# Patient Record
Sex: FEMALE | Race: WHITE | Employment: PART TIME | URBAN - METROPOLITAN AREA
[De-identification: names, ages, dates, MRNs, and addresses within clinical notes are randomized per-mention and may not be internally consistent; named-entity substitution may affect disease eponyms.]

---

## 2017-01-20 ENCOUNTER — HOSPITAL ENCOUNTER (OUTPATIENT)
Dept: WOMENS IMAGING | Age: 57
Discharge: HOME | End: 2017-01-20
Payer: COMMERCIAL

## 2017-01-20 DIAGNOSIS — Z12.31 ENCOUNTER FOR SCREENING MAMMOGRAM FOR BREAST CANCER: ICD-10-CM

## 2017-01-20 PROCEDURE — G0202 SCR MAMMO BI INCL CAD: HCPCS

## 2017-03-27 ENCOUNTER — APPOINTMENT (OUTPATIENT)
Dept: GENERAL RADIOLOGY | Age: 57
End: 2017-03-27
Payer: COMMERCIAL

## 2017-03-27 ENCOUNTER — HOSPITAL ENCOUNTER (EMERGENCY)
Age: 57
Discharge: HOME | End: 2017-03-27
Attending: EMERGENCY MEDICINE
Payer: COMMERCIAL

## 2017-03-27 VITALS
HEART RATE: 99 BPM | SYSTOLIC BLOOD PRESSURE: 127 MMHG | RESPIRATION RATE: 20 BRPM | TEMPERATURE: 98.9 F | OXYGEN SATURATION: 96 % | BODY MASS INDEX: 26.68 KG/M2 | DIASTOLIC BLOOD PRESSURE: 75 MMHG | WEIGHT: 170 LBS | HEIGHT: 67 IN

## 2017-03-27 DIAGNOSIS — J11.1 INFLUENZA WITH RESPIRATORY MANIFESTATION OTHER THAN PNEUMONIA: Primary | ICD-10-CM

## 2017-03-27 LAB
ALBUMIN SERPL-MCNC: 4.1 G/DL (ref 3.9–4.9)
ALP BLD-CCNC: 56 U/L (ref 40–130)
ALT SERPL-CCNC: 17 U/L (ref 0–33)
ANION GAP SERPL CALCULATED.3IONS-SCNC: 16 MEQ/L (ref 7–13)
AST SERPL-CCNC: 18 U/L (ref 0–35)
BASOPHILS ABSOLUTE: 0 K/UL (ref 0–0.2)
BASOPHILS RELATIVE PERCENT: 0.7 %
BILIRUB SERPL-MCNC: 0.2 MG/DL (ref 0–1.2)
BUN BLDV-MCNC: 8 MG/DL (ref 6–20)
CALCIUM SERPL-MCNC: 8.7 MG/DL (ref 8.6–10.2)
CHLORIDE BLD-SCNC: 100 MEQ/L (ref 98–107)
CO2: 21 MEQ/L (ref 22–29)
CREAT SERPL-MCNC: 0.88 MG/DL (ref 0.5–0.9)
EOSINOPHILS ABSOLUTE: 0.1 K/UL (ref 0–0.7)
EOSINOPHILS RELATIVE PERCENT: 2.9 %
GFR AFRICAN AMERICAN: >60
GFR NON-AFRICAN AMERICAN: >60
GLOBULIN: 2.3 G/DL (ref 2.3–3.5)
GLUCOSE BLD-MCNC: 103 MG/DL (ref 74–109)
HCT VFR BLD CALC: 38.5 % (ref 37–47)
HEMOGLOBIN: 13.2 G/DL (ref 12–16)
LYMPHOCYTES ABSOLUTE: 1.7 K/UL (ref 1–4.8)
LYMPHOCYTES RELATIVE PERCENT: 42.2 %
MCH RBC QN AUTO: 30.9 PG (ref 27–31.3)
MCHC RBC AUTO-ENTMCNC: 34.3 % (ref 33–37)
MCV RBC AUTO: 90 FL (ref 82–100)
MONOCYTES ABSOLUTE: 0.3 K/UL (ref 0.2–0.8)
MONOCYTES RELATIVE PERCENT: 6.9 %
NEUTROPHILS ABSOLUTE: 1.9 K/UL (ref 1.4–6.5)
NEUTROPHILS RELATIVE PERCENT: 47.3 %
PDW BLD-RTO: 12.9 % (ref 11.5–14.5)
PLATELET # BLD: 172 K/UL (ref 130–400)
POTASSIUM SERPL-SCNC: 4.1 MEQ/L (ref 3.5–5.1)
RAPID INFLUENZA  B AGN: POSITIVE
RAPID INFLUENZA A AGN: NEGATIVE
RBC # BLD: 4.28 M/UL (ref 4.2–5.4)
SODIUM BLD-SCNC: 137 MEQ/L (ref 132–144)
TOTAL PROTEIN: 6.4 G/DL (ref 6.4–8.1)
WBC # BLD: 4 K/UL (ref 4.8–10.8)

## 2017-03-27 PROCEDURE — 6370000000 HC RX 637 (ALT 250 FOR IP): Performed by: EMERGENCY MEDICINE

## 2017-03-27 PROCEDURE — 96374 THER/PROPH/DIAG INJ IV PUSH: CPT

## 2017-03-27 PROCEDURE — 6360000002 HC RX W HCPCS: Performed by: EMERGENCY MEDICINE

## 2017-03-27 PROCEDURE — 2580000003 HC RX 258: Performed by: EMERGENCY MEDICINE

## 2017-03-27 PROCEDURE — 94640 AIRWAY INHALATION TREATMENT: CPT

## 2017-03-27 PROCEDURE — 85025 COMPLETE CBC W/AUTO DIFF WBC: CPT

## 2017-03-27 PROCEDURE — 99284 EMERGENCY DEPT VISIT MOD MDM: CPT

## 2017-03-27 PROCEDURE — 80053 COMPREHEN METABOLIC PANEL: CPT

## 2017-03-27 PROCEDURE — 71020 XR CHEST STANDARD TWO VW: CPT

## 2017-03-27 PROCEDURE — 86403 PARTICLE AGGLUT ANTBDY SCRN: CPT

## 2017-03-27 PROCEDURE — 96375 TX/PRO/DX INJ NEW DRUG ADDON: CPT

## 2017-03-27 RX ORDER — ALBUTEROL SULFATE 90 UG/1
2 AEROSOL, METERED RESPIRATORY (INHALATION) EVERY 4 HOURS PRN
Qty: 1 INHALER | Refills: 0 | Status: SHIPPED | OUTPATIENT
Start: 2017-03-27 | End: 2018-04-05 | Stop reason: ALTCHOICE

## 2017-03-27 RX ORDER — DULOXETIN HYDROCHLORIDE 30 MG/1
30 CAPSULE, DELAYED RELEASE ORAL
Status: ON HOLD | COMMUNITY
Start: 2017-01-23 | End: 2021-07-29 | Stop reason: HOSPADM

## 2017-03-27 RX ORDER — GABAPENTIN 300 MG/1
300 CAPSULE ORAL 3 TIMES DAILY
Status: ON HOLD | COMMUNITY
Start: 2017-02-22 | End: 2021-09-28 | Stop reason: HOSPADM

## 2017-03-27 RX ORDER — LORAZEPAM 2 MG/1
2 TABLET ORAL
COMMUNITY
Start: 2017-03-23 | End: 2018-04-05 | Stop reason: SDUPTHER

## 2017-03-27 RX ORDER — KETOROLAC TROMETHAMINE 30 MG/ML
30 INJECTION, SOLUTION INTRAMUSCULAR; INTRAVENOUS ONCE
Status: COMPLETED | OUTPATIENT
Start: 2017-03-27 | End: 2017-03-27

## 2017-03-27 RX ORDER — SUMATRIPTAN 100 MG/1
100 TABLET, FILM COATED ORAL
COMMUNITY
Start: 2016-06-06 | End: 2018-09-19 | Stop reason: SDUPTHER

## 2017-03-27 RX ORDER — IPRATROPIUM BROMIDE AND ALBUTEROL SULFATE 2.5; .5 MG/3ML; MG/3ML
1 SOLUTION RESPIRATORY (INHALATION) ONCE
Status: COMPLETED | OUTPATIENT
Start: 2017-03-27 | End: 2017-03-27

## 2017-03-27 RX ORDER — 0.9 % SODIUM CHLORIDE 0.9 %
1000 INTRAVENOUS SOLUTION INTRAVENOUS ONCE
Status: COMPLETED | OUTPATIENT
Start: 2017-03-27 | End: 2017-03-27

## 2017-03-27 RX ORDER — ONDANSETRON 2 MG/ML
4 INJECTION INTRAMUSCULAR; INTRAVENOUS ONCE
Status: COMPLETED | OUTPATIENT
Start: 2017-03-27 | End: 2017-03-27

## 2017-03-27 RX ADMIN — SODIUM CHLORIDE 1000 ML: 9 INJECTION, SOLUTION INTRAVENOUS at 19:35

## 2017-03-27 RX ADMIN — ONDANSETRON HYDROCHLORIDE 4 MG: 2 SOLUTION INTRAMUSCULAR; INTRAVENOUS at 19:35

## 2017-03-27 RX ADMIN — KETOROLAC TROMETHAMINE 30 MG: 30 INJECTION, SOLUTION INTRAMUSCULAR at 19:36

## 2017-03-27 RX ADMIN — IPRATROPIUM BROMIDE AND ALBUTEROL SULFATE 1 AMPULE: .5; 3 SOLUTION RESPIRATORY (INHALATION) at 18:53

## 2017-03-27 ASSESSMENT — PAIN SCALES - GENERAL: PAINLEVEL_OUTOF10: 4

## 2017-03-27 ASSESSMENT — PAIN DESCRIPTION - DESCRIPTORS: DESCRIPTORS: ACHING

## 2017-03-27 ASSESSMENT — ENCOUNTER SYMPTOMS
FACIAL SWELLING: 0
SINUS PRESSURE: 1
EYE PAIN: 0
BACK PAIN: 0
BLOOD IN STOOL: 0
TROUBLE SWALLOWING: 0
VOICE CHANGE: 0
DIARRHEA: 0
CONSTIPATION: 0
ABDOMINAL PAIN: 0
SHORTNESS OF BREATH: 0
COUGH: 1
EYE DISCHARGE: 0
STRIDOR: 0
WHEEZING: 1
CHEST TIGHTNESS: 0
VOMITING: 0
RHINORRHEA: 1
SORE THROAT: 0
CHOKING: 0
EYE REDNESS: 0

## 2017-03-27 ASSESSMENT — PAIN DESCRIPTION - LOCATION: LOCATION: BACK;GENERALIZED

## 2017-03-27 ASSESSMENT — PAIN DESCRIPTION - ONSET: ONSET: PROGRESSIVE

## 2017-03-27 ASSESSMENT — PAIN DESCRIPTION - ORIENTATION: ORIENTATION: RIGHT;LEFT;LOWER;UPPER

## 2017-03-27 ASSESSMENT — PAIN DESCRIPTION - PROGRESSION: CLINICAL_PROGRESSION: NOT CHANGED

## 2017-04-03 ENCOUNTER — OFFICE VISIT (OUTPATIENT)
Dept: FAMILY MEDICINE CLINIC | Age: 57
End: 2017-04-03

## 2017-04-03 VITALS
BODY MASS INDEX: 26.53 KG/M2 | SYSTOLIC BLOOD PRESSURE: 122 MMHG | RESPIRATION RATE: 20 BRPM | HEIGHT: 67 IN | TEMPERATURE: 97.6 F | WEIGHT: 169 LBS | OXYGEN SATURATION: 98 % | DIASTOLIC BLOOD PRESSURE: 82 MMHG | HEART RATE: 114 BPM

## 2017-04-03 DIAGNOSIS — J06.9 UPPER RESPIRATORY TRACT INFECTION, UNSPECIFIED TYPE: Primary | ICD-10-CM

## 2017-04-03 DIAGNOSIS — M79.10 MYALGIA: ICD-10-CM

## 2017-04-03 PROCEDURE — 99213 OFFICE O/P EST LOW 20 MIN: CPT | Performed by: NURSE PRACTITIONER

## 2017-04-03 PROCEDURE — 87804 INFLUENZA ASSAY W/OPTIC: CPT | Performed by: NURSE PRACTITIONER

## 2017-04-03 RX ORDER — AZITHROMYCIN 250 MG/1
TABLET, FILM COATED ORAL
Qty: 1 PACKET | Refills: 0 | Status: SHIPPED | OUTPATIENT
Start: 2017-04-03 | End: 2018-04-05 | Stop reason: ALTCHOICE

## 2017-04-07 ENCOUNTER — TELEPHONE (OUTPATIENT)
Dept: SURGERY | Age: 57
End: 2017-04-07

## 2017-04-17 DIAGNOSIS — R93.89 ABNORMAL CT OF THE CHEST: Primary | ICD-10-CM

## 2017-04-20 ASSESSMENT — ENCOUNTER SYMPTOMS
VOMITING: 0
COUGH: 1
DIARRHEA: 0
RHINORRHEA: 1
ANAL BLEEDING: 0
SHORTNESS OF BREATH: 0
SINUS PAIN: 1
CONSTIPATION: 0
BLOOD IN STOOL: 0
NAUSEA: 0
CHEST TIGHTNESS: 0
ABDOMINAL PAIN: 0
SORE THROAT: 1
WHEEZING: 0
ABDOMINAL DISTENTION: 0
SWOLLEN GLANDS: 1

## 2017-04-28 ENCOUNTER — HOSPITAL ENCOUNTER (OUTPATIENT)
Dept: ULTRASOUND IMAGING | Age: 57
Discharge: HOME | End: 2017-04-28
Payer: COMMERCIAL

## 2017-04-28 ENCOUNTER — HOSPITAL ENCOUNTER (OUTPATIENT)
Dept: WOMENS IMAGING | Age: 57
Discharge: HOME | End: 2017-04-28
Payer: COMMERCIAL

## 2017-04-28 DIAGNOSIS — R93.89 ABNORMAL CT OF THE CHEST: ICD-10-CM

## 2017-04-28 PROCEDURE — 76642 ULTRASOUND BREAST LIMITED: CPT

## 2017-04-28 PROCEDURE — G0206 DX MAMMO INCL CAD UNI: HCPCS

## 2017-05-02 ENCOUNTER — OFFICE VISIT (OUTPATIENT)
Dept: SURGERY | Age: 57
End: 2017-05-02

## 2017-05-02 VITALS
SYSTOLIC BLOOD PRESSURE: 122 MMHG | HEIGHT: 67 IN | DIASTOLIC BLOOD PRESSURE: 84 MMHG | BODY MASS INDEX: 26.15 KG/M2 | WEIGHT: 166.6 LBS | TEMPERATURE: 97.6 F

## 2017-05-02 DIAGNOSIS — R92.8 ABNORMAL ULTRASOUND OF BREAST: ICD-10-CM

## 2017-05-02 DIAGNOSIS — R92.8 ABNORMAL MAMMOGRAM: Primary | ICD-10-CM

## 2017-05-02 PROCEDURE — 99213 OFFICE O/P EST LOW 20 MIN: CPT | Performed by: SURGERY

## 2017-05-02 PROCEDURE — G8419 CALC BMI OUT NRM PARAM NOF/U: HCPCS | Performed by: SURGERY

## 2017-05-02 PROCEDURE — G8427 DOCREV CUR MEDS BY ELIG CLIN: HCPCS | Performed by: SURGERY

## 2017-05-02 PROCEDURE — 3017F COLORECTAL CA SCREEN DOC REV: CPT | Performed by: SURGERY

## 2017-05-02 PROCEDURE — 4004F PT TOBACCO SCREEN RCVD TLK: CPT | Performed by: SURGERY

## 2017-05-02 PROCEDURE — 3014F SCREEN MAMMO DOC REV: CPT | Performed by: SURGERY

## 2017-05-03 PROBLEM — R92.8 ABNORMAL ULTRASOUND OF BREAST: Status: ACTIVE | Noted: 2017-05-03

## 2017-05-10 ENCOUNTER — HOSPITAL ENCOUNTER (OUTPATIENT)
Dept: WOMENS IMAGING | Age: 57
Discharge: HOME | End: 2017-05-10
Payer: COMMERCIAL

## 2017-05-10 ENCOUNTER — HOSPITAL ENCOUNTER (OUTPATIENT)
Dept: ULTRASOUND IMAGING | Age: 57
Discharge: HOME | End: 2017-05-10
Payer: COMMERCIAL

## 2017-05-10 ENCOUNTER — PREP FOR PROCEDURE (OUTPATIENT)
Dept: WOMENS IMAGING | Age: 57
End: 2017-05-10

## 2017-05-10 VITALS
OXYGEN SATURATION: 97 % | DIASTOLIC BLOOD PRESSURE: 81 MMHG | HEART RATE: 81 BPM | WEIGHT: 165 LBS | HEIGHT: 67 IN | RESPIRATION RATE: 18 BRPM | SYSTOLIC BLOOD PRESSURE: 115 MMHG | BODY MASS INDEX: 25.9 KG/M2 | TEMPERATURE: 96.9 F

## 2017-05-10 DIAGNOSIS — R92.8 ABNORMAL ULTRASOUND OF BREAST: ICD-10-CM

## 2017-05-10 DIAGNOSIS — R92.8 ABNORMAL MAMMOGRAM: ICD-10-CM

## 2017-05-10 PROCEDURE — 19083 BX BREAST 1ST LESION US IMAG: CPT | Performed by: SURGERY

## 2017-05-10 PROCEDURE — 2580000003 HC RX 258: Performed by: SURGERY

## 2017-05-10 PROCEDURE — 19083 BX BREAST 1ST LESION US IMAG: CPT

## 2017-05-10 PROCEDURE — 88305 TISSUE EXAM BY PATHOLOGIST: CPT

## 2017-05-10 PROCEDURE — 2500000003 HC RX 250 WO HCPCS: Performed by: SURGERY

## 2017-05-10 PROCEDURE — G0206 DX MAMMO INCL CAD UNI: HCPCS

## 2017-05-10 RX ORDER — LIDOCAINE HYDROCHLORIDE 20 MG/ML
20 INJECTION, SOLUTION INFILTRATION; PERINEURAL ONCE
Status: CANCELLED | OUTPATIENT
Start: 2017-05-10 | End: 2017-05-10

## 2017-05-10 RX ORDER — SODIUM CHLORIDE 9 MG/ML
INJECTION, SOLUTION INTRAVENOUS CONTINUOUS
Status: CANCELLED | OUTPATIENT
Start: 2017-05-10

## 2017-05-10 RX ORDER — SODIUM CHLORIDE 9 MG/ML
INJECTION, SOLUTION INTRAVENOUS CONTINUOUS
Status: DISCONTINUED | OUTPATIENT
Start: 2017-05-10 | End: 2017-05-13 | Stop reason: HOSPADM

## 2017-05-10 RX ORDER — LIDOCAINE HYDROCHLORIDE 20 MG/ML
20 INJECTION, SOLUTION INFILTRATION; PERINEURAL ONCE
Status: COMPLETED | OUTPATIENT
Start: 2017-05-10 | End: 2017-05-10

## 2017-05-10 RX ADMIN — SODIUM CHLORIDE: 900 INJECTION, SOLUTION INTRAVENOUS at 12:00

## 2017-05-10 RX ADMIN — LIDOCAINE HYDROCHLORIDE 8 ML: 20 INJECTION, SOLUTION INFILTRATION; PERINEURAL at 12:18

## 2017-05-17 ENCOUNTER — OFFICE VISIT (OUTPATIENT)
Dept: SURGERY | Age: 57
End: 2017-05-17

## 2017-05-17 VITALS
DIASTOLIC BLOOD PRESSURE: 72 MMHG | BODY MASS INDEX: 26.21 KG/M2 | TEMPERATURE: 97.5 F | WEIGHT: 167 LBS | SYSTOLIC BLOOD PRESSURE: 114 MMHG | HEIGHT: 67 IN

## 2017-05-17 DIAGNOSIS — D05.12 DCIS (DUCTAL CARCINOMA IN SITU), LEFT: Primary | ICD-10-CM

## 2017-05-17 PROCEDURE — 3017F COLORECTAL CA SCREEN DOC REV: CPT | Performed by: SURGERY

## 2017-05-17 PROCEDURE — 4004F PT TOBACCO SCREEN RCVD TLK: CPT | Performed by: SURGERY

## 2017-05-17 PROCEDURE — 3014F SCREEN MAMMO DOC REV: CPT | Performed by: SURGERY

## 2017-05-17 PROCEDURE — G8427 DOCREV CUR MEDS BY ELIG CLIN: HCPCS | Performed by: SURGERY

## 2017-05-17 PROCEDURE — G8419 CALC BMI OUT NRM PARAM NOF/U: HCPCS | Performed by: SURGERY

## 2017-05-17 PROCEDURE — 99213 OFFICE O/P EST LOW 20 MIN: CPT | Performed by: SURGERY

## 2017-05-22 RX ORDER — SODIUM CHLORIDE, SODIUM LACTATE, POTASSIUM CHLORIDE, CALCIUM CHLORIDE 600; 310; 30; 20 MG/100ML; MG/100ML; MG/100ML; MG/100ML
INJECTION, SOLUTION INTRAVENOUS CONTINUOUS
Status: CANCELLED | OUTPATIENT
Start: 2017-05-22

## 2017-05-23 ENCOUNTER — ANESTHESIA (OUTPATIENT)
Dept: OPERATING ROOM | Age: 57
End: 2017-05-23
Payer: COMMERCIAL

## 2017-05-23 ENCOUNTER — APPOINTMENT (OUTPATIENT)
Dept: WOMENS IMAGING | Age: 57
End: 2017-05-23
Attending: SURGERY
Payer: COMMERCIAL

## 2017-05-23 ENCOUNTER — ANESTHESIA EVENT (OUTPATIENT)
Dept: OPERATING ROOM | Age: 57
End: 2017-05-23
Payer: COMMERCIAL

## 2017-05-23 ENCOUNTER — HOSPITAL ENCOUNTER (OUTPATIENT)
Age: 57
Setting detail: OUTPATIENT SURGERY
Discharge: HOME | End: 2017-05-23
Attending: SURGERY | Admitting: SURGERY
Payer: COMMERCIAL

## 2017-05-23 ENCOUNTER — APPOINTMENT (OUTPATIENT)
Dept: ULTRASOUND IMAGING | Age: 57
End: 2017-05-23
Attending: SURGERY
Payer: COMMERCIAL

## 2017-05-23 VITALS
HEART RATE: 80 BPM | TEMPERATURE: 97.3 F | HEIGHT: 67 IN | WEIGHT: 165 LBS | RESPIRATION RATE: 16 BRPM | SYSTOLIC BLOOD PRESSURE: 119 MMHG | OXYGEN SATURATION: 97 % | DIASTOLIC BLOOD PRESSURE: 72 MMHG | BODY MASS INDEX: 25.9 KG/M2

## 2017-05-23 VITALS
TEMPERATURE: 95.4 F | SYSTOLIC BLOOD PRESSURE: 127 MMHG | RESPIRATION RATE: 15 BRPM | DIASTOLIC BLOOD PRESSURE: 78 MMHG | OXYGEN SATURATION: 98 %

## 2017-05-23 DIAGNOSIS — R92.8 ABNORMAL MAMMOGRAM: ICD-10-CM

## 2017-05-23 LAB
ANION GAP SERPL CALCULATED.3IONS-SCNC: 12 MEQ/L (ref 7–13)
BUN BLDV-MCNC: 13 MG/DL (ref 6–20)
CALCIUM SERPL-MCNC: 8.9 MG/DL (ref 8.6–10.2)
CHLORIDE BLD-SCNC: 106 MEQ/L (ref 98–107)
CO2: 22 MEQ/L (ref 22–29)
CREAT SERPL-MCNC: 0.73 MG/DL (ref 0.5–0.9)
GFR AFRICAN AMERICAN: >60
GFR NON-AFRICAN AMERICAN: >60
GLUCOSE BLD-MCNC: 102 MG/DL (ref 74–109)
HCT VFR BLD CALC: 41.4 % (ref 37–47)
HEMOGLOBIN: 13.6 G/DL (ref 12–16)
MCH RBC QN AUTO: 29.6 PG (ref 27–31.3)
MCHC RBC AUTO-ENTMCNC: 32.9 % (ref 33–37)
MCV RBC AUTO: 89.8 FL (ref 82–100)
PDW BLD-RTO: 13.3 % (ref 11.5–14.5)
PLATELET # BLD: 200 K/UL (ref 130–400)
POTASSIUM SERPL-SCNC: 4.4 MEQ/L (ref 3.5–5.1)
RBC # BLD: 4.61 M/UL (ref 4.2–5.4)
SODIUM BLD-SCNC: 140 MEQ/L (ref 132–144)
WBC # BLD: 6.8 K/UL (ref 4.8–10.8)

## 2017-05-23 PROCEDURE — 76942 ECHO GUIDE FOR BIOPSY: CPT

## 2017-05-23 PROCEDURE — 2580000003 HC RX 258: Performed by: SURGERY

## 2017-05-23 PROCEDURE — 38900 IO MAP OF SENT LYMPH NODE: CPT | Performed by: SURGERY

## 2017-05-23 PROCEDURE — 3700000000 HC ANESTHESIA ATTENDED CARE: Performed by: SURGERY

## 2017-05-23 PROCEDURE — 88331 PATH CONSLTJ SURG 1 BLK 1SPC: CPT

## 2017-05-23 PROCEDURE — C1713 ANCHOR/SCREW BN/BN,TIS/BN: HCPCS | Performed by: SURGERY

## 2017-05-23 PROCEDURE — 19301 PARTIAL MASTECTOMY: CPT | Performed by: SURGERY

## 2017-05-23 PROCEDURE — 88307 TISSUE EXAM BY PATHOLOGIST: CPT

## 2017-05-23 PROCEDURE — 80048 BASIC METABOLIC PNL TOTAL CA: CPT

## 2017-05-23 PROCEDURE — 88305 TISSUE EXAM BY PATHOLOGIST: CPT

## 2017-05-23 PROCEDURE — 6370000000 HC RX 637 (ALT 250 FOR IP): Performed by: STUDENT IN AN ORGANIZED HEALTH CARE EDUCATION/TRAINING PROGRAM

## 2017-05-23 PROCEDURE — 85027 COMPLETE CBC AUTOMATED: CPT

## 2017-05-23 PROCEDURE — 3600000013 HC SURGERY LEVEL 3 ADDTL 15MIN: Performed by: SURGERY

## 2017-05-23 PROCEDURE — 7100000000 HC PACU RECOVERY - FIRST 15 MIN: Performed by: SURGERY

## 2017-05-23 PROCEDURE — 6360000002 HC RX W HCPCS: Performed by: NURSE ANESTHETIST, CERTIFIED REGISTERED

## 2017-05-23 PROCEDURE — 88341 IMHCHEM/IMCYTCHM EA ADD ANTB: CPT

## 2017-05-23 PROCEDURE — 76098 X-RAY EXAM SURGICAL SPECIMEN: CPT

## 2017-05-23 PROCEDURE — 3700000001 HC ADD 15 MINUTES (ANESTHESIA): Performed by: SURGERY

## 2017-05-23 PROCEDURE — 7100000010 HC PHASE II RECOVERY - FIRST 15 MIN: Performed by: SURGERY

## 2017-05-23 PROCEDURE — 6360000002 HC RX W HCPCS: Performed by: SURGERY

## 2017-05-23 PROCEDURE — 7100000001 HC PACU RECOVERY - ADDTL 15 MIN: Performed by: SURGERY

## 2017-05-23 PROCEDURE — 3600000003 HC SURGERY LEVEL 3 BASE: Performed by: SURGERY

## 2017-05-23 PROCEDURE — 38500 BIOPSY/REMOVAL LYMPH NODES: CPT | Performed by: SURGERY

## 2017-05-23 PROCEDURE — 88313 SPECIAL STAINS GROUP 2: CPT

## 2017-05-23 PROCEDURE — 7100000011 HC PHASE II RECOVERY - ADDTL 15 MIN: Performed by: SURGERY

## 2017-05-23 PROCEDURE — 6360000002 HC RX W HCPCS: Performed by: STUDENT IN AN ORGANIZED HEALTH CARE EDUCATION/TRAINING PROGRAM

## 2017-05-23 PROCEDURE — 2500000003 HC RX 250 WO HCPCS: Performed by: STUDENT IN AN ORGANIZED HEALTH CARE EDUCATION/TRAINING PROGRAM

## 2017-05-23 PROCEDURE — 88342 IMHCHEM/IMCYTCHM 1ST ANTB: CPT

## 2017-05-23 RX ORDER — SODIUM CHLORIDE 9 MG/ML
INJECTION, SOLUTION INTRAVENOUS CONTINUOUS
Status: DISCONTINUED | OUTPATIENT
Start: 2017-05-23 | End: 2017-05-23 | Stop reason: HOSPADM

## 2017-05-23 RX ORDER — MEPERIDINE HYDROCHLORIDE 25 MG/ML
12.5 INJECTION INTRAMUSCULAR; INTRAVENOUS; SUBCUTANEOUS EVERY 5 MIN PRN
Status: DISCONTINUED | OUTPATIENT
Start: 2017-05-23 | End: 2017-05-23 | Stop reason: HOSPADM

## 2017-05-23 RX ORDER — MAGNESIUM HYDROXIDE 1200 MG/15ML
LIQUID ORAL CONTINUOUS PRN
Status: DISCONTINUED | OUTPATIENT
Start: 2017-05-23 | End: 2017-05-23 | Stop reason: HOSPADM

## 2017-05-23 RX ORDER — HYDROCODONE BITARTRATE AND ACETAMINOPHEN 5; 325 MG/1; MG/1
2 TABLET ORAL PRN
Status: COMPLETED | OUTPATIENT
Start: 2017-05-23 | End: 2017-05-23

## 2017-05-23 RX ORDER — HYDROCODONE BITARTRATE AND ACETAMINOPHEN 5; 325 MG/1; MG/1
1 TABLET ORAL PRN
Status: COMPLETED | OUTPATIENT
Start: 2017-05-23 | End: 2017-05-23

## 2017-05-23 RX ORDER — METOCLOPRAMIDE HYDROCHLORIDE 5 MG/ML
10 INJECTION INTRAMUSCULAR; INTRAVENOUS
Status: DISCONTINUED | OUTPATIENT
Start: 2017-05-23 | End: 2017-05-23 | Stop reason: HOSPADM

## 2017-05-23 RX ORDER — SODIUM CHLORIDE 0.9 % (FLUSH) 0.9 %
10 SYRINGE (ML) INJECTION EVERY 12 HOURS SCHEDULED
Status: DISCONTINUED | OUTPATIENT
Start: 2017-05-23 | End: 2017-05-23 | Stop reason: HOSPADM

## 2017-05-23 RX ORDER — ACETAMINOPHEN 325 MG/1
650 TABLET ORAL EVERY 4 HOURS PRN
Status: DISCONTINUED | OUTPATIENT
Start: 2017-05-23 | End: 2017-05-23 | Stop reason: HOSPADM

## 2017-05-23 RX ORDER — OXYCODONE HYDROCHLORIDE AND ACETAMINOPHEN 5; 325 MG/1; MG/1
1 TABLET ORAL EVERY 6 HOURS PRN
Qty: 30 TABLET | Refills: 0 | Status: SHIPPED | OUTPATIENT
Start: 2017-05-23 | End: 2018-04-05 | Stop reason: ALTCHOICE

## 2017-05-23 RX ORDER — ACETAMINOPHEN 650 MG/1
650 SUPPOSITORY RECTAL EVERY 4 HOURS PRN
Status: DISCONTINUED | OUTPATIENT
Start: 2017-05-23 | End: 2017-05-23 | Stop reason: HOSPADM

## 2017-05-23 RX ORDER — PROPOFOL 10 MG/ML
INJECTION, EMULSION INTRAVENOUS PRN
Status: DISCONTINUED | OUTPATIENT
Start: 2017-05-23 | End: 2017-05-23 | Stop reason: SDUPTHER

## 2017-05-23 RX ORDER — FENTANYL CITRATE 50 UG/ML
50 INJECTION, SOLUTION INTRAMUSCULAR; INTRAVENOUS EVERY 10 MIN PRN
Status: DISCONTINUED | OUTPATIENT
Start: 2017-05-23 | End: 2017-05-23 | Stop reason: HOSPADM

## 2017-05-23 RX ORDER — DIPHENHYDRAMINE HYDROCHLORIDE 50 MG/ML
12.5 INJECTION INTRAMUSCULAR; INTRAVENOUS
Status: DISCONTINUED | OUTPATIENT
Start: 2017-05-23 | End: 2017-05-23 | Stop reason: HOSPADM

## 2017-05-23 RX ORDER — ONDANSETRON 2 MG/ML
4 INJECTION INTRAMUSCULAR; INTRAVENOUS
Status: DISCONTINUED | OUTPATIENT
Start: 2017-05-23 | End: 2017-05-23 | Stop reason: HOSPADM

## 2017-05-23 RX ORDER — OXYCODONE HYDROCHLORIDE AND ACETAMINOPHEN 5; 325 MG/1; MG/1
2 TABLET ORAL EVERY 4 HOURS PRN
Status: DISCONTINUED | OUTPATIENT
Start: 2017-05-23 | End: 2017-05-23 | Stop reason: HOSPADM

## 2017-05-23 RX ORDER — ONDANSETRON 2 MG/ML
INJECTION INTRAMUSCULAR; INTRAVENOUS PRN
Status: DISCONTINUED | OUTPATIENT
Start: 2017-05-23 | End: 2017-05-23 | Stop reason: SDUPTHER

## 2017-05-23 RX ORDER — ONDANSETRON 2 MG/ML
4 INJECTION INTRAMUSCULAR; INTRAVENOUS EVERY 6 HOURS PRN
Status: DISCONTINUED | OUTPATIENT
Start: 2017-05-23 | End: 2017-05-23 | Stop reason: HOSPADM

## 2017-05-23 RX ORDER — MORPHINE SULFATE 4 MG/ML
4 INJECTION, SOLUTION INTRAMUSCULAR; INTRAVENOUS
Status: DISCONTINUED | OUTPATIENT
Start: 2017-05-23 | End: 2017-05-23 | Stop reason: HOSPADM

## 2017-05-23 RX ORDER — DEXAMETHASONE SODIUM PHOSPHATE 4 MG/ML
INJECTION, SOLUTION INTRA-ARTICULAR; INTRALESIONAL; INTRAMUSCULAR; INTRAVENOUS; SOFT TISSUE PRN
Status: DISCONTINUED | OUTPATIENT
Start: 2017-05-23 | End: 2017-05-23 | Stop reason: SDUPTHER

## 2017-05-23 RX ORDER — MORPHINE SULFATE 2 MG/ML
2 INJECTION, SOLUTION INTRAMUSCULAR; INTRAVENOUS
Status: DISCONTINUED | OUTPATIENT
Start: 2017-05-23 | End: 2017-05-23 | Stop reason: HOSPADM

## 2017-05-23 RX ORDER — FENTANYL CITRATE 50 UG/ML
INJECTION, SOLUTION INTRAMUSCULAR; INTRAVENOUS PRN
Status: DISCONTINUED | OUTPATIENT
Start: 2017-05-23 | End: 2017-05-23 | Stop reason: SDUPTHER

## 2017-05-23 RX ORDER — SODIUM CHLORIDE 9 MG/ML
INJECTION, SOLUTION INTRAVENOUS
Status: DISCONTINUED
Start: 2017-05-23 | End: 2017-05-23 | Stop reason: HOSPADM

## 2017-05-23 RX ORDER — OXYCODONE HYDROCHLORIDE AND ACETAMINOPHEN 5; 325 MG/1; MG/1
1 TABLET ORAL EVERY 4 HOURS PRN
Status: DISCONTINUED | OUTPATIENT
Start: 2017-05-23 | End: 2017-05-23 | Stop reason: HOSPADM

## 2017-05-23 RX ORDER — MIDAZOLAM HYDROCHLORIDE 1 MG/ML
INJECTION INTRAMUSCULAR; INTRAVENOUS PRN
Status: DISCONTINUED | OUTPATIENT
Start: 2017-05-23 | End: 2017-05-23 | Stop reason: SDUPTHER

## 2017-05-23 RX ORDER — LIDOCAINE HYDROCHLORIDE 10 MG/ML
1 INJECTION, SOLUTION EPIDURAL; INFILTRATION; INTRACAUDAL; PERINEURAL
Status: COMPLETED | OUTPATIENT
Start: 2017-05-23 | End: 2017-05-23

## 2017-05-23 RX ORDER — SODIUM CHLORIDE 0.9 % (FLUSH) 0.9 %
10 SYRINGE (ML) INJECTION PRN
Status: DISCONTINUED | OUTPATIENT
Start: 2017-05-23 | End: 2017-05-23 | Stop reason: HOSPADM

## 2017-05-23 RX ADMIN — LIDOCAINE HYDROCHLORIDE 100 MG: 20 INJECTION, SOLUTION INTRAVENOUS at 08:43

## 2017-05-23 RX ADMIN — PROPOFOL 200 MG: 10 INJECTION, EMULSION INTRAVENOUS at 08:43

## 2017-05-23 RX ADMIN — FENTANYL CITRATE 75 MCG: 50 INJECTION, SOLUTION INTRAMUSCULAR; INTRAVENOUS at 10:16

## 2017-05-23 RX ADMIN — CEFTRIAXONE SODIUM 1 G: 1 INJECTION, POWDER, FOR SOLUTION INTRAMUSCULAR; INTRAVENOUS at 08:38

## 2017-05-23 RX ADMIN — DEXAMETHASONE SODIUM PHOSPHATE 8 MG: 4 INJECTION, SOLUTION INTRAMUSCULAR; INTRAVENOUS at 08:58

## 2017-05-23 RX ADMIN — LIDOCAINE HYDROCHLORIDE 0.1 ML: 10 INJECTION, SOLUTION EPIDURAL; INFILTRATION; INTRACAUDAL; PERINEURAL at 07:38

## 2017-05-23 RX ADMIN — SODIUM CHLORIDE: 9 INJECTION, SOLUTION INTRAVENOUS at 07:40

## 2017-05-23 RX ADMIN — FENTANYL CITRATE 50 MCG: 50 INJECTION, SOLUTION INTRAMUSCULAR; INTRAVENOUS at 10:29

## 2017-05-23 RX ADMIN — FENTANYL CITRATE 100 MCG: 50 INJECTION, SOLUTION INTRAMUSCULAR; INTRAVENOUS at 08:43

## 2017-05-23 RX ADMIN — HYDROCODONE BITARTRATE AND ACETAMINOPHEN 2 TABLET: 5; 325 TABLET ORAL at 10:51

## 2017-05-23 RX ADMIN — ONDANSETRON HYDROCHLORIDE 4 MG: 2 INJECTION, SOLUTION INTRAMUSCULAR; INTRAVENOUS at 09:54

## 2017-05-23 RX ADMIN — MIDAZOLAM HYDROCHLORIDE 2 MG: 1 INJECTION, SOLUTION INTRAMUSCULAR; INTRAVENOUS at 08:38

## 2017-05-23 RX ADMIN — FENTANYL CITRATE 25 MCG: 50 INJECTION, SOLUTION INTRAMUSCULAR; INTRAVENOUS at 09:58

## 2017-05-23 ASSESSMENT — PAIN DESCRIPTION - LOCATION: LOCATION: BREAST

## 2017-05-23 ASSESSMENT — PAIN SCALES - GENERAL
PAINLEVEL_OUTOF10: 5
PAINLEVEL_OUTOF10: 2
PAINLEVEL_OUTOF10: 4

## 2017-05-23 ASSESSMENT — PAIN - FUNCTIONAL ASSESSMENT: PAIN_FUNCTIONAL_ASSESSMENT: 0-10

## 2017-05-23 ASSESSMENT — PAIN DESCRIPTION - PAIN TYPE: TYPE: SURGICAL PAIN

## 2017-05-24 ENCOUNTER — TELEPHONE (OUTPATIENT)
Dept: SURGERY | Age: 57
End: 2017-05-24

## 2017-05-26 LAB
EKG ATRIAL RATE: 60 BPM
EKG P AXIS: 72 DEGREES
EKG P-R INTERVAL: 154 MS
EKG Q-T INTERVAL: 416 MS
EKG QRS DURATION: 80 MS
EKG QTC CALCULATION (BAZETT): 416 MS
EKG R AXIS: 16 DEGREES
EKG T AXIS: 35 DEGREES
EKG VENTRICULAR RATE: 60 BPM

## 2017-06-02 ENCOUNTER — OFFICE VISIT (OUTPATIENT)
Dept: SURGERY | Age: 57
End: 2017-06-02

## 2017-06-02 VITALS
BODY MASS INDEX: 26.53 KG/M2 | SYSTOLIC BLOOD PRESSURE: 124 MMHG | DIASTOLIC BLOOD PRESSURE: 72 MMHG | HEIGHT: 67 IN | TEMPERATURE: 97.6 F | WEIGHT: 169 LBS

## 2017-06-02 DIAGNOSIS — D05.12 DCIS (DUCTAL CARCINOMA IN SITU), LEFT: Primary | ICD-10-CM

## 2017-06-02 DIAGNOSIS — Z09 SURGERY FOLLOW-UP: ICD-10-CM

## 2017-06-02 DIAGNOSIS — C50.512 MALIGNANT NEOPLASM OF LOWER-OUTER QUADRANT OF LEFT FEMALE BREAST (HCC): ICD-10-CM

## 2017-06-02 PROCEDURE — 99024 POSTOP FOLLOW-UP VISIT: CPT | Performed by: SURGERY

## 2017-06-06 ENCOUNTER — TELEPHONE (OUTPATIENT)
Dept: SURGERY | Age: 57
End: 2017-06-06

## 2017-07-30 ENCOUNTER — HOSPITAL ENCOUNTER (EMERGENCY)
Age: 57
Discharge: HOME | End: 2017-07-30
Attending: EMERGENCY MEDICINE
Payer: COMMERCIAL

## 2017-07-30 VITALS
HEIGHT: 67 IN | SYSTOLIC BLOOD PRESSURE: 158 MMHG | WEIGHT: 165 LBS | DIASTOLIC BLOOD PRESSURE: 85 MMHG | RESPIRATION RATE: 14 BRPM | OXYGEN SATURATION: 97 % | HEART RATE: 74 BPM | TEMPERATURE: 97.7 F | BODY MASS INDEX: 25.9 KG/M2

## 2017-07-30 DIAGNOSIS — K04.7 DENTAL ABSCESS: Primary | ICD-10-CM

## 2017-07-30 PROCEDURE — 99282 EMERGENCY DEPT VISIT SF MDM: CPT

## 2017-07-30 RX ORDER — PENICILLIN V POTASSIUM 500 MG/1
500 TABLET ORAL 4 TIMES DAILY
Qty: 40 TABLET | Refills: 0 | Status: SHIPPED | OUTPATIENT
Start: 2017-07-30 | End: 2017-08-09

## 2017-07-30 RX ORDER — HYDROCODONE BITARTRATE AND ACETAMINOPHEN 5; 325 MG/1; MG/1
1 TABLET ORAL EVERY 6 HOURS PRN
Qty: 8 TABLET | Refills: 0 | Status: SHIPPED | OUTPATIENT
Start: 2017-07-30 | End: 2017-08-06

## 2017-07-30 RX ORDER — NAPROXEN 500 MG/1
500 TABLET ORAL 2 TIMES DAILY WITH MEALS
Qty: 10 TABLET | Refills: 0 | Status: SHIPPED | OUTPATIENT
Start: 2017-07-30 | End: 2018-04-05 | Stop reason: ALTCHOICE

## 2017-07-30 ASSESSMENT — ENCOUNTER SYMPTOMS
ABDOMINAL PAIN: 0
TROUBLE SWALLOWING: 0
SHORTNESS OF BREATH: 0
BACK PAIN: 0
VOMITING: 0
BLOOD IN STOOL: 0
WHEEZING: 0
EYE PAIN: 0
COUGH: 0
NAUSEA: 0
RHINORRHEA: 0
DIARRHEA: 0
CHEST TIGHTNESS: 0

## 2017-07-30 ASSESSMENT — PAIN DESCRIPTION - ONSET: ONSET: GRADUAL

## 2017-07-30 ASSESSMENT — PAIN SCALES - GENERAL: PAINLEVEL_OUTOF10: 10

## 2017-07-30 ASSESSMENT — PAIN DESCRIPTION - ORIENTATION: ORIENTATION: LEFT

## 2017-07-30 ASSESSMENT — PAIN DESCRIPTION - LOCATION: LOCATION: MOUTH;TEETH;JAW

## 2017-07-30 ASSESSMENT — PAIN DESCRIPTION - FREQUENCY: FREQUENCY: CONTINUOUS

## 2018-04-05 ENCOUNTER — OFFICE VISIT (OUTPATIENT)
Dept: FAMILY MEDICINE CLINIC | Age: 58
End: 2018-04-05
Payer: COMMERCIAL

## 2018-04-05 VITALS
RESPIRATION RATE: 12 BRPM | TEMPERATURE: 98.3 F | WEIGHT: 190 LBS | DIASTOLIC BLOOD PRESSURE: 82 MMHG | HEIGHT: 67 IN | OXYGEN SATURATION: 98 % | HEART RATE: 85 BPM | SYSTOLIC BLOOD PRESSURE: 130 MMHG | BODY MASS INDEX: 29.82 KG/M2

## 2018-04-05 DIAGNOSIS — G89.29 CHRONIC BILATERAL LOW BACK PAIN WITHOUT SCIATICA: ICD-10-CM

## 2018-04-05 DIAGNOSIS — R53.82 CHRONIC FATIGUE: Primary | ICD-10-CM

## 2018-04-05 DIAGNOSIS — Z23 NEED FOR PROPHYLACTIC VACCINATION AGAINST STREPTOCOCCUS PNEUMONIAE (PNEUMOCOCCUS): ICD-10-CM

## 2018-04-05 DIAGNOSIS — F17.200 TOBACCO USE DISORDER: ICD-10-CM

## 2018-04-05 DIAGNOSIS — Z79.899 HIGH RISK MEDICATION USE: ICD-10-CM

## 2018-04-05 DIAGNOSIS — M54.50 CHRONIC BILATERAL LOW BACK PAIN WITHOUT SCIATICA: ICD-10-CM

## 2018-04-05 PROBLEM — R92.8 ABNORMAL ULTRASOUND OF BREAST: Status: RESOLVED | Noted: 2017-05-03 | Resolved: 2018-04-05

## 2018-04-05 PROCEDURE — 4004F PT TOBACCO SCREEN RCVD TLK: CPT | Performed by: FAMILY MEDICINE

## 2018-04-05 PROCEDURE — 3014F SCREEN MAMMO DOC REV: CPT | Performed by: FAMILY MEDICINE

## 2018-04-05 PROCEDURE — G8427 DOCREV CUR MEDS BY ELIG CLIN: HCPCS | Performed by: FAMILY MEDICINE

## 2018-04-05 PROCEDURE — G8419 CALC BMI OUT NRM PARAM NOF/U: HCPCS | Performed by: FAMILY MEDICINE

## 2018-04-05 PROCEDURE — 90732 PPSV23 VACC 2 YRS+ SUBQ/IM: CPT | Performed by: FAMILY MEDICINE

## 2018-04-05 PROCEDURE — 90471 IMMUNIZATION ADMIN: CPT | Performed by: FAMILY MEDICINE

## 2018-04-05 PROCEDURE — 99203 OFFICE O/P NEW LOW 30 MIN: CPT | Performed by: FAMILY MEDICINE

## 2018-04-05 PROCEDURE — 3017F COLORECTAL CA SCREEN DOC REV: CPT | Performed by: FAMILY MEDICINE

## 2018-04-05 RX ORDER — HYDROCODONE BITARTRATE AND ACETAMINOPHEN 5; 325 MG/1; MG/1
TABLET ORAL
Refills: 0 | COMMUNITY
Start: 2018-02-10 | End: 2018-04-05 | Stop reason: ALTCHOICE

## 2018-04-05 RX ORDER — DULOXETIN HYDROCHLORIDE 30 MG/1
30 CAPSULE, DELAYED RELEASE ORAL
COMMUNITY
Start: 2017-10-23 | End: 2018-04-05 | Stop reason: SDUPTHER

## 2018-04-05 RX ORDER — LORAZEPAM 2 MG/1
TABLET ORAL
Status: ON HOLD | COMMUNITY
Start: 2017-07-03 | End: 2021-07-29 | Stop reason: HOSPADM

## 2018-04-05 RX ORDER — MAGNESIUM HYDROXIDE 1200 MG/15ML
LIQUID ORAL
COMMUNITY
Start: 2018-03-22 | End: 2018-04-05 | Stop reason: ALTCHOICE

## 2018-04-05 RX ORDER — CHOLECALCIFEROL (VITAMIN D3) 50 MCG
2000 TABLET ORAL DAILY
Qty: 30 TABLET | COMMUNITY
Start: 2018-04-05

## 2018-04-05 RX ORDER — DOXYCYCLINE HYCLATE 50 MG/1
CAPSULE, GELATIN COATED ORAL
Refills: 0 | COMMUNITY
Start: 2018-01-01 | End: 2018-04-05 | Stop reason: ALTCHOICE

## 2018-04-05 RX ORDER — ANASTROZOLE 1 MG/1
TABLET ORAL
Refills: 6 | COMMUNITY
Start: 2018-03-27 | End: 2019-07-06 | Stop reason: SDUPTHER

## 2018-04-05 RX ORDER — QUETIAPINE FUMARATE 25 MG/1
25-50 TABLET, FILM COATED ORAL
COMMUNITY
Start: 2018-02-28 | End: 2018-04-05 | Stop reason: ALTCHOICE

## 2018-04-05 RX ORDER — QUETIAPINE FUMARATE 25 MG/1
TABLET, FILM COATED ORAL
Refills: 2 | COMMUNITY
Start: 2018-02-28 | End: 2018-04-05 | Stop reason: ALTCHOICE

## 2018-04-05 RX ORDER — CALCIUM CARBONATE 500(1250)
500 TABLET ORAL DAILY
Refills: 0 | COMMUNITY
Start: 2018-04-05 | End: 2018-08-21 | Stop reason: ALTCHOICE

## 2018-04-05 ASSESSMENT — ENCOUNTER SYMPTOMS
ALLERGIC/IMMUNOLOGIC NEGATIVE: 1
GASTROINTESTINAL NEGATIVE: 1
EYES NEGATIVE: 1
RESPIRATORY NEGATIVE: 1

## 2018-04-05 ASSESSMENT — PATIENT HEALTH QUESTIONNAIRE - PHQ9
1. LITTLE INTEREST OR PLEASURE IN DOING THINGS: 0
SUM OF ALL RESPONSES TO PHQ9 QUESTIONS 1 & 2: 0
SUM OF ALL RESPONSES TO PHQ QUESTIONS 1-9: 0
2. FEELING DOWN, DEPRESSED OR HOPELESS: 0

## 2018-04-19 DIAGNOSIS — Z79.899 HIGH RISK MEDICATION USE: ICD-10-CM

## 2018-04-19 DIAGNOSIS — R53.82 CHRONIC FATIGUE: ICD-10-CM

## 2018-04-19 DIAGNOSIS — F17.200 TOBACCO USE DISORDER: ICD-10-CM

## 2018-04-19 LAB
ALBUMIN SERPL-MCNC: 4.3 G/DL (ref 3.9–4.9)
ALP BLD-CCNC: 61 U/L (ref 40–130)
ALT SERPL-CCNC: 17 U/L (ref 0–33)
ANION GAP SERPL CALCULATED.3IONS-SCNC: 14 MEQ/L (ref 7–13)
AST SERPL-CCNC: 18 U/L (ref 0–35)
BASOPHILS ABSOLUTE: 0 K/UL (ref 0–0.2)
BASOPHILS RELATIVE PERCENT: 1.1 %
BILIRUB SERPL-MCNC: 0.1 MG/DL (ref 0–1.2)
BUN BLDV-MCNC: 19 MG/DL (ref 8–23)
CALCIUM SERPL-MCNC: 8.9 MG/DL (ref 8.6–10.2)
CHLORIDE BLD-SCNC: 106 MEQ/L (ref 98–107)
CHOLESTEROL, TOTAL: 249 MG/DL (ref 0–199)
CO2: 24 MEQ/L (ref 22–29)
CREAT SERPL-MCNC: 0.76 MG/DL (ref 0.5–0.9)
EOSINOPHILS ABSOLUTE: 0.1 K/UL (ref 0–0.7)
EOSINOPHILS RELATIVE PERCENT: 2.3 %
FOLATE: >20 NG/ML (ref 7.3–26.1)
GFR AFRICAN AMERICAN: >60
GFR NON-AFRICAN AMERICAN: >60
GLOBULIN: 2.2 G/DL (ref 2.3–3.5)
GLUCOSE BLD-MCNC: 102 MG/DL (ref 74–109)
HCT VFR BLD CALC: 38.5 % (ref 37–47)
HDLC SERPL-MCNC: 63 MG/DL (ref 40–59)
HEMOGLOBIN: 12.2 G/DL (ref 12–16)
LDL CHOLESTEROL CALCULATED: 133 MG/DL (ref 0–129)
LYMPHOCYTES ABSOLUTE: 1.3 K/UL (ref 1–4.8)
LYMPHOCYTES RELATIVE PERCENT: 29.7 %
MCH RBC QN AUTO: 25.4 PG (ref 27–31.3)
MCHC RBC AUTO-ENTMCNC: 31.8 % (ref 33–37)
MCV RBC AUTO: 79.9 FL (ref 82–100)
MONOCYTES ABSOLUTE: 0.4 K/UL (ref 0.2–0.8)
MONOCYTES RELATIVE PERCENT: 8.6 %
NEUTROPHILS ABSOLUTE: 2.5 K/UL (ref 1.4–6.5)
NEUTROPHILS RELATIVE PERCENT: 58.3 %
PDW BLD-RTO: 17.7 % (ref 11.5–14.5)
PLATELET # BLD: 210 K/UL (ref 130–400)
POTASSIUM SERPL-SCNC: 4.5 MEQ/L (ref 3.5–5.1)
RBC # BLD: 4.82 M/UL (ref 4.2–5.4)
SODIUM BLD-SCNC: 144 MEQ/L (ref 132–144)
T4 FREE: 0.72 NG/DL (ref 0.93–1.7)
TOTAL PROTEIN: 6.5 G/DL (ref 6.4–8.1)
TRIGL SERPL-MCNC: 264 MG/DL (ref 0–200)
TSH REFLEX: 4.46 UIU/ML (ref 0.27–4.2)
VITAMIN B-12: 689 PG/ML (ref 232–1245)
WBC # BLD: 4.3 K/UL (ref 4.8–10.8)

## 2018-04-23 LAB
VITAMIN D2 AND D3, TOTAL: 32.9 NG/ML (ref 30–80)
VITAMIN D2, 25 HYDROXY: <1 NG/ML
VITAMIN D3,25 HYDROXY: 32.9 NG/ML

## 2018-05-10 ENCOUNTER — OFFICE VISIT (OUTPATIENT)
Dept: FAMILY MEDICINE CLINIC | Age: 58
End: 2018-05-10
Payer: COMMERCIAL

## 2018-05-10 VITALS
SYSTOLIC BLOOD PRESSURE: 122 MMHG | HEART RATE: 100 BPM | HEIGHT: 67 IN | RESPIRATION RATE: 12 BRPM | TEMPERATURE: 98.6 F | DIASTOLIC BLOOD PRESSURE: 80 MMHG | OXYGEN SATURATION: 98 % | WEIGHT: 189 LBS | BODY MASS INDEX: 29.66 KG/M2

## 2018-05-10 DIAGNOSIS — E03.4 HYPOTHYROIDISM DUE TO ACQUIRED ATROPHY OF THYROID: Primary | ICD-10-CM

## 2018-05-10 DIAGNOSIS — Z85.3 HISTORY OF BREAST CANCER: ICD-10-CM

## 2018-05-10 DIAGNOSIS — E78.2 MIXED HYPERLIPIDEMIA: ICD-10-CM

## 2018-05-10 DIAGNOSIS — Z12.31 SCREENING MAMMOGRAM, ENCOUNTER FOR: ICD-10-CM

## 2018-05-10 PROCEDURE — 99214 OFFICE O/P EST MOD 30 MIN: CPT | Performed by: FAMILY MEDICINE

## 2018-05-10 PROCEDURE — G8427 DOCREV CUR MEDS BY ELIG CLIN: HCPCS | Performed by: FAMILY MEDICINE

## 2018-05-10 PROCEDURE — 4004F PT TOBACCO SCREEN RCVD TLK: CPT | Performed by: FAMILY MEDICINE

## 2018-05-10 PROCEDURE — 3017F COLORECTAL CA SCREEN DOC REV: CPT | Performed by: FAMILY MEDICINE

## 2018-05-10 PROCEDURE — G8417 CALC BMI ABV UP PARAM F/U: HCPCS | Performed by: FAMILY MEDICINE

## 2018-05-10 RX ORDER — LEVOTHYROXINE SODIUM 0.03 MG/1
25 TABLET ORAL DAILY
Qty: 30 TABLET | Refills: 5 | Status: SHIPPED | OUTPATIENT
Start: 2018-05-10 | End: 2018-11-05 | Stop reason: SDUPTHER

## 2018-05-10 ASSESSMENT — ENCOUNTER SYMPTOMS
EYES NEGATIVE: 1
RESPIRATORY NEGATIVE: 1
ALLERGIC/IMMUNOLOGIC NEGATIVE: 1
GASTROINTESTINAL NEGATIVE: 1

## 2018-05-22 ENCOUNTER — HOSPITAL ENCOUNTER (OUTPATIENT)
Dept: WOMENS IMAGING | Age: 58
Discharge: HOME | End: 2018-05-24
Payer: COMMERCIAL

## 2018-05-22 DIAGNOSIS — Z85.3 HISTORY OF BREAST CANCER: ICD-10-CM

## 2018-05-22 DIAGNOSIS — Z12.31 SCREENING MAMMOGRAM, ENCOUNTER FOR: ICD-10-CM

## 2018-05-22 PROCEDURE — 77063 BREAST TOMOSYNTHESIS BI: CPT

## 2018-08-07 DIAGNOSIS — E03.4 HYPOTHYROIDISM DUE TO ACQUIRED ATROPHY OF THYROID: ICD-10-CM

## 2018-08-07 DIAGNOSIS — Z85.3 HISTORY OF BREAST CANCER: ICD-10-CM

## 2018-08-07 DIAGNOSIS — E78.2 MIXED HYPERLIPIDEMIA: ICD-10-CM

## 2018-08-07 LAB
ALBUMIN SERPL-MCNC: 4 G/DL (ref 3.9–4.9)
ALP BLD-CCNC: 55 U/L (ref 40–130)
ALT SERPL-CCNC: 17 U/L (ref 0–33)
ANION GAP SERPL CALCULATED.3IONS-SCNC: 17 MEQ/L (ref 7–13)
ANISOCYTOSIS: ABNORMAL
AST SERPL-CCNC: 20 U/L (ref 0–35)
BASOPHILS ABSOLUTE: 0.2 K/UL (ref 0–0.2)
BASOPHILS RELATIVE PERCENT: 5 %
BILIRUB SERPL-MCNC: <0.2 MG/DL (ref 0–1.2)
BUN BLDV-MCNC: 9 MG/DL (ref 8–23)
CALCIUM SERPL-MCNC: 8.9 MG/DL (ref 8.6–10.2)
CHLORIDE BLD-SCNC: 102 MEQ/L (ref 98–107)
CHOLESTEROL, TOTAL: 202 MG/DL (ref 0–199)
CO2: 21 MEQ/L (ref 22–29)
CREAT SERPL-MCNC: 0.63 MG/DL (ref 0.5–0.9)
EOSINOPHILS ABSOLUTE: 0.2 K/UL (ref 0–0.7)
EOSINOPHILS RELATIVE PERCENT: 5 %
GFR AFRICAN AMERICAN: >60
GFR NON-AFRICAN AMERICAN: >60
GLOBULIN: 2.7 G/DL (ref 2.3–3.5)
GLUCOSE BLD-MCNC: 101 MG/DL (ref 74–109)
HCT VFR BLD CALC: 42.1 % (ref 37–47)
HDLC SERPL-MCNC: 56 MG/DL (ref 40–59)
HEMOGLOBIN: 14.4 G/DL (ref 12–16)
LDL CHOLESTEROL CALCULATED: 121 MG/DL (ref 0–129)
LYMPHOCYTES ABSOLUTE: 1 K/UL (ref 1–4.8)
LYMPHOCYTES RELATIVE PERCENT: 30 %
MCH RBC QN AUTO: 29 PG (ref 27–31.3)
MCHC RBC AUTO-ENTMCNC: 34.2 % (ref 33–37)
MCV RBC AUTO: 84.6 FL (ref 82–100)
MICROCYTES: ABNORMAL
MONOCYTES ABSOLUTE: 0.3 K/UL (ref 0.2–0.8)
MONOCYTES RELATIVE PERCENT: 9.3 %
NEUTROPHILS ABSOLUTE: 1.7 K/UL (ref 1.4–6.5)
NEUTROPHILS RELATIVE PERCENT: 51 %
PDW BLD-RTO: 17 % (ref 11.5–14.5)
PLATELET # BLD: 219 K/UL (ref 130–400)
POIKILOCYTES: ABNORMAL
POTASSIUM SERPL-SCNC: 5.1 MEQ/L (ref 3.5–5.1)
RBC # BLD: 4.98 M/UL (ref 4.2–5.4)
SMUDGE CELLS: 0.9
SODIUM BLD-SCNC: 140 MEQ/L (ref 132–144)
T4 FREE: 0.9 NG/DL (ref 0.93–1.7)
TOTAL PROTEIN: 6.7 G/DL (ref 6.4–8.1)
TRIGL SERPL-MCNC: 124 MG/DL (ref 0–200)
TSH SERPL DL<=0.05 MIU/L-ACNC: 2.18 UIU/ML (ref 0.27–4.2)
WBC # BLD: 3.3 K/UL (ref 4.8–10.8)

## 2018-08-21 ENCOUNTER — OFFICE VISIT (OUTPATIENT)
Dept: FAMILY MEDICINE CLINIC | Age: 58
End: 2018-08-21
Payer: COMMERCIAL

## 2018-08-21 VITALS
HEART RATE: 79 BPM | HEIGHT: 67 IN | WEIGHT: 172 LBS | RESPIRATION RATE: 12 BRPM | BODY MASS INDEX: 27 KG/M2 | OXYGEN SATURATION: 98 % | DIASTOLIC BLOOD PRESSURE: 80 MMHG | SYSTOLIC BLOOD PRESSURE: 122 MMHG | TEMPERATURE: 96.8 F

## 2018-08-21 DIAGNOSIS — Z12.11 COLON CANCER SCREENING: ICD-10-CM

## 2018-08-21 DIAGNOSIS — F17.200 TOBACCO USE DISORDER: ICD-10-CM

## 2018-08-21 DIAGNOSIS — E03.4 HYPOTHYROIDISM DUE TO ACQUIRED ATROPHY OF THYROID: Primary | ICD-10-CM

## 2018-08-21 PROCEDURE — 3017F COLORECTAL CA SCREEN DOC REV: CPT | Performed by: FAMILY MEDICINE

## 2018-08-21 PROCEDURE — G8417 CALC BMI ABV UP PARAM F/U: HCPCS | Performed by: FAMILY MEDICINE

## 2018-08-21 PROCEDURE — 99214 OFFICE O/P EST MOD 30 MIN: CPT | Performed by: FAMILY MEDICINE

## 2018-08-21 PROCEDURE — 4004F PT TOBACCO SCREEN RCVD TLK: CPT | Performed by: FAMILY MEDICINE

## 2018-08-21 PROCEDURE — G8427 DOCREV CUR MEDS BY ELIG CLIN: HCPCS | Performed by: FAMILY MEDICINE

## 2018-08-21 RX ORDER — BUPROPION HYDROCHLORIDE 150 MG/1
150 TABLET, EXTENDED RELEASE ORAL 2 TIMES DAILY
Qty: 60 TABLET | Refills: 3 | Status: SHIPPED | OUTPATIENT
Start: 2018-08-21 | End: 2018-09-19

## 2018-08-21 ASSESSMENT — ENCOUNTER SYMPTOMS
GASTROINTESTINAL NEGATIVE: 1
ALLERGIC/IMMUNOLOGIC NEGATIVE: 1
RESPIRATORY NEGATIVE: 1
EYES NEGATIVE: 1

## 2018-08-21 NOTE — PROGRESS NOTES
 Not on file     Social History Narrative    No narrative on file     Family History   Problem Relation Age of Onset    High Blood Pressure Mother     High Cholesterol Mother     Cancer Father         brain cancer    Stroke Father     Other Father         ulcerative colitis    High Blood Pressure Father     Cancer Sister         uterine cancer    High Blood Pressure Sister     High Blood Pressure Brother      Allergies   Allergen Reactions    Sulfa Antibiotics Hives     Current Outpatient Prescriptions on File Prior to Visit   Medication Sig Dispense Refill    levothyroxine (SYNTHROID) 25 MCG tablet Take 1 tablet by mouth Daily 30 tablet 5    LORazepam (ATIVAN) 2 MG tablet Take one tablet 3-4 times daily as needed not to exceed 105 per month.  Cholecalciferol (VITAMIN D) 2000 units TABS tablet Take 1 tablet by mouth daily 30 tablet     SUMAtriptan (IMITREX) 100 MG tablet Take 100 mg by mouth      DULoxetine (CYMBALTA) 30 MG extended release capsule Take 30 mg by mouth      gabapentin (NEURONTIN) 300 MG capsule Take 300 mg by mouth 3 times daily. Lorren Lesches anastrozole (ARIMIDEX) 1 MG tablet TAKE 1 TABLET BY MOUTH EVERY DAY  6     No current facility-administered medications on file prior to visit. Objective    Vitals:    08/21/18 0837   BP: 122/80   Pulse: 79   Resp: 12   Temp: 96.8 °F (36 °C)   TempSrc: Tympanic   SpO2: 98%   Weight: 172 lb (78 kg)   Height: 5' 7\" (1.702 m)     Physical Exam   Constitutional: Vital signs are normal. She appears well-developed and well-nourished. No distress. HENT:   Head: Normocephalic and atraumatic. Right Ear: Tympanic membrane, external ear and ear canal normal. Tympanic membrane is not erythematous and not retracted. No middle ear effusion. Left Ear: Tympanic membrane, external ear and ear canal normal. Tympanic membrane is not erythematous and not retracted. No middle ear effusion.    Nose: Nose normal. No mucosal edema, rhinorrhea or septal deviation. Right sinus exhibits no maxillary sinus tenderness and no frontal sinus tenderness. Left sinus exhibits no maxillary sinus tenderness and no frontal sinus tenderness. Mouth/Throat: Uvula is midline, oropharynx is clear and moist and mucous membranes are normal.   Eyes: Pupils are equal, round, and reactive to light. Conjunctivae, EOM and lids are normal.   Neck: Trachea normal and normal range of motion. Neck supple. No JVD present. Carotid bruit is not present. No tracheal deviation present. No thyroid mass and no thyromegaly present. Cardiovascular: Normal rate, regular rhythm, S1 normal, S2 normal, normal heart sounds, intact distal pulses and normal pulses. Pulmonary/Chest: Effort normal and breath sounds normal. No respiratory distress. She has no decreased breath sounds. She has no wheezes. She has no rhonchi. She has no rales. She exhibits no tenderness and no deformity. Abdominal: Soft. Normal appearance and bowel sounds are normal. She exhibits no distension. There is no splenomegaly or hepatomegaly. There is no tenderness. There is no rigidity, no rebound, no guarding and no CVA tenderness. No hernia. Musculoskeletal:        Right knee: Normal.        Left knee: Normal.        Cervical back: Normal.        Thoracic back: Normal.        Lumbar back: Normal.   Lymphadenopathy:     She has no cervical adenopathy. Neurological: She is alert. She has normal strength. No cranial nerve deficit or sensory deficit. Coordination and gait normal.   Skin: Skin is warm, dry and intact. No rash noted. No erythema. Psychiatric: She has a normal mood and affect. Her speech is normal. Judgment and thought content normal. Cognition and memory are normal.            Assessment & Plan    Diagnosis Orders   1. Hypothyroidism due to acquired atrophy of thyroid  CBC Auto Differential    Comprehensive Metabolic Panel    Lipid Panel    TSH without Reflex    T4, Free   2.  Colon cancer screening  Ambulatory

## 2018-08-27 ENCOUNTER — OFFICE VISIT (OUTPATIENT)
Dept: OBGYN CLINIC | Age: 58
End: 2018-08-27
Payer: COMMERCIAL

## 2018-08-27 VITALS
WEIGHT: 171.8 LBS | DIASTOLIC BLOOD PRESSURE: 80 MMHG | SYSTOLIC BLOOD PRESSURE: 118 MMHG | BODY MASS INDEX: 26.97 KG/M2 | HEIGHT: 67 IN

## 2018-08-27 DIAGNOSIS — N95.2 ATROPHIC VAGINITIS: ICD-10-CM

## 2018-08-27 DIAGNOSIS — N94.10 DYSPAREUNIA IN FEMALE: ICD-10-CM

## 2018-08-27 DIAGNOSIS — Z85.3 HX OF BREAST CANCER: ICD-10-CM

## 2018-08-27 DIAGNOSIS — Z01.419 ENCOUNTER FOR WELL WOMAN EXAM WITH ROUTINE GYNECOLOGICAL EXAM: Primary | ICD-10-CM

## 2018-08-27 DIAGNOSIS — Z11.51 ENCOUNTER FOR SCREENING FOR HUMAN PAPILLOMAVIRUS (HPV): ICD-10-CM

## 2018-08-27 PROCEDURE — 99386 PREV VISIT NEW AGE 40-64: CPT | Performed by: OBSTETRICS & GYNECOLOGY

## 2018-08-27 PROCEDURE — 4004F PT TOBACCO SCREEN RCVD TLK: CPT | Performed by: OBSTETRICS & GYNECOLOGY

## 2018-08-27 PROCEDURE — 99203 OFFICE O/P NEW LOW 30 MIN: CPT | Performed by: OBSTETRICS & GYNECOLOGY

## 2018-08-27 PROCEDURE — G8417 CALC BMI ABV UP PARAM F/U: HCPCS | Performed by: OBSTETRICS & GYNECOLOGY

## 2018-08-27 PROCEDURE — 3017F COLORECTAL CA SCREEN DOC REV: CPT | Performed by: OBSTETRICS & GYNECOLOGY

## 2018-08-27 PROCEDURE — G8427 DOCREV CUR MEDS BY ELIG CLIN: HCPCS | Performed by: OBSTETRICS & GYNECOLOGY

## 2018-08-27 ASSESSMENT — ENCOUNTER SYMPTOMS
COLOR CHANGE: 0
CONSTIPATION: 0
SINUS PRESSURE: 0
VOMITING: 0
NAUSEA: 0
SHORTNESS OF BREATH: 0
BLOOD IN STOOL: 0
ABDOMINAL PAIN: 0
WHEEZING: 0
COUGH: 0

## 2018-08-27 NOTE — PROGRESS NOTES
History and Physical  [facility]   [address]  P: [phone] / F: [phone]  Keith Purvis  2018              59 y.o. Chief Complaint   Patient presents with    Dyspareunia     pt c/o excuriating pain with intercourse x1 year.  New Patient    Student     ok        /80   Ht 5' 7\" (1.702 m)   Wt 171 lb 12.8 oz (77.9 kg)   BMI 26.91 kg/m²   Alllergies:  Sulfa antibiotics               Primary Care Physician: Tonja Pugh DO    HPI : Keith Purvis is a 59 y.o. female  The patient was seen and examined. She has no chief complaint today and is here for her annual exam.  Her bowels are regular. There are no voiding complaints. She denies any bloating. She denies vaginal discharge and was counseled on STD's and the need for barrier contraception. All ?s answered. Pt c/o severe dyspareunia, and vaginal dryness. Pt reports sexual intercourse is not only painful but difficult due to increased vaginal dryness. On exam pt with atrophic changes and loss of vaginal rugae. Pt denies any vaginal bleeding. Risks, benefits and alternative therapies for treatment discussed. Pt does report she has a hx of breast cancer and is unsure about HRT. Pt does not currently have an gyn/onc that is following her post care. Referral placed for consult, pt to find out what kind of treatment options gyn/onc recommends for patients vaginal atrophy. Pt advised to use OTC lubricants such as astorglide and the assistance of vaginal dilators prior to intercourse.  Pt to RTO after gyn/onc consult ALL?s answered       ________________________________________________________________________  Obstetric History       T0      L2     SAB0   TAB0   Ectopic0   Molar0   Multiple0   Live Births0       # Outcome Date GA Lbr Marciano/2nd Weight Sex Delivery Anes PTL Lv   2             1 [place]                 Past Medical History: Diagnosis Date    Anxiety     Cancer [facility] 05/10/2017    DCIS left breast    Depression     Migraines                                                                    Past Surgical History:   Procedure Laterality Date    BREAST BIOPSY Left 5/23/2017    INJECTION METHYLENE BLUE LEFT BREAST ULTRASOUND GUIDED LEFT BREAST LUMPECTOMY LEFT SENTINEL NODE BIOPSY, 90 MINS, EVICEL 2ML, PAT ON ADMIT  performed by Juan Carlos Armstrong MD at [address]      t flap     BREAST SURGERY Left 6/27/13    exploration of left nipple with excisonal bx of ducts    BUNIONECTOMY      2006   [address], [address]    HYSTERECTOMY      HYSTERECTOMY, TOTAL ABDOMINAL      with BSO    MASTECTOMY Left     TONSILLECTOMY  1974     Family History   Problem Relation Age of Onset    High Blood Pressure Mother     High Cholesterol Mother     Cancer Father         brain cancer    Stroke Father     Other Father         ulcerative colitis    High Blood Pressure Father     Cancer Sister         uterine cancer    High Blood Pressure Sister     High Blood Pressure Brother      Social History     Social History    Marital status:      Spouse name: N/A    Number of children: N/A    Years of education: N/A     Occupational History    Not on file.      Social History Main Topics    Smoking status: Current Every Day Smoker     Packs/day: 1.00     Years: 13.00     Types: Cigarettes    Smokeless tobacco: Never Used      Comment: start age 10    Alcohol use Yes      Comment: occasional     Drug use: No    Sexual activity: Yes     Other Topics Concern    Not on file     Social History Narrative    No narrative on file         MEDICATIONS:  Current Outpatient Prescriptions on File Prior to Visit   Medication Sig Dispense Refill    Calcium Carbonate Antacid (TUMS PO) Take by mouth      buPROPion (WELLBUTRIN SR) 150 MG extended release tablet Take 1 tablet by mouth 2 times daily 60 tablet 3   

## 2018-09-05 ENCOUNTER — TELEPHONE (OUTPATIENT)
Dept: OBGYN CLINIC | Age: 58
End: 2018-09-05

## 2018-09-05 DIAGNOSIS — Z01.419 ENCOUNTER FOR WELL WOMAN EXAM WITH ROUTINE GYNECOLOGICAL EXAM: ICD-10-CM

## 2018-09-05 DIAGNOSIS — Z11.51 ENCOUNTER FOR SCREENING FOR HUMAN PAPILLOMAVIRUS (HPV): ICD-10-CM

## 2018-09-05 NOTE — TELEPHONE ENCOUNTER
Eleonora Rivero is calling for results of her pap. Performed on: 08/31/2018 by Dr. Ben Wolf: Eleonora Rivero can be reached at [phone]. OK to leave detailed message? Yes      Patient was made aware that her pap smear was WNL, please print and mail patient normal pap letter.

## 2018-09-19 ENCOUNTER — OFFICE VISIT (OUTPATIENT)
Dept: FAMILY MEDICINE CLINIC | Age: 58
End: 2018-09-19
Payer: COMMERCIAL

## 2018-09-19 VITALS
DIASTOLIC BLOOD PRESSURE: 72 MMHG | SYSTOLIC BLOOD PRESSURE: 128 MMHG | TEMPERATURE: 98 F | HEIGHT: 67 IN | HEART RATE: 83 BPM | BODY MASS INDEX: 26.37 KG/M2 | WEIGHT: 168 LBS | OXYGEN SATURATION: 98 % | RESPIRATION RATE: 16 BRPM

## 2018-09-19 DIAGNOSIS — Z72.0 TOBACCO USE: ICD-10-CM

## 2018-09-19 DIAGNOSIS — E03.9 ACQUIRED HYPOTHYROIDISM: ICD-10-CM

## 2018-09-19 DIAGNOSIS — R22.2 ABDOMINAL WALL LUMP: Primary | ICD-10-CM

## 2018-09-19 PROCEDURE — 3017F COLORECTAL CA SCREEN DOC REV: CPT | Performed by: INTERNAL MEDICINE

## 2018-09-19 PROCEDURE — 99214 OFFICE O/P EST MOD 30 MIN: CPT | Performed by: INTERNAL MEDICINE

## 2018-09-19 PROCEDURE — G8427 DOCREV CUR MEDS BY ELIG CLIN: HCPCS | Performed by: INTERNAL MEDICINE

## 2018-09-19 PROCEDURE — G8417 CALC BMI ABV UP PARAM F/U: HCPCS | Performed by: INTERNAL MEDICINE

## 2018-09-19 PROCEDURE — 4004F PT TOBACCO SCREEN RCVD TLK: CPT | Performed by: INTERNAL MEDICINE

## 2018-09-19 RX ORDER — SUMATRIPTAN 100 MG/1
100 TABLET, FILM COATED ORAL
Qty: 9 TABLET | Refills: 11 | Status: SHIPPED | OUTPATIENT
Start: 2018-09-19 | End: 2018-10-09 | Stop reason: SDUPTHER

## 2018-09-19 ASSESSMENT — ENCOUNTER SYMPTOMS
BACK PAIN: 0
ABDOMINAL PAIN: 0
EYE PAIN: 0
SHORTNESS OF BREATH: 0

## 2018-09-19 NOTE — PROGRESS NOTES
 No narrative on file     Allergies   Allergen Reactions    Sulfa Antibiotics Hives     Current Outpatient Prescriptions on File Prior to Visit   Medication Sig Dispense Refill    Calcium Carbonate Antacid (TUMS PO) Take by mouth      levothyroxine (SYNTHROID) 25 MCG tablet Take 1 tablet by mouth Daily 30 tablet 5    anastrozole (ARIMIDEX) 1 MG tablet TAKE 1 TABLET BY MOUTH EVERY DAY  6    LORazepam (ATIVAN) 2 MG tablet Take one tablet 3-4 times daily as needed not to exceed 105 per month.  Cholecalciferol (VITAMIN D) 2000 units TABS tablet Take 1 tablet by mouth daily 30 tablet     SUMAtriptan (IMITREX) 100 MG tablet Take 100 mg by mouth      DULoxetine (CYMBALTA) 30 MG extended release capsule Take 30 mg by mouth      gabapentin (NEURONTIN) 300 MG capsule Take 300 mg by mouth 3 times daily. .       No current facility-administered medications on file prior to visit. I have personally reviewed the ROS, PMH, PFH, and social history     Review of Systems   Constitutional: Negative for chills and fever. HENT: Negative for congestion. Eyes: Negative for pain. Respiratory: Negative for shortness of breath. Cardiovascular: Negative for chest pain. Gastrointestinal: Negative for abdominal pain. +Abdominal lump   Genitourinary: Negative for hematuria. Musculoskeletal: Negative for back pain. Allergic/Immunologic: Negative for immunocompromised state. Neurological: Negative for headaches. Psychiatric/Behavioral: Negative for hallucinations. Objective:   /72 (Site: Left Upper Arm, Position: Sitting, Cuff Size: Large Adult)   Pulse 83   Temp 98 °F (36.7 °C) (Tympanic)   Resp 16   Ht 5' 7\" (1.702 m)   Wt 168 lb (76.2 kg)   SpO2 98%   BMI 26.31 kg/m²     Physical Exam   Constitutional: She is oriented to person, place, and time. She appears well-developed and well-nourished. HENT:   Head: Normocephalic. Eyes: Pupils are equal, round, and reactive to light. Neck: No tracheal deviation present. Cardiovascular: Normal rate, regular rhythm and normal heart sounds. Exam reveals no gallop and no friction rub. No murmur heard. Pulmonary/Chest: No respiratory distress. Abdominal: Soft. Bowel sounds are normal. She exhibits no distension. There is no tenderness. There is no rebound and no guarding. Palpated mass in right middle abdomen    Musculoskeletal: She exhibits no edema. Neurological: She is oriented to person, place, and time. Skin: Skin is warm and dry. Assessment:       Diagnosis Orders   1. Abdominal wall lump  CT ABDOMEN PELVIS W WO CONTRAST Additional Contrast? Radiologist Recommendation   2. Tobacco use     3. Acquired hypothyroidism           Plan:   CT abdomen pelvis to further investigate mass  Unsure if lipoma vs hernia vs other. Given history of breast cancer as well, investigate with imaging. Counseled to quit tobacco.   Continue synthroid. I discussed the risk of incarceration, bowel strangulation, sepsis, and even death. Patient will call 911 immediately if anything changes before then. Follow up after CT abdomen completed. She appears non toxic and in no pain or distress. Orders Placed This Encounter   Procedures    CT ABDOMEN PELVIS W WO CONTRAST Additional Contrast? Radiologist Recommendation     Standing Status:   Future     Standing Expiration Date:   9/19/2019     Order Specific Question:   Additional Contrast?     Answer:   Radiologist Recommendation     Order Specific Question:   Reason for exam:     Answer:   right mid abdomen sq nodule, history of breast cancer     No orders of the defined types were placed in this encounter. No Follow-up on file.       Rogelio Spencer MD

## 2018-09-25 ENCOUNTER — HOSPITAL ENCOUNTER (OUTPATIENT)
Age: 58
Setting detail: OUTPATIENT SURGERY
Discharge: HOME | End: 2018-09-25
Attending: INTERNAL MEDICINE | Admitting: INTERNAL MEDICINE
Payer: COMMERCIAL

## 2018-09-25 ENCOUNTER — ANESTHESIA EVENT (OUTPATIENT)
Dept: ENDOSCOPY | Age: 58
End: 2018-09-25
Payer: COMMERCIAL

## 2018-09-25 ENCOUNTER — ANESTHESIA (OUTPATIENT)
Dept: ENDOSCOPY | Age: 58
End: 2018-09-25
Payer: COMMERCIAL

## 2018-09-25 VITALS
DIASTOLIC BLOOD PRESSURE: 79 MMHG | BODY MASS INDEX: 26.37 KG/M2 | HEIGHT: 67 IN | HEART RATE: 70 BPM | RESPIRATION RATE: 18 BRPM | TEMPERATURE: 98.8 F | WEIGHT: 168 LBS | SYSTOLIC BLOOD PRESSURE: 132 MMHG | OXYGEN SATURATION: 100 %

## 2018-09-25 VITALS
OXYGEN SATURATION: 98 % | DIASTOLIC BLOOD PRESSURE: 55 MMHG | SYSTOLIC BLOOD PRESSURE: 109 MMHG | RESPIRATION RATE: 22 BRPM

## 2018-09-25 PROCEDURE — 2580000003 HC RX 258: Performed by: INTERNAL MEDICINE

## 2018-09-25 PROCEDURE — 6360000002 HC RX W HCPCS: Performed by: NURSE ANESTHETIST, CERTIFIED REGISTERED

## 2018-09-25 PROCEDURE — 3700000000 HC ANESTHESIA ATTENDED CARE: Performed by: INTERNAL MEDICINE

## 2018-09-25 PROCEDURE — 45378 DIAGNOSTIC COLONOSCOPY: CPT | Performed by: INTERNAL MEDICINE

## 2018-09-25 PROCEDURE — 7100000010 HC PHASE II RECOVERY - FIRST 15 MIN: Performed by: INTERNAL MEDICINE

## 2018-09-25 PROCEDURE — 3700000001 HC ADD 15 MINUTES (ANESTHESIA): Performed by: INTERNAL MEDICINE

## 2018-09-25 PROCEDURE — 3609027000 HC COLONOSCOPY: Performed by: INTERNAL MEDICINE

## 2018-09-25 PROCEDURE — 2500000003 HC RX 250 WO HCPCS: Performed by: NURSE ANESTHETIST, CERTIFIED REGISTERED

## 2018-09-25 PROCEDURE — 7100000011 HC PHASE II RECOVERY - ADDTL 15 MIN: Performed by: INTERNAL MEDICINE

## 2018-09-25 RX ORDER — ONDANSETRON 2 MG/ML
4 INJECTION INTRAMUSCULAR; INTRAVENOUS
Status: DISCONTINUED | OUTPATIENT
Start: 2018-09-25 | End: 2018-09-25 | Stop reason: HOSPADM

## 2018-09-25 RX ORDER — LIDOCAINE HYDROCHLORIDE 10 MG/ML
1 INJECTION, SOLUTION EPIDURAL; INFILTRATION; INTRACAUDAL; PERINEURAL
Status: DISCONTINUED | OUTPATIENT
Start: 2018-09-25 | End: 2018-09-25 | Stop reason: HOSPADM

## 2018-09-25 RX ORDER — LIDOCAINE HYDROCHLORIDE 20 MG/ML
INJECTION, SOLUTION INFILTRATION; PERINEURAL PRN
Status: DISCONTINUED | OUTPATIENT
Start: 2018-09-25 | End: 2018-09-25 | Stop reason: SDUPTHER

## 2018-09-25 RX ORDER — SODIUM CHLORIDE 0.9 % (FLUSH) 0.9 %
10 SYRINGE (ML) INJECTION PRN
Status: DISCONTINUED | OUTPATIENT
Start: 2018-09-25 | End: 2018-09-25 | Stop reason: HOSPADM

## 2018-09-25 RX ORDER — PROPOFOL 10 MG/ML
INJECTION, EMULSION INTRAVENOUS PRN
Status: DISCONTINUED | OUTPATIENT
Start: 2018-09-25 | End: 2018-09-25 | Stop reason: SDUPTHER

## 2018-09-25 RX ORDER — SODIUM CHLORIDE 0.9 % (FLUSH) 0.9 %
10 SYRINGE (ML) INJECTION EVERY 12 HOURS SCHEDULED
Status: DISCONTINUED | OUTPATIENT
Start: 2018-09-25 | End: 2018-09-25 | Stop reason: HOSPADM

## 2018-09-25 RX ORDER — SODIUM CHLORIDE 9 MG/ML
INJECTION, SOLUTION INTRAVENOUS CONTINUOUS
Status: DISCONTINUED | OUTPATIENT
Start: 2018-09-25 | End: 2018-09-25 | Stop reason: HOSPADM

## 2018-09-25 RX ADMIN — PROPOFOL 20 MG: 10 INJECTION, EMULSION INTRAVENOUS at 10:02

## 2018-09-25 RX ADMIN — PROPOFOL 20 MG: 10 INJECTION, EMULSION INTRAVENOUS at 09:52

## 2018-09-25 RX ADMIN — PROPOFOL 50 MG: 10 INJECTION, EMULSION INTRAVENOUS at 09:49

## 2018-09-25 RX ADMIN — LIDOCAINE HYDROCHLORIDE 40 MG: 20 INJECTION, SOLUTION INFILTRATION; PERINEURAL at 09:49

## 2018-09-25 RX ADMIN — PROPOFOL 20 MG: 10 INJECTION, EMULSION INTRAVENOUS at 09:51

## 2018-09-25 RX ADMIN — PROPOFOL 20 MG: 10 INJECTION, EMULSION INTRAVENOUS at 09:57

## 2018-09-25 RX ADMIN — PROPOFOL 20 MG: 10 INJECTION, EMULSION INTRAVENOUS at 09:55

## 2018-09-25 RX ADMIN — SODIUM CHLORIDE: 9 INJECTION, SOLUTION INTRAVENOUS at 09:38

## 2018-09-25 RX ADMIN — PROPOFOL 20 MG: 10 INJECTION, EMULSION INTRAVENOUS at 09:58

## 2018-09-25 RX ADMIN — PROPOFOL 20 MG: 10 INJECTION, EMULSION INTRAVENOUS at 09:54

## 2018-09-25 RX ADMIN — PROPOFOL 20 MG: 10 INJECTION, EMULSION INTRAVENOUS at 09:53

## 2018-09-25 RX ADMIN — PROPOFOL 20 MG: 10 INJECTION, EMULSION INTRAVENOUS at 09:56

## 2018-09-25 RX ADMIN — PROPOFOL 20 MG: 10 INJECTION, EMULSION INTRAVENOUS at 10:00

## 2018-09-25 ASSESSMENT — LIFESTYLE VARIABLES: SMOKING_STATUS: 1

## 2018-09-25 ASSESSMENT — PAIN - FUNCTIONAL ASSESSMENT: PAIN_FUNCTIONAL_ASSESSMENT: 0-10

## 2018-09-25 NOTE — ANESTHESIA PRE PROCEDURE
08/07/2018       CMP:   Lab Results   Component Value Date     08/07/2018    K 5.1 08/07/2018     08/07/2018    CO2 21 08/07/2018    BUN 9 08/07/2018    CREATININE 0.63 08/07/2018    GFRAA >60.0 08/07/2018    LABGLOM >60.0 08/07/2018    GLUCOSE 101 08/07/2018    PROT 6.7 08/07/2018    CALCIUM 8.9 08/07/2018    BILITOT <0.2 08/07/2018    ALKPHOS 55 08/07/2018    AST 20 08/07/2018    ALT 17 08/07/2018       POC Tests: No results for input(s): POCGLU, POCNA, POCK, POCCL, POCBUN, POCHEMO, POCHCT in the last 72 hours. Coags:   Lab Results   Component Value Date    PROTIME 9.3 01/28/2016    INR 1.0 01/28/2016    APTT 25.0 01/28/2016       HCG (If Applicable):   Lab Results   Component Value Date    PREGTESTUR Negative 01/28/2016        ABGs: No results found for: PHART, PO2ART, XSL2VSR, KKB5RKG, BEART, P7DAXPDA     Type & Screen (If Applicable):  No results found for: LABABO, [address]    Anesthesia Evaluation  Patient summary reviewed and Nursing notes reviewed  Airway: Mallampati: II  TM distance: >3 FB   Neck ROM: full  Mouth opening: > = 3 FB Dental: normal exam         Pulmonary:normal exam    (+) current smoker          Patient smoked on day of surgery. Cardiovascular:Negative CV ROS             Beta Blocker:  Not on Beta Blocker         Neuro/Psych:   (+) headaches: migraine headaches, psychiatric history:depression/anxiety             GI/Hepatic/Renal:   (+) bowel prep,          ROS comment: Fatty liver. Endo/Other:    (+) hypothyroidism::., .          Pt had no PAT visit        ROS comment: Breast cancer Abdominal:           Vascular: negative vascular ROS. Anesthesia Plan      MAC     ASA 2       Induction: intravenous. Anesthetic plan and risks discussed with patient. Plan discussed with CRNA.                   Harvey Davis, APRN - CRNA   9/25/2018

## 2018-09-26 ENCOUNTER — HOSPITAL ENCOUNTER (OUTPATIENT)
Dept: CT IMAGING | Age: 58
Discharge: HOME | End: 2018-09-28
Payer: COMMERCIAL

## 2018-09-26 DIAGNOSIS — R22.2 ABDOMINAL WALL LUMP: ICD-10-CM

## 2018-09-26 PROCEDURE — 74177 CT ABD & PELVIS W/CONTRAST: CPT

## 2018-09-26 PROCEDURE — 2500000003 HC RX 250 WO HCPCS: Performed by: INTERNAL MEDICINE

## 2018-09-26 PROCEDURE — 6360000004 HC RX CONTRAST MEDICATION: Performed by: INTERNAL MEDICINE

## 2018-09-26 RX ADMIN — BARIUM SULFATE 450 ML: 20 SUSPENSION ORAL at 12:38

## 2018-09-28 ENCOUNTER — OFFICE VISIT (OUTPATIENT)
Dept: FAMILY MEDICINE CLINIC | Age: 58
End: 2018-09-28
Payer: COMMERCIAL

## 2018-09-28 VITALS
OXYGEN SATURATION: 98 % | WEIGHT: 166 LBS | DIASTOLIC BLOOD PRESSURE: 80 MMHG | HEIGHT: 67 IN | BODY MASS INDEX: 26.06 KG/M2 | SYSTOLIC BLOOD PRESSURE: 138 MMHG | TEMPERATURE: 97.4 F | RESPIRATION RATE: 16 BRPM | HEART RATE: 80 BPM

## 2018-09-28 DIAGNOSIS — K76.9 HEPATIC LESION: Primary | ICD-10-CM

## 2018-09-28 DIAGNOSIS — Z85.3 HISTORY OF BREAST CANCER: ICD-10-CM

## 2018-09-28 PROCEDURE — G8427 DOCREV CUR MEDS BY ELIG CLIN: HCPCS | Performed by: FAMILY MEDICINE

## 2018-09-28 PROCEDURE — 99213 OFFICE O/P EST LOW 20 MIN: CPT | Performed by: FAMILY MEDICINE

## 2018-09-28 PROCEDURE — G8417 CALC BMI ABV UP PARAM F/U: HCPCS | Performed by: FAMILY MEDICINE

## 2018-09-28 PROCEDURE — 3017F COLORECTAL CA SCREEN DOC REV: CPT | Performed by: FAMILY MEDICINE

## 2018-09-28 PROCEDURE — 4004F PT TOBACCO SCREEN RCVD TLK: CPT | Performed by: FAMILY MEDICINE

## 2018-09-28 ASSESSMENT — ENCOUNTER SYMPTOMS
ABDOMINAL PAIN: 1
RESPIRATORY NEGATIVE: 1

## 2018-09-28 NOTE — PROGRESS NOTES
Subjective  Rashard Varghese, 59 y.o. female presents today with:  Chief Complaint   Patient presents with    Follow-up    Results     CT       Follow up CT abdomen. Done for eval of abdominal lump in setting of prior breast ca-  Concerned re hypodensity in liver found        Review of Systems   Constitutional: Negative. HENT: Negative. Respiratory: Negative. Cardiovascular: Negative. Gastrointestinal: Positive for abdominal pain (tebnder lump). Genitourinary: Negative. Musculoskeletal: Negative. Skin: Negative. Neurological: Negative. Hematological: Negative. Psychiatric/Behavioral: Negative. Past Medical History:   Diagnosis Date    Anxiety     Cancer ([address] 05/10/2017    DCIS left breast    Depression     Migraines     Neuropathy     Thyroid disease      Past Surgical History:   Procedure Laterality Date    BREAST BIOPSY Left 5/23/2017    INJECTION METHYLENE BLUE LEFT BREAST ULTRASOUND GUIDED LEFT BREAST LUMPECTOMY LEFT SENTINEL NODE BIOPSY, 90 MINS, EVICEL 2ML, PAT ON ADMIT  performed by Saint Spires, MD at [address]      t flap     BREAST SURGERY Left 6/27/13    exploration of left nipple with excisonal bx of ducts    BUNIONECTOMY      2006   [address], [address]    COLONOSCOPY      HYSTERECTOMY      HYSTERECTOMY, TOTAL ABDOMINAL      with BSO    MASTECTOMY Left     [place] COLON [place] SCRN NOT  [address] IND N/A 9/25/2018    COLONOSCOPY performed by Oma Alston MD at [address] [place] [address],[facility] History     Social History    Marital status:      Spouse name: N/A    Number of children: N/A    Years of education: N/A     Occupational History    Not on file.      Social History Main Topics    Smoking status: Current Every Day Smoker     Packs/day: 1.00     Years: 13.00     Types: Cigarettes    Smokeless tobacco: Never Used      Comment: start age 10    Alcohol use Yes      Comment: occasional     Drug use: No    Sexual activity: Yes     Other Topics Concern    Not on file     Social History Narrative    No narrative on file     Family History   Problem Relation Age of Onset    High Blood Pressure Mother     High Cholesterol Mother     Cancer Father         brain cancer    Stroke Father     Other Father         ulcerative colitis    High Blood Pressure Father     Cancer Sister         uterine cancer    High Blood Pressure Sister     High Blood Pressure Brother      Allergies   Allergen Reactions    Sulfa Antibiotics Hives     Current Outpatient Prescriptions on File Prior to Visit   Medication Sig Dispense Refill    SUMAtriptan (IMITREX) 100 MG tablet Take 1 tablet by mouth once as needed for Migraine 9 tablet 11    Calcium Carbonate Antacid (TUMS PO) Take by mouth      levothyroxine (SYNTHROID) 25 MCG tablet Take 1 tablet by mouth Daily 30 tablet 5    anastrozole (ARIMIDEX) 1 MG tablet TAKE 1 TABLET BY MOUTH EVERY DAY  6    LORazepam (ATIVAN) 2 MG tablet Take one tablet 3-4 times daily as needed not to exceed 105 per month.  Cholecalciferol (VITAMIN D) 2000 units TABS tablet Take 1 tablet by mouth daily 30 tablet     DULoxetine (CYMBALTA) 30 MG extended release capsule Take 30 mg by mouth      gabapentin (NEURONTIN) 300 MG capsule Take 300 mg by mouth 3 times daily. .       Current Facility-Administered Medications on File Prior to Visit   Medication Dose Route Frequency Provider Last Rate Last Dose    iopamidol (ISOVUE-370) 76 % injection 100 mL  100 mL Intravenous ONCE PRN Narendra Drew MD           Objective    Vitals:    09/28/18 1029   BP: 138/80   Pulse: 80   Resp: 16   Temp: 97.4 °F (36.3 °C)   TempSrc: Tympanic   SpO2: 98%   Weight: 166 lb (75.3 kg)   Height: 5' 7\" (1.702 m)     Physical Exam   Constitutional: She is oriented to person, place, and time. She appears well-developed and well-nourished. No distress. HENT:   Head: Normocephalic and atraumatic.    Cardiovascular:

## 2018-10-03 RX ADMIN — IOPAMIDOL 100 ML: 755 INJECTION, SOLUTION INTRAVENOUS at 10:42

## 2018-10-09 ENCOUNTER — HOSPITAL ENCOUNTER (EMERGENCY)
Age: 58
Discharge: HOME | End: 2018-10-09
Attending: EMERGENCY MEDICINE
Payer: COMMERCIAL

## 2018-10-09 ENCOUNTER — OFFICE VISIT (OUTPATIENT)
Dept: FAMILY MEDICINE CLINIC | Age: 58
End: 2018-10-09
Payer: COMMERCIAL

## 2018-10-09 VITALS
TEMPERATURE: 97.8 F | HEIGHT: 67 IN | HEART RATE: 85 BPM | DIASTOLIC BLOOD PRESSURE: 81 MMHG | RESPIRATION RATE: 18 BRPM | WEIGHT: 163 LBS | OXYGEN SATURATION: 100 % | BODY MASS INDEX: 25.58 KG/M2 | SYSTOLIC BLOOD PRESSURE: 135 MMHG

## 2018-10-09 VITALS
DIASTOLIC BLOOD PRESSURE: 80 MMHG | TEMPERATURE: 98.5 F | SYSTOLIC BLOOD PRESSURE: 132 MMHG | WEIGHT: 163.3 LBS | BODY MASS INDEX: 25.63 KG/M2 | HEART RATE: 83 BPM | OXYGEN SATURATION: 98 % | RESPIRATION RATE: 15 BRPM | HEIGHT: 67 IN

## 2018-10-09 DIAGNOSIS — R33.8 ACUTE URINARY RETENTION: Primary | ICD-10-CM

## 2018-10-09 DIAGNOSIS — Z98.890 HISTORY OF BREAST SURGERY: ICD-10-CM

## 2018-10-09 DIAGNOSIS — R33.9 URINARY RETENTION: Primary | ICD-10-CM

## 2018-10-09 DIAGNOSIS — Z98.890 S/P TRAM (TRANSVERSE RECTUS ABDOMINIS MUSCLE) FLAP BREAST RECONSTRUCTION: ICD-10-CM

## 2018-10-09 DIAGNOSIS — J01.10 ACUTE FRONTAL SINUSITIS, RECURRENCE NOT SPECIFIED: ICD-10-CM

## 2018-10-09 DIAGNOSIS — G43.809 OTHER MIGRAINE WITHOUT STATUS MIGRAINOSUS, NOT INTRACTABLE: ICD-10-CM

## 2018-10-09 LAB
ANION GAP SERPL CALCULATED.3IONS-SCNC: 12 MEQ/L (ref 7–13)
BILIRUBIN URINE: NEGATIVE
BLOOD, URINE: NEGATIVE
BUN BLDV-MCNC: 6 MG/DL (ref 8–23)
CALCIUM SERPL-MCNC: 9.4 MG/DL (ref 8.6–10.2)
CHLORIDE BLD-SCNC: 103 MEQ/L (ref 98–107)
CLARITY: CLEAR
CO2: 25 MEQ/L (ref 22–29)
COLOR: YELLOW
CREAT SERPL-MCNC: 0.58 MG/DL (ref 0.5–0.9)
EPITHELIAL CELLS, UA: NORMAL /HPF
GFR AFRICAN AMERICAN: >60
GFR NON-AFRICAN AMERICAN: >60
GLUCOSE BLD-MCNC: 93 MG/DL (ref 74–109)
GLUCOSE URINE: NEGATIVE MG/DL
HCT VFR BLD CALC: 41 % (ref 37–47)
HEMOGLOBIN: 13.9 G/DL (ref 12–16)
KETONES, URINE: NEGATIVE MG/DL
LEUKOCYTE ESTERASE, URINE: ABNORMAL
MCH RBC QN AUTO: 30.2 PG (ref 27–31.3)
MCHC RBC AUTO-ENTMCNC: 33.9 % (ref 33–37)
MCV RBC AUTO: 89.1 FL (ref 82–100)
NITRITE, URINE: NEGATIVE
PDW BLD-RTO: 15.3 % (ref 11.5–14.5)
PH UA: 6.5 (ref 5–9)
PLATELET # BLD: 243 K/UL (ref 130–400)
POTASSIUM SERPL-SCNC: 4.9 MEQ/L (ref 3.5–5.1)
PROTEIN UA: NEGATIVE MG/DL
RBC # BLD: 4.6 M/UL (ref 4.2–5.4)
RBC UA: NORMAL /HPF (ref 0–2)
RENAL EPITHELIAL, UA: NORMAL /HPF
SODIUM BLD-SCNC: 140 MEQ/L (ref 132–144)
SPECIFIC GRAVITY UA: 1 (ref 1–1.03)
URINE REFLEX TO CULTURE: YES
UROBILINOGEN, URINE: 0.2 E.U./DL
WBC # BLD: 5.2 K/UL (ref 4.8–10.8)
WBC UA: NORMAL /HPF (ref 0–5)

## 2018-10-09 PROCEDURE — 99283 EMERGENCY DEPT VISIT LOW MDM: CPT

## 2018-10-09 PROCEDURE — 36415 COLL VENOUS BLD VENIPUNCTURE: CPT

## 2018-10-09 PROCEDURE — G8417 CALC BMI ABV UP PARAM F/U: HCPCS | Performed by: INTERNAL MEDICINE

## 2018-10-09 PROCEDURE — 85027 COMPLETE CBC AUTOMATED: CPT

## 2018-10-09 PROCEDURE — 2580000003 HC RX 258: Performed by: EMERGENCY MEDICINE

## 2018-10-09 PROCEDURE — 51702 INSERT TEMP BLADDER CATH: CPT

## 2018-10-09 PROCEDURE — 81001 URINALYSIS AUTO W/SCOPE: CPT

## 2018-10-09 PROCEDURE — 80048 BASIC METABOLIC PNL TOTAL CA: CPT

## 2018-10-09 PROCEDURE — G8484 FLU IMMUNIZE NO ADMIN: HCPCS | Performed by: INTERNAL MEDICINE

## 2018-10-09 PROCEDURE — 81002 URINALYSIS NONAUTO W/O SCOPE: CPT | Performed by: INTERNAL MEDICINE

## 2018-10-09 PROCEDURE — G8427 DOCREV CUR MEDS BY ELIG CLIN: HCPCS | Performed by: INTERNAL MEDICINE

## 2018-10-09 PROCEDURE — 99215 OFFICE O/P EST HI 40 MIN: CPT | Performed by: INTERNAL MEDICINE

## 2018-10-09 PROCEDURE — 3017F COLORECTAL CA SCREEN DOC REV: CPT | Performed by: INTERNAL MEDICINE

## 2018-10-09 PROCEDURE — 51798 US URINE CAPACITY MEASURE: CPT | Performed by: INTERNAL MEDICINE

## 2018-10-09 PROCEDURE — 87086 URINE CULTURE/COLONY COUNT: CPT

## 2018-10-09 PROCEDURE — 4004F PT TOBACCO SCREEN RCVD TLK: CPT | Performed by: INTERNAL MEDICINE

## 2018-10-09 RX ORDER — SUMATRIPTAN 100 MG/1
100 TABLET, FILM COATED ORAL
Qty: 9 TABLET | Refills: 0 | Status: SHIPPED | OUTPATIENT
Start: 2018-10-09 | End: 2018-11-02 | Stop reason: SDUPTHER

## 2018-10-09 RX ORDER — AMOXICILLIN AND CLAVULANATE POTASSIUM 875; 125 MG/1; MG/1
1 TABLET, FILM COATED ORAL 2 TIMES DAILY
Qty: 14 TABLET | Refills: 0 | Status: SHIPPED | OUTPATIENT
Start: 2018-10-09 | End: 2018-10-16

## 2018-10-09 RX ORDER — 0.9 % SODIUM CHLORIDE 0.9 %
500 INTRAVENOUS SOLUTION INTRAVENOUS ONCE
Status: COMPLETED | OUTPATIENT
Start: 2018-10-09 | End: 2018-10-09

## 2018-10-09 RX ADMIN — SODIUM CHLORIDE 500 ML: 9 INJECTION, SOLUTION INTRAVENOUS at 13:11

## 2018-10-09 ASSESSMENT — PAIN DESCRIPTION - PAIN TYPE: TYPE: ACUTE PAIN

## 2018-10-09 ASSESSMENT — PAIN DESCRIPTION - DESCRIPTORS: DESCRIPTORS: PRESSURE

## 2018-10-09 ASSESSMENT — ENCOUNTER SYMPTOMS
BACK PAIN: 0
COUGH: 0
VOMITING: 0
ABDOMINAL PAIN: 0
SHORTNESS OF BREATH: 0
EYE PAIN: 0
SHORTNESS OF BREATH: 0

## 2018-10-09 ASSESSMENT — PAIN SCALES - GENERAL: PAINLEVEL_OUTOF10: 3

## 2018-10-09 NOTE — ED PROVIDER NOTES
[facility] ED  eMERGENCY dEPARTMENT eNCOUnter      Pt Name: Jaime Santamaria  MRN: [id number]  [place] 1960  Date of evaluation: 10/9/2018  Provider: Isis Song DO    CHIEF COMPLAINT       Chief Complaint   Patient presents with    Urinary Retention     pt is not emptying bladder, was at  and they told her to come to ER         HISTORY OF PRESENT ILLNESS   (Location/Symptom, Timing/Onset, Context/Setting, Quality, Duration, Modifying Factors, Severity)  Note limiting factors. Jaime Santamaria is a 59 y.o. female who presents to the emergency department For the evaluation of decreased urinary ability. She called her primary care physician today, they were trying to get her in to see urology by urology could not see her today so they told her to come to the ER. Apparently she said she has only had about 8-16 ounces of urine come out over the past 36 hours despite drinking 9 bottles of water. She has no abdominal pain but feels a little bit bloated. No nausea, no vomiting and no fever. No history of urinary tract infection. She has some concern that this could be related to a TRAM flap that had been done in the past.     Nursing Notes were reviewed. REVIEW OF SYSTEMS    (2-9 systems for level 4, 10 or more for level 5)     Review of Systems   Constitutional: Negative for fever. Respiratory: Negative for cough and shortness of breath. Cardiovascular: Negative for leg swelling. Gastrointestinal: Negative for vomiting. Genitourinary: Positive for difficulty urinating. Negative for hematuria. Musculoskeletal: Negative for joint swelling. Skin: Negative for rash. Neurological: Negative for weakness. All other systems reviewed and are negative. Except as noted above the remainder of the review of systems was reviewed and negative.        PAST MEDICAL HISTORY     Past Medical History:   Diagnosis Date    Anxiety     Cancer [facility]) 05/10/2017    DCIS left breast    Depression  Migraines     Neuropathy     Thyroid disease          SURGICAL HISTORY       Past Surgical History:   Procedure Laterality Date    BREAST BIOPSY Left 5/23/2017    INJECTION METHYLENE BLUE LEFT BREAST ULTRASOUND GUIDED LEFT BREAST LUMPECTOMY LEFT SENTINEL NODE BIOPSY, 90 MINS, EVICEL 2ML, PAT ON ADMIT  performed by Vincent Mckeon MD at [address]      t flap     BREAST SURGERY Left 6/27/13    exploration of left nipple with excisonal bx of ducts    BUNIONECTOMY      2006   [address], [address]    COLONOSCOPY      HYSTERECTOMY      HYSTERECTOMY, TOTAL ABDOMINAL      with BSO    MASTECTOMY Left     [place] COLON CA SCRN NOT  [address] IND N/A 9/25/2018    COLONOSCOPY performed by Tommy Vanegas MD at [address]       Previous Medications    AMOXICILLIN-CLAVULANATE (AUGMENTIN) 875-125 MG PER TABLET    Take 1 tablet by mouth 2 times daily for 7 days    ANASTROZOLE (ARIMIDEX) 1 MG TABLET    TAKE 1 TABLET BY MOUTH EVERY DAY    CALCIUM CARBONATE ANTACID (TUMS PO)    Take by mouth    CHOLECALCIFEROL (VITAMIN D) 2000 UNITS TABS TABLET    Take 1 tablet by mouth daily    DULOXETINE (CYMBALTA) 30 MG EXTENDED RELEASE CAPSULE    Take 30 mg by mouth    GABAPENTIN (NEURONTIN) 300 MG CAPSULE    Take 300 mg by mouth 3 times daily. Vannie Olson LEVOTHYROXINE (SYNTHROID) 25 MCG TABLET    Take 1 tablet by mouth Daily    LORAZEPAM (ATIVAN) 2 MG TABLET    Take one tablet 3-4 times daily as needed not to exceed 105 per month.     SUMATRIPTAN (IMITREX) 100 MG TABLET    Take 1 tablet by mouth once as needed for Migraine       ALLERGIES     Sulfa antibiotics    FAMILY HISTORY       Family History   Problem Relation Age of Onset    High Blood Pressure Mother     High Cholesterol Mother     Cancer Father         brain cancer    Stroke Father     Other Father         ulcerative colitis    High Blood Pressure Father     Cancer Sister         uterine cancer    High Blood Pressure Sister     High Blood Pressure Brother           SOCIAL HISTORY       Social History     Social History    Marital status:      Spouse name: N/A    Number of children: N/A    Years of education: N/A     Social History Main Topics    Smoking status: Current Every Day Smoker     Packs/day: 1.00     Years: 13.00     Types: Cigarettes    Smokeless tobacco: Never Used      Comment: start age 10    Alcohol use Yes      Comment: occasional     Drug use: No    Sexual activity: Yes     Other Topics Concern    None     Social History Narrative    None       SCREENINGS             PHYSICAL EXAM    (up to 7 for level 4, 8 or more for level 5)     ED Triage Vitals [10/09/18 1211]   BP Temp Temp Source Pulse Resp SpO2 Height Weight   133/84 97.8 °F (36.6 °C) Oral 85 18 98 % 5' 7\" (1.702 m) 163 lb (73.9 kg)       Physical Exam   Constitutional: She appears well-developed and well-nourished. No distress. HENT:   Head: Normocephalic and atraumatic. Mouth/Throat: Oropharynx is clear and moist.   Eyes: Conjunctivae are normal.   Pupils are equally round and reacting normally. Extraoccular muscles are grossly intact. Neck: Normal range of motion. No tracheal deviation present. Cardiovascular: Normal rate, regular rhythm, normal heart sounds and intact distal pulses. Exam reveals no friction rub. No murmur heard. Pulmonary/Chest: Effort normal and breath sounds normal. No stridor. No respiratory distress. She has no wheezes. She has no rales. She exhibits no tenderness. Abdominal: Soft. She exhibits no distension and no mass. There is no tenderness. There is no rebound and no guarding. Musculoskeletal: Normal range of motion. She exhibits no edema or deformity. Neurological: She is alert. Answering questions appropriately. No gaze deficit. No gait abnormality. Moving all extremities. Skin: Skin is warm and dry. Psychiatric: She has a normal mood and affect.

## 2018-10-10 ENCOUNTER — TELEPHONE (OUTPATIENT)
Dept: FAMILY MEDICINE CLINIC | Age: 58
End: 2018-10-10

## 2018-10-10 DIAGNOSIS — Z98.890 HISTORY OF BREAST SURGERY: ICD-10-CM

## 2018-10-10 DIAGNOSIS — Z85.3 HISTORY OF BREAST CANCER: Primary | ICD-10-CM

## 2018-10-10 DIAGNOSIS — Z98.890 S/P TRAM (TRANSVERSE RECTUS ABDOMINIS MUSCLE) FLAP BREAST RECONSTRUCTION: ICD-10-CM

## 2018-10-10 DIAGNOSIS — K76.9 HEPATIC LESION: Primary | ICD-10-CM

## 2018-10-10 NOTE — TELEPHONE ENCOUNTER
Per Melchor Sanabria @ Dr. Lm Alexander office - They received a referral for Pt, but Dr. Helder Montilla does not see patients for breast issues. Melchor Sanabria recommends Pt be referred to Dr. Vita Sandifer or Dr. Duff.

## 2018-10-11 ENCOUNTER — HOSPITAL ENCOUNTER (OUTPATIENT)
Dept: MRI IMAGING | Age: 58
Discharge: HOME | End: 2018-10-13
Payer: COMMERCIAL

## 2018-10-11 DIAGNOSIS — K76.9 HEPATIC LESION: ICD-10-CM

## 2018-10-11 DIAGNOSIS — Z85.3 HISTORY OF BREAST CANCER: ICD-10-CM

## 2018-10-11 LAB — URINE CULTURE, ROUTINE: NORMAL

## 2018-10-11 PROCEDURE — A9577 INJ MULTIHANCE: HCPCS | Performed by: FAMILY MEDICINE

## 2018-10-11 PROCEDURE — 6360000004 HC RX CONTRAST MEDICATION: Performed by: FAMILY MEDICINE

## 2018-10-11 PROCEDURE — 74183 MRI ABD W/O CNTR FLWD CNTR: CPT

## 2018-10-11 RX ORDER — 0.9 % SODIUM CHLORIDE 0.9 %
40 VIAL (ML) INJECTION ONCE
Status: DISCONTINUED | OUTPATIENT
Start: 2018-10-11 | End: 2018-10-14 | Stop reason: HOSPADM

## 2018-10-11 RX ADMIN — GADOBENATE DIMEGLUMINE 20 ML: 529 INJECTION, SOLUTION INTRAVENOUS at 10:26

## 2018-10-25 ENCOUNTER — APPOINTMENT (OUTPATIENT)
Dept: GENERAL RADIOLOGY | Age: 58
End: 2018-10-25
Payer: COMMERCIAL

## 2018-10-25 ENCOUNTER — HOSPITAL ENCOUNTER (EMERGENCY)
Age: 58
Discharge: HOME | End: 2018-10-25
Attending: EMERGENCY MEDICINE
Payer: COMMERCIAL

## 2018-10-25 VITALS
OXYGEN SATURATION: 98 % | RESPIRATION RATE: 18 BRPM | BODY MASS INDEX: 25.74 KG/M2 | SYSTOLIC BLOOD PRESSURE: 145 MMHG | WEIGHT: 164 LBS | TEMPERATURE: 98.3 F | DIASTOLIC BLOOD PRESSURE: 74 MMHG | HEART RATE: 79 BPM | HEIGHT: 67 IN

## 2018-10-25 DIAGNOSIS — J40 BRONCHITIS: Primary | ICD-10-CM

## 2018-10-25 LAB
ALBUMIN SERPL-MCNC: 4.3 G/DL (ref 3.9–4.9)
ALP BLD-CCNC: 52 U/L (ref 40–130)
ALT SERPL-CCNC: 17 U/L (ref 0–33)
ANION GAP SERPL CALCULATED.3IONS-SCNC: 16 MEQ/L (ref 7–13)
AST SERPL-CCNC: 22 U/L (ref 0–35)
BASOPHILS ABSOLUTE: 0.1 K/UL (ref 0–0.2)
BASOPHILS RELATIVE PERCENT: 1.1 %
BILIRUB SERPL-MCNC: 0.2 MG/DL (ref 0–1.2)
BUN BLDV-MCNC: 8 MG/DL (ref 8–23)
CALCIUM SERPL-MCNC: 8.8 MG/DL (ref 8.6–10.2)
CHLORIDE BLD-SCNC: 101 MEQ/L (ref 98–107)
CO2: 21 MEQ/L (ref 22–29)
CREAT SERPL-MCNC: 0.64 MG/DL (ref 0.5–0.9)
EOSINOPHILS ABSOLUTE: 0.1 K/UL (ref 0–0.7)
EOSINOPHILS RELATIVE PERCENT: 1.5 %
GFR AFRICAN AMERICAN: >60
GFR NON-AFRICAN AMERICAN: >60
GLOBULIN: 2.2 G/DL (ref 2.3–3.5)
GLUCOSE BLD-MCNC: 97 MG/DL (ref 74–109)
HCT VFR BLD CALC: 38.9 % (ref 37–47)
HEMOGLOBIN: 13.5 G/DL (ref 12–16)
LYMPHOCYTES ABSOLUTE: 1.6 K/UL (ref 1–4.8)
LYMPHOCYTES RELATIVE PERCENT: 22.6 %
MCH RBC QN AUTO: 30.6 PG (ref 27–31.3)
MCHC RBC AUTO-ENTMCNC: 34.6 % (ref 33–37)
MCV RBC AUTO: 88.4 FL (ref 82–100)
MONOCYTES ABSOLUTE: 0.3 K/UL (ref 0.2–0.8)
MONOCYTES RELATIVE PERCENT: 3.7 %
NEUTROPHILS ABSOLUTE: 5.1 K/UL (ref 1.4–6.5)
NEUTROPHILS RELATIVE PERCENT: 71.1 %
PDW BLD-RTO: 14.8 % (ref 11.5–14.5)
PLATELET # BLD: 186 K/UL (ref 130–400)
POTASSIUM SERPL-SCNC: 5 MEQ/L (ref 3.5–5.1)
PRO-BNP: 100 PG/ML
RBC # BLD: 4.41 M/UL (ref 4.2–5.4)
SODIUM BLD-SCNC: 138 MEQ/L (ref 132–144)
TOTAL PROTEIN: 6.5 G/DL (ref 6.4–8.1)
TROPONIN: <0.01 NG/ML (ref 0–0.01)
WBC # BLD: 7.1 K/UL (ref 4.8–10.8)

## 2018-10-25 PROCEDURE — 36415 COLL VENOUS BLD VENIPUNCTURE: CPT

## 2018-10-25 PROCEDURE — 80053 COMPREHEN METABOLIC PANEL: CPT

## 2018-10-25 PROCEDURE — 99285 EMERGENCY DEPT VISIT HI MDM: CPT

## 2018-10-25 PROCEDURE — 2580000003 HC RX 258: Performed by: EMERGENCY MEDICINE

## 2018-10-25 PROCEDURE — 84484 ASSAY OF TROPONIN QUANT: CPT

## 2018-10-25 PROCEDURE — 85025 COMPLETE CBC W/AUTO DIFF WBC: CPT

## 2018-10-25 PROCEDURE — 71045 X-RAY EXAM CHEST 1 VIEW: CPT

## 2018-10-25 PROCEDURE — 83880 ASSAY OF NATRIURETIC PEPTIDE: CPT

## 2018-10-25 PROCEDURE — 6370000000 HC RX 637 (ALT 250 FOR IP): Performed by: EMERGENCY MEDICINE

## 2018-10-25 RX ORDER — 0.9 % SODIUM CHLORIDE 0.9 %
1000 INTRAVENOUS SOLUTION INTRAVENOUS ONCE
Status: COMPLETED | OUTPATIENT
Start: 2018-10-25 | End: 2018-10-25

## 2018-10-25 RX ORDER — CODEINE PHOSPHATE AND GUAIFENESIN 10; 100 MG/5ML; MG/5ML
5 SOLUTION ORAL ONCE
Status: COMPLETED | OUTPATIENT
Start: 2018-10-25 | End: 2018-10-25

## 2018-10-25 RX ORDER — GUAIFENESIN AND CODEINE PHOSPHATE 100; 10 MG/5ML; MG/5ML
5 SOLUTION ORAL 4 TIMES DAILY PRN
Qty: 140 ML | Refills: 0 | Status: SHIPPED | OUTPATIENT
Start: 2018-10-25 | End: 2018-11-01

## 2018-10-25 RX ORDER — AMOXICILLIN 500 MG/1
500 CAPSULE ORAL 3 TIMES DAILY
Qty: 21 CAPSULE | Refills: 0 | Status: ON HOLD | OUTPATIENT
Start: 2018-10-25 | End: 2021-07-29 | Stop reason: HOSPADM

## 2018-10-25 RX ADMIN — GUAIFENESIN AND CODEINE PHOSPHATE 5 ML: 100; 10 SOLUTION ORAL at 12:25

## 2018-10-25 RX ADMIN — SODIUM CHLORIDE 1000 ML: 9 INJECTION, SOLUTION INTRAVENOUS at 12:16

## 2018-10-25 ASSESSMENT — PAIN DESCRIPTION - DESCRIPTORS: DESCRIPTORS: ACHING

## 2018-10-25 ASSESSMENT — PAIN DESCRIPTION - LOCATION: LOCATION: HEAD

## 2018-10-25 ASSESSMENT — PAIN SCALES - GENERAL: PAINLEVEL_OUTOF10: 4

## 2018-10-25 NOTE — ED PROVIDER NOTES
[facility] OR    BREAST RECONSTRUCTION      t flap     BREAST SURGERY Left 13    exploration of left nipple with excisonal bx of ducts    BUNIONECTOMY      2006   [address],     COLONOSCOPY      HYSTERECTOMY      HYSTERECTOMY, TOTAL ABDOMINAL      with BSO    MASTECTOMY Left     [place] COLON CA SCRN NOT  [address] IND N/A 2018    COLONOSCOPY performed by Joshua Diez MD at [address] [place] [address]     None otherwise stated in nurses notes    CURRENT MEDICATIONS       Discharge Medication List as of 10/25/2018 12:53 PM      CONTINUE these medications which have NOT CHANGED    Details   SUMAtriptan (IMITREX) 100 MG tablet Take 1 tablet by mouth once as needed for Migraine, Disp-9 tablet, R-0Normal      Calcium Carbonate Antacid (TUMS PO) Take by mouthHistorical Med      levothyroxine (SYNTHROID) 25 MCG tablet Take 1 tablet by mouth Daily, Disp-30 tablet, R-5Normal      anastrozole (ARIMIDEX) 1 MG tablet TAKE 1 TABLET BY MOUTH EVERY DAY, R-6Historical Med      LORazepam (ATIVAN) 2 MG tablet Take one tablet 3-4 times daily as needed not to exceed 105 per month. Historical Med      Cholecalciferol (VITAMIN D) 2000 units TABS tablet Take 1 tablet by mouth daily, Disp-30 tabletOTC      DULoxetine (CYMBALTA) 30 MG extended release capsule Take 30 mg by mouthHistorical Med      gabapentin (NEURONTIN) 300 MG capsule Take 300 mg by mouth 3 times daily. Iman Henry Historical Med             ALLERGIES     Sulfa antibiotics    FAMILY HISTORY       Family History   Problem Relation Age of Onset    High Blood Pressure Mother     High Cholesterol Mother     Cancer Father         brain cancer    Stroke Father     Other Father         ulcerative colitis    High Blood Pressure Father     Cancer Sister         uterine cancer    High Blood Pressure Sister     High Blood Pressure Brother      Family Status   Relation Status    Mother     Father     Sister     Brother congestion        LABS:  Labs Reviewed   CBC WITH AUTO DIFFERENTIAL - Abnormal; Notable for the following:        Result Value    RDW 14.8 (*)     All other components within normal limits   COMPREHENSIVE METABOLIC PANEL - Abnormal; Notable for the following:     CO2 21 (*)     Anion Gap 16 (*)     Globulin 2.2 (*)     All other components within normal limits   BRAIN NATRIURETIC PEPTIDE   TROPONIN       All other labs were within normal range or not returned as of this dictation. EMERGENCY DEPARTMENT COURSE and DIFFERENTIAL DIAGNOSIS/MDM:   Vitals:    Vitals:    10/25/18 1148   BP: (!) 145/74   Pulse: 79   Resp: 18   Temp: 98.3 °F (36.8 °C)   TempSrc: Oral   SpO2: 98%   Weight: 164 lb (74.4 kg)   Height: 5' 7\" (1.702 m)       Patient feeling much better after a dose of some cough syrup, her laboratory workup was reviewed, as well as her imaging. She feels much better. We'll discharge her with a prescription for antibiotic, cough syrup, encouraged close follow-up with her family doctor, return to ER if she develops any acute worsening of symptoms or any other concerns right away. Pt is resting comfortably, no difficulty breathing, no distress. Instructed to return to the emergency room if symptoms worsen, return, or any other concern right away which is agreed by the patient. Instructed to f/u with PCP in 2-3 days for re-evaluation. ED MEDS:  Orders Placed This Encounter   Medications    0.9 % sodium chloride bolus    guaiFENesin-codeine (GUAIFENESIN AC) 100-10 MG/5ML liquid 5 mL    guaiFENesin-codeine (TUSSI-ORGANIDIN NR) 100-10 MG/5ML syrup     Sig: Take 5 mLs by mouth 4 times daily as needed for Cough for up to 7 days. .     Dispense:  140 mL     Refill:  0    amoxicillin (AMOXIL) 500 MG capsule     Sig: Take 1 capsule by mouth 3 times daily     Dispense:  21 capsule     Refill:  0         CONSULTS:  None    PROCEDURES:  None      FINAL IMPRESSION      1.  Bronchitis          DISPOSITION/PLAN DISPOSITION Decision To Discharge    PATIENT REFERRED TO:  Bonita Lehman, DO  [address] [phone]    Call in 3 days        DISCHARGE MEDICATIONS:  Discharge Medication List as of 10/25/2018 12:53 PM      START taking these medications    Details   guaiFENesin-codeine (TUSSI-ORGANIDIN NR) 100-10 MG/5ML syrup Take 5 mLs by mouth 4 times daily as needed for Cough for up to 7 days. ., Disp-140 mL, R-0Print      amoxicillin (AMOXIL) 500 MG capsule Take 1 capsule by mouth 3 times daily, Disp-21 capsule, R-0Print             (Please note that portions of this note were completed with a voice recognition program.  Efforts were made to edit the dictations but occasionally words are mis-transcribed.)    Cornel[address] [place] Kessler DO  10/25/18 5580

## 2018-11-02 DIAGNOSIS — G43.809 OTHER MIGRAINE WITHOUT STATUS MIGRAINOSUS, NOT INTRACTABLE: ICD-10-CM

## 2018-11-02 RX ORDER — SUMATRIPTAN 100 MG/1
TABLET, FILM COATED ORAL
Qty: 9 TABLET | Refills: 3 | Status: ON HOLD | OUTPATIENT
Start: 2018-11-02 | End: 2021-09-28 | Stop reason: HOSPADM

## 2018-11-05 DIAGNOSIS — E03.4 HYPOTHYROIDISM DUE TO ACQUIRED ATROPHY OF THYROID: ICD-10-CM

## 2018-11-05 RX ORDER — LEVOTHYROXINE SODIUM 0.03 MG/1
25 TABLET ORAL DAILY
Qty: 30 TABLET | Refills: 5 | Status: ON HOLD | OUTPATIENT
Start: 2018-11-05 | End: 2021-09-28 | Stop reason: HOSPADM

## 2021-04-27 LAB — MAMMOGRAPHY, EXTERNAL: NORMAL

## 2021-07-19 ENCOUNTER — HOSPITAL ENCOUNTER (INPATIENT)
Age: 61
LOS: 9 days | Discharge: HOME | DRG: 885 | End: 2021-07-29
Attending: PSYCHIATRY & NEUROLOGY | Admitting: PSYCHIATRY & NEUROLOGY
Payer: COMMERCIAL

## 2021-07-19 DIAGNOSIS — F31.9 BIPOLAR 1 DISORDER (HCC): Primary | ICD-10-CM

## 2021-07-19 LAB
ACETAMINOPHEN LEVEL: <5 UG/ML (ref 10–30)
ALBUMIN SERPL-MCNC: 4.4 G/DL (ref 3.5–4.6)
ALP BLD-CCNC: 54 U/L (ref 40–130)
ALT SERPL-CCNC: 18 U/L (ref 0–33)
AMPHETAMINE SCREEN, URINE: NORMAL
ANION GAP SERPL CALCULATED.3IONS-SCNC: 9 MEQ/L (ref 9–15)
AST SERPL-CCNC: 23 U/L (ref 0–35)
BACTERIA: NEGATIVE /HPF
BARBITURATE SCREEN URINE: NORMAL
BASOPHILS ABSOLUTE: 0.1 K/UL (ref 0–0.2)
BASOPHILS RELATIVE PERCENT: 0.9 %
BENZODIAZEPINE SCREEN, URINE: NORMAL
BILIRUB SERPL-MCNC: <0.2 MG/DL (ref 0.2–0.7)
BILIRUBIN URINE: NEGATIVE
BLOOD, URINE: NEGATIVE
BUN BLDV-MCNC: 9 MG/DL (ref 8–23)
CALCIUM SERPL-MCNC: 9.3 MG/DL (ref 8.5–9.9)
CANNABINOID SCREEN URINE: NORMAL
CHLORIDE BLD-SCNC: 101 MEQ/L (ref 95–107)
CHOLESTEROL, TOTAL: 162 MG/DL (ref 0–199)
CK MB: 4.8 NG/ML (ref 0–3.8)
CLARITY: CLEAR
CO2: 26 MEQ/L (ref 20–31)
COCAINE METABOLITE SCREEN URINE: NORMAL
COLOR: YELLOW
CREAT SERPL-MCNC: 0.82 MG/DL (ref 0.5–0.9)
CREATINE KINASE-MB INDEX: 2.1 % (ref 0–3.5)
EOSINOPHILS ABSOLUTE: 0.2 K/UL (ref 0–0.7)
EOSINOPHILS RELATIVE PERCENT: 2.7 %
EPITHELIAL CELLS, UA: ABNORMAL /HPF (ref 0–5)
ETHANOL PERCENT: NORMAL G/DL
ETHANOL: <10 MG/DL (ref 0–0.08)
GFR AFRICAN AMERICAN: >60
GFR NON-AFRICAN AMERICAN: >60
GLOBULIN: 2.2 G/DL (ref 2.3–3.5)
GLUCOSE BLD-MCNC: 95 MG/DL (ref 70–99)
GLUCOSE URINE: NEGATIVE MG/DL
HCT VFR BLD CALC: 37.2 % (ref 37–47)
HDLC SERPL-MCNC: 63 MG/DL (ref 40–59)
HEMOGLOBIN: 12.7 G/DL (ref 12–16)
HYALINE CASTS: ABNORMAL /HPF (ref 0–5)
KETONES, URINE: NEGATIVE MG/DL
LDL CHOLESTEROL CALCULATED: 74 MG/DL (ref 0–129)
LEUKOCYTE ESTERASE, URINE: ABNORMAL
LYMPHOCYTES ABSOLUTE: 1.7 K/UL (ref 1–4.8)
LYMPHOCYTES RELATIVE PERCENT: 30.2 %
Lab: NORMAL
MCH RBC QN AUTO: 31.6 PG (ref 27–31.3)
MCHC RBC AUTO-ENTMCNC: 34.1 % (ref 33–37)
MCV RBC AUTO: 92.7 FL (ref 82–100)
METHADONE SCREEN, URINE: NORMAL
MONOCYTES ABSOLUTE: 0.4 K/UL (ref 0.2–0.8)
MONOCYTES RELATIVE PERCENT: 7.7 %
NEUTROPHILS ABSOLUTE: 3.3 K/UL (ref 1.4–6.5)
NEUTROPHILS RELATIVE PERCENT: 58.5 %
NITRITE, URINE: NEGATIVE
OPIATE SCREEN URINE: NORMAL
OXYCODONE URINE: NORMAL
PDW BLD-RTO: 13.4 % (ref 11.5–14.5)
PH UA: 6.5 (ref 5–9)
PHENCYCLIDINE SCREEN URINE: NORMAL
PLATELET # BLD: 255 K/UL (ref 130–400)
POTASSIUM SERPL-SCNC: 3.7 MEQ/L (ref 3.4–4.9)
PROPOXYPHENE SCREEN: NORMAL
PROTEIN UA: NEGATIVE MG/DL
RBC # BLD: 4.01 M/UL (ref 4.2–5.4)
RBC UA: ABNORMAL /HPF (ref 0–2)
RENAL EPITHELIAL, UA: ABNORMAL /HPF
SALICYLATE, SERUM: <0.3 MG/DL (ref 15–30)
SARS-COV-2, NAAT: NOT DETECTED
SODIUM BLD-SCNC: 136 MEQ/L (ref 135–144)
SPECIFIC GRAVITY UA: 1.01 (ref 1–1.03)
TOTAL CK: 232 U/L (ref 0–170)
TOTAL PROTEIN: 6.6 G/DL (ref 6.3–8)
TRIGL SERPL-MCNC: 124 MG/DL (ref 0–150)
TSH SERPL DL<=0.05 MIU/L-ACNC: 2.06 UIU/ML (ref 0.44–3.86)
URINE REFLEX TO CULTURE: YES
UROBILINOGEN, URINE: 0.2 E.U./DL
WBC # BLD: 5.7 K/UL (ref 4.8–10.8)
WBC UA: ABNORMAL /HPF (ref 0–5)

## 2021-07-19 PROCEDURE — 85025 COMPLETE CBC W/AUTO DIFF WBC: CPT

## 2021-07-19 PROCEDURE — 84443 ASSAY THYROID STIM HORMONE: CPT

## 2021-07-19 PROCEDURE — 82550 ASSAY OF CK (CPK): CPT

## 2021-07-19 PROCEDURE — 87086 URINE CULTURE/COLONY COUNT: CPT

## 2021-07-19 PROCEDURE — 81001 URINALYSIS AUTO W/SCOPE: CPT

## 2021-07-19 PROCEDURE — 80143 DRUG ASSAY ACETAMINOPHEN: CPT

## 2021-07-19 PROCEDURE — 80061 LIPID PANEL: CPT

## 2021-07-19 PROCEDURE — 87635 SARS-COV-2 COVID-19 AMP PRB: CPT

## 2021-07-19 PROCEDURE — 80307 DRUG TEST PRSMV CHEM ANLYZR: CPT

## 2021-07-19 PROCEDURE — 36415 COLL VENOUS BLD VENIPUNCTURE: CPT

## 2021-07-19 PROCEDURE — 82553 CREATINE MB FRACTION: CPT

## 2021-07-19 PROCEDURE — 6370000000 HC RX 637 (ALT 250 FOR IP): Performed by: STUDENT IN AN ORGANIZED HEALTH CARE EDUCATION/TRAINING PROGRAM

## 2021-07-19 PROCEDURE — 93005 ELECTROCARDIOGRAM TRACING: CPT | Performed by: PSYCHIATRY & NEUROLOGY

## 2021-07-19 PROCEDURE — 80053 COMPREHEN METABOLIC PANEL: CPT

## 2021-07-19 PROCEDURE — 80179 DRUG ASSAY SALICYLATE: CPT

## 2021-07-19 PROCEDURE — 82077 ASSAY SPEC XCP UR&BREATH IA: CPT

## 2021-07-19 RX ORDER — LORAZEPAM 1 MG/1
2 TABLET ORAL ONCE
Status: COMPLETED | OUTPATIENT
Start: 2021-07-19 | End: 2021-07-19

## 2021-07-19 RX ORDER — GABAPENTIN 100 MG/1
300 CAPSULE ORAL ONCE
Status: COMPLETED | OUTPATIENT
Start: 2021-07-19 | End: 2021-07-19

## 2021-07-19 RX ORDER — IBUPROFEN 600 MG/1
600 TABLET ORAL ONCE
Status: COMPLETED | OUTPATIENT
Start: 2021-07-19 | End: 2021-07-19

## 2021-07-19 RX ADMIN — IBUPROFEN 600 MG: 600 TABLET, FILM COATED ORAL at 23:26

## 2021-07-19 RX ADMIN — GABAPENTIN 300 MG: 100 CAPSULE ORAL at 23:26

## 2021-07-19 RX ADMIN — LORAZEPAM 2 MG: 1 TABLET ORAL at 23:26

## 2021-07-19 ASSESSMENT — ENCOUNTER SYMPTOMS
SHORTNESS OF BREATH: 0
NAUSEA: 0
ABDOMINAL PAIN: 0
VOMITING: 0
PHOTOPHOBIA: 0
WHEEZING: 0
COUGH: 0

## 2021-07-19 ASSESSMENT — PAIN SCALES - GENERAL: PAINLEVEL_OUTOF10: 3
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
## 2021-07-19 NOTE — ED NOTES
Bed: 11  Expected date:   Expected time:   Means of arrival:   Comments:  61f pink slipped by Michael Beal RN  07/19/21 1936

## 2021-07-20 PROBLEM — F31.9 BIPOLAR 1 DISORDER (HCC): Status: ACTIVE | Noted: 2021-07-20

## 2021-07-20 LAB
EKG ATRIAL RATE: 77 BPM
EKG P AXIS: 68 DEGREES
EKG P-R INTERVAL: 164 MS
EKG Q-T INTERVAL: 386 MS
EKG QRS DURATION: 78 MS
EKG QTC CALCULATION (BAZETT): 436 MS
EKG R AXIS: 6 DEGREES
EKG T AXIS: 8 DEGREES
EKG VENTRICULAR RATE: 77 BPM

## 2021-07-20 PROCEDURE — 99285 EMERGENCY DEPT VISIT HI MDM: CPT

## 2021-07-20 PROCEDURE — 99222 1ST HOSP IP/OBS MODERATE 55: CPT | Performed by: PSYCHIATRY & NEUROLOGY

## 2021-07-20 PROCEDURE — 6370000000 HC RX 637 (ALT 250 FOR IP): Performed by: PSYCHIATRY & NEUROLOGY

## 2021-07-20 PROCEDURE — 93010 ELECTROCARDIOGRAM REPORT: CPT | Performed by: INTERNAL MEDICINE

## 2021-07-20 PROCEDURE — 6370000000 HC RX 637 (ALT 250 FOR IP): Performed by: PHYSICIAN ASSISTANT

## 2021-07-20 PROCEDURE — 1240000000 HC EMOTIONAL WELLNESS R&B

## 2021-07-20 RX ORDER — LEVOTHYROXINE SODIUM 0.03 MG/1
25 TABLET ORAL ONCE
Status: COMPLETED | OUTPATIENT
Start: 2021-07-20 | End: 2021-07-20

## 2021-07-20 RX ORDER — TRAZODONE HYDROCHLORIDE 50 MG/1
50 TABLET ORAL NIGHTLY PRN
Status: DISCONTINUED | OUTPATIENT
Start: 2021-07-20 | End: 2021-07-29 | Stop reason: HOSPADM

## 2021-07-20 RX ORDER — LORAZEPAM 1 MG/1
2 TABLET ORAL EVERY EVENING
Status: DISCONTINUED | OUTPATIENT
Start: 2021-07-20 | End: 2021-07-26

## 2021-07-20 RX ORDER — NICOTINE 21 MG/24HR
1 PATCH, TRANSDERMAL 24 HOURS TRANSDERMAL DAILY
Status: DISCONTINUED | OUTPATIENT
Start: 2021-07-20 | End: 2021-07-29 | Stop reason: HOSPADM

## 2021-07-20 RX ORDER — LORAZEPAM 1 MG/1
1 TABLET ORAL
Status: DISCONTINUED | OUTPATIENT
Start: 2021-07-21 | End: 2021-07-26

## 2021-07-20 RX ORDER — GABAPENTIN 100 MG/1
300 CAPSULE ORAL ONCE
Status: COMPLETED | OUTPATIENT
Start: 2021-07-20 | End: 2021-07-20

## 2021-07-20 RX ORDER — BENZTROPINE MESYLATE 1 MG/ML
2 INJECTION INTRAMUSCULAR; INTRAVENOUS 2 TIMES DAILY PRN
Status: DISCONTINUED | OUTPATIENT
Start: 2021-07-20 | End: 2021-07-29 | Stop reason: HOSPADM

## 2021-07-20 RX ORDER — LORAZEPAM 1 MG/1
1 TABLET ORAL ONCE
Status: COMPLETED | OUTPATIENT
Start: 2021-07-20 | End: 2021-07-20

## 2021-07-20 RX ORDER — LORAZEPAM 1 MG/1
1 TABLET ORAL EVERY MORNING
Status: DISCONTINUED | OUTPATIENT
Start: 2021-07-21 | End: 2021-07-27

## 2021-07-20 RX ORDER — HYDROXYZINE HYDROCHLORIDE 50 MG/ML
50 INJECTION, SOLUTION INTRAMUSCULAR EVERY 6 HOURS PRN
Status: DISCONTINUED | OUTPATIENT
Start: 2021-07-20 | End: 2021-07-29 | Stop reason: HOSPADM

## 2021-07-20 RX ORDER — HALOPERIDOL 5 MG/ML
5 INJECTION INTRAMUSCULAR EVERY 6 HOURS PRN
Status: DISCONTINUED | OUTPATIENT
Start: 2021-07-20 | End: 2021-07-29 | Stop reason: HOSPADM

## 2021-07-20 RX ORDER — HYDROXYZINE PAMOATE 50 MG/1
50 CAPSULE ORAL EVERY 6 HOURS PRN
Status: DISCONTINUED | OUTPATIENT
Start: 2021-07-20 | End: 2021-07-29 | Stop reason: HOSPADM

## 2021-07-20 RX ORDER — TRIAMTERENE AND HYDROCHLOROTHIAZIDE 37.5; 25 MG/1; MG/1
1 TABLET ORAL DAILY
COMMUNITY

## 2021-07-20 RX ORDER — GABAPENTIN 300 MG/1
300 CAPSULE ORAL 3 TIMES DAILY
Status: DISCONTINUED | OUTPATIENT
Start: 2021-07-20 | End: 2021-07-26

## 2021-07-20 RX ORDER — ACETAMINOPHEN 325 MG/1
650 TABLET ORAL EVERY 4 HOURS PRN
Status: DISCONTINUED | OUTPATIENT
Start: 2021-07-20 | End: 2021-07-29 | Stop reason: HOSPADM

## 2021-07-20 RX ORDER — ANASTROZOLE 1 MG/1
1 TABLET ORAL DAILY
Status: DISCONTINUED | OUTPATIENT
Start: 2021-07-20 | End: 2021-07-29 | Stop reason: HOSPADM

## 2021-07-20 RX ORDER — HALOPERIDOL 5 MG
5 TABLET ORAL EVERY 6 HOURS PRN
Status: DISCONTINUED | OUTPATIENT
Start: 2021-07-20 | End: 2021-07-29 | Stop reason: HOSPADM

## 2021-07-20 RX ADMIN — GABAPENTIN 300 MG: 100 CAPSULE ORAL at 09:54

## 2021-07-20 RX ADMIN — GABAPENTIN 300 MG: 300 CAPSULE ORAL at 20:48

## 2021-07-20 RX ADMIN — LORAZEPAM 1 MG: 1 TABLET ORAL at 10:00

## 2021-07-20 RX ADMIN — LORAZEPAM 2 MG: 1 TABLET ORAL at 20:47

## 2021-07-20 RX ADMIN — LEVOTHYROXINE SODIUM 25 MCG: 0.03 TABLET ORAL at 09:59

## 2021-07-20 RX ADMIN — ACETAMINOPHEN 650 MG: 325 TABLET ORAL at 10:45

## 2021-07-20 RX ADMIN — ACETAMINOPHEN 650 MG: 325 TABLET ORAL at 20:59

## 2021-07-20 RX ADMIN — ANASTROZOLE 1 MG: 1 TABLET, COATED ORAL at 09:58

## 2021-07-20 ASSESSMENT — LIFESTYLE VARIABLES: HISTORY_ALCOHOL_USE: NO

## 2021-07-20 ASSESSMENT — SLEEP AND FATIGUE QUESTIONNAIRES
DO YOU USE A SLEEP AID: YES
DIFFICULTY ARISING: NO
DIFFICULTY FALLING ASLEEP: YES
DIFFICULTY STAYING ASLEEP: NO
SLEEP PATTERN: INSOMNIA
DO YOU HAVE DIFFICULTY SLEEPING: NO
RESTFUL SLEEP: YES

## 2021-07-20 ASSESSMENT — PAIN SCALES - GENERAL
PAINLEVEL_OUTOF10: 8
PAINLEVEL_OUTOF10: 3

## 2021-07-20 NOTE — ED NOTES
Jayshree from [facility] called to say they aren't accepting anyone right now because they are at capacity. [facility] may have discharges today and will know more after 0900.   The number to call is [phone] after Eder Gutierrez RN  07/20/21 5574

## 2021-07-20 NOTE — PROGRESS NOTES
Behavioral Services  Medicare Certification Upon Admission    I certify that this patient's inpatient psychiatric hospital admission is medically necessary for:    [x] (1) Treatment which could reasonably be expected to improve this patient's condition,       [x] (2) Or for diagnostic study;     AND     [x](2) The inpatient psychiatric services are provided while the individual is under the care of a physician and are included in the individualized plan of care.     Estimated length of stay/service 4-7    Plan for post-hospital care outpatient follow-up     Electronically signed by Christine Avilez MD on 7/20/2021 at 5:26 PM

## 2021-07-20 NOTE — ED TRIAGE NOTES
The patient arrived via ems to the e.d. with a report that she had not been taking her psych meds for the past couple of weeks and that her behavior has been erratic and out of control.  The patient has been pink slipped by rome

## 2021-07-20 NOTE — ED NOTES
The patient is laying in bed resting with eyes closed. Respirations are easy and non-labored.  The patient has been in room under observation of staff and has not been inappropriate since her arrival.     Phan Huerta RN  07/19/21 7313
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
## 2021-07-20 NOTE — ED NOTES
Pt continues to rest on bed with eyes closed. No distress noted. Respirations even and unlabored.       Krystal Brown RN  07/20/21 2722

## 2021-07-20 NOTE — FLOWSHEET NOTE
Pt resting in bed with eyes closed . Respirations are even and unlabored. Close observation maintained.

## 2021-07-20 NOTE — GROUP NOTE
Group Therapy Note    Date: 7/20/2021    Group Start Time: 1415  Group End Time: 1450  Group Topic: Psychoeducation    [facility]    JOAN Banuelos, PARVEZ        Group Therapy Note    Attendees:7         Patient's Goal: To participate in coping skills group therapy  Notes:  Patient likes to listen to 63's music and take a bath whenever she needs to reduce stress.     Status After Intervention:  Improved    Participation Level: Interactive    Participation Quality: Appropriate      Speech:  normal      Thought Process/Content: Logical      Affective Functioning: Congruent      Mood: elevated      Level of consciousness:  Alert      Response to Learning: Able to verbalize current knowledge/experience      Endings: None Reported    Modes of Intervention: Education      Discipline Responsible: /Counselor      Signature:  JOAN Banuelos, PARVEZ

## 2021-07-20 NOTE — ED NOTES
Pt continues resting on bed with eyes closed. Respirations even and unlabored. Close observation maintained.       Tadeo Cardenas RN  07/20/21 6129

## 2021-07-20 NOTE — CARE COORDINATION
Brief Intervention and Referral to Treatment Summary    Patient was provided PHQ-9, AUDIT and DAST Screening:      PHQ-9 Score: NC  AUDIT Score: 0   DAST Score:  0    Patients substance use is considered      Low Risk/Healthy X  Moderate Risk  Harmful  Dependent    Patients depression is considered:  NC     Minimal  Mild   Moderate  Moderately Severe  Severe    Brief Education Was Provided n/a     Patient was receptive  Patient was not receptive      Brief Intervention Is Provided (Only for AUDIT or DAST)  N/a     Patient reports readiness to decrease and/or stop use and a plan was discussed   Patient denies readiness to decrease and/or stop use and a plan was not discussed      Recommendations/Referrals for Brief and/or Specialized Treatment Provided to Patient       Pt is linked with [address] [place] [address] for counseling and pharm management. Pt may benefit from a php/group setting as well. Pt has no AOD issues.  Electronically signed by KARIN Vinson on 7/20/2021 at 2:44 PM
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
## 2021-07-20 NOTE — GROUP NOTE
Group Therapy Note    Date: 7/20/2021    Group Start Time: 1900  Group End Time: 1930  Group Topic: Recreational    [facility]    Ady Crews        Group Therapy Note    Attendees: 7/12         Patient's Goal: To participate in group activity and be positive    Notes: Patient actively participated in group activity. Patient also shared with and supported others. Status After Intervention:  Improved    Participation Level:  Active Listener and Interactive    Participation Quality: Appropriate, Attentive, Sharing and Supportive      Speech:  normal      Thought Process/Content: Logical      Affective Functioning: Congruent      Mood: euthymic      Level of consciousness:  Alert and Attentive      Response to Learning: Progressing to goal      Endings: None Reported    Modes of Intervention: Activity      Discipline Responsible: [address] [employer]      Signature:  Ady Crews

## 2021-07-20 NOTE — ED NOTES
Pt cooperative with obtaining vital signs. Denies needs/ concerns. Returns to resting with audible snoring. Close observation maintained.       Poli Rashid RN  07/20/21 6344

## 2021-07-20 NOTE — H&P
PSYCHIATRIC EVALUATION      CHIEF COMPLAINT:  Irene     HISTORY OF PRESENT ILLNESS: Layla Manzo  is a 59 y.o. female with history of treatment for mood disorder who was admitted from the emergency department due to not sleeping, anxiety, racing thoughts. Patient acknowledges current symptoms of racing thoughts, anxiety. Current stressors include not sleeping. Layla Manzo is a 59 y.o. female who per chart reviews past medical history of migraines anxiety depression breast cancer hypothyroidism presents to the emergency department for a psychiatric evaluation. Patient is pink slipped by the [facility] at this time. Per pink slip, patient appears to be in a manic episode as evidenced by restlessness irritability agitation decreased need for sleep and impulsivity. Patient  said that she goes multiple days without sleeping and plays music very loud at all hours of the night. She has not been compliant with her psychotic medications from a recent hospitalization. Patient states she is fine and does not know why she is in the hospital, does not feel that evaluation is necessary. She states she got into an argument with her daughter who then called the police. She denies SI, HI, AVH, drug and alcohol use. No medical complaints at this time. Patient is circumstatial, hyperverbose, mildly pressured speech. Perseverative on family members who she feels have been unjustly causing her \"problems\", including her , her daughter. Says her daughter is Bipolar Disorder. Says she sees Dr. Shailesh Armas. Patient reports that she had been on a psychiatric hold recently at another hospital ([facility]). \"The only thing I am on is anastrozole and gabapentin (Neurontin) and 3 mg of lorazepam (Ativan)\". From Dr. Kyra Ramsey note from 6.1.21 Patient Identification: [id number]  23 yrs to second  Tushar Andrew,  from [facility], 2 adult children: Ana García from first marriage, Janie from second. 1 grandchild.  Pt had an episode of depression after her son had a head injury years ago and saw a therapist in Dr Nieves Rashede office. Was given medication by family doctors. Saw OUMOU Palma and this provider at [facility] M[facility]. Hospitalized [facility] EMERGENCY [facility] AT [facility] 2013 and [facility] [address] 2021. She has taken Prozac, Paxil (jittery), Zoloft, Celexa, Lexapro, Effexor, Pristiq (worked but cant afford), Wellbutrin (anxious), Remeron, Pamelor, Elavil, Ativan, Ambien, Klonopin , Zyprexa, Abilify (unsure she ever tried it), Seroquel, (worked but gained 50 lbs), Topamax (angry, panic attacks) , Risperdal (\"on speed\"), Trazodone (slee paralysis), Buspar (ineffective), Perphenazine, Cymbalta. Saw Fatou Mao    States she was put on Depakote and Seroquel but stopped taking them after discharge. She is currently taking Ativan 1 mg AM and 2 mg HS. She is very unhappy with the hospitalization. States she was \"too drugged up\" and had several panic attacks. States they \"messed up\" her blood pressure medication also. States it was like a [facility] camp and she never felt more traumatized. She does not believe she needed to be in the hospital and does not believe she was manic. Today she states she is fine. States she slept well last night -- did a lot of work in her house and was tired. States she is angry about the hospitalization. Angry [place] lies\" about her drinking and behavior. Denies feeling depressed. No SI. She states she has not used any extra Ativan and has only used it as prescribed which is different from what [place] states. States she was drinking 2-3 beers 4 days a week. Going on a short vacation with family next week to [place]. We discussed diagnosis and medication options. She declines Seroquel or other mood stabilizers. Strongly encouraged her to stop drinking ETOH.  Will stay with current dose of Ativan and continue to slowly wean.          Kmberg  called and said that she was brought home by [employer] and that she drove till she ran out of gas 80 miles away. His number is [phone] and she gives permission To speak to her . Spoke with Izzyverena Jarrett who states Taylor Neville is at [facility] on a pink slip and is not being cooperative. He adds history that she has not been taking Ativan as directed even druing the weaning process and has lied about several things related to stressors. He is concerned he has had not communication from the hospital nurses and doctors since her admission.          PAST MEDICAL HISTORY:       Diagnosis Date    Anxiety     Cancer [facility]) 05/10/2017    DCIS left breast    Depression     Manic depression (HCC)     Migraines     Neuropathy     Thyroid disease        MEDICAL ROS: denies somatic complaints     ALLERGIES: Sulfa antibiotics    PAST PSYCHIATRIC HISTORY:   Prior Diagnosis: Generalized Anxiety Disorder, Major Depressive Disorder  Rule out Bipolar Disorder   Psychiatrist: Dr. Shamika Encarnacion (but she recently retired)   Psychiatric Medications: lorazepam (Ativan), gabapentin  Therapist: none  Hospitalization: yes last in [address] recently   Hx of Suicidal Attempts: yes, several years ago reported she had attempted suicide   Hx of violence:  no  ECT: no    Psychiatric Review of Systems       Depression: denies      Irene or Hypomania:  patient denies, but has had significant symptoms of impulsivity and irritability      Panic Attacks:  no     Phobias:  no     Obsessions and Compulsions:  no     PTSD : no      Hallucinations:  no      Delusions:  no     Appetite normal     Sleep disturbance Yes probably sleeping 7-9 hours      Fatigue \"never\"      Loss of pleasure No     Impulsive behavior Yes reported by family in chart, denied by patient      FAMILY PSYCHIATRIC HISTORY: :my daughter has Bipolar Disorder    SOCIAL HISTORY:   Born and Raised: [place]   Describes Childhood: good   Education: almost had a four year degree in psychology then found out I had cancer - breast cancer diagnosis 3.5 years prior   Employment: [employer] with   Eosinophils Absolute 07/19/2021 0.2  0.0 - 0.7 K/uL Final    Basophils Absolute 07/19/2021 0.1  0.0 - 0.2 K/uL Final    Total CK 07/19/2021 232* 0 - 170 U/L Final    Sodium 07/19/2021 136  135 - 144 mEq/L Final    Potassium 07/19/2021 3.7  3.4 - 4.9 mEq/L Final    Chloride 07/19/2021 101  95 - 107 mEq/L Final    CO2 07/19/2021 26  20 - 31 mEq/L Final    Anion Gap 07/19/2021 9  9 - 15 mEq/L Final    Glucose 07/19/2021 95  70 - 99 mg/dL Final    BUN 07/19/2021 9  8 - 23 mg/dL Final    CREATININE 07/19/2021 0.82  0.50 - 0.90 mg/dL Final    GFR Non- 07/19/2021 >60.0  >60 Final    Comment: >60 mL/min/1.73m2 EGFR, calc. for ages 25 and older using the  MDRD formula (not corrected for weight), is valid for stable  renal function.  GFR  07/19/2021 >60.0  >60 Final    Comment: >60 mL/min/1.73m2 EGFR, calc. for ages 25 and older using the  MDRD formula (not corrected for weight), is valid for stable  renal function.  Calcium 07/19/2021 9.3  8.5 - 9.9 mg/dL Final    Total Protein 07/19/2021 6.6  6.3 - 8.0 g/dL Final    Albumin 07/19/2021 4.4  3.5 - 4.6 g/dL Final    Total Bilirubin 07/19/2021 <0.2  0.2 - 0.7 mg/dL Final    Alkaline Phosphatase 07/19/2021 54  40 - 130 U/L Final    ALT 07/19/2021 18  0 - 33 U/L Final    AST 07/19/2021 23  0 - 35 U/L Final    Globulin 07/19/2021 2.2* 2.3 - 3.5 g/dL Final    Ethanol Lvl 07/19/2021 <10  mg/dL Final    Ethanol percent 07/19/2021 Not indicated  G/dL Final    Cholesterol, Total 07/19/2021 162  0 - 199 mg/dL Final    ATP III Cholesterol classification is Desirable.  Triglycerides 07/19/2021 124  0 - 150 mg/dL Final    ATP III Triglycerides Classification is Normal.    HDL 07/19/2021 63* 40 - 59 mg/dL Final    Comment: ATP III HDL Cholesterol Classification is high.   Expected Values:    Males:    >55 = No Risk            35-55 = Moderate Risk            <35 = High Risk    Females:  >65 = No Risk            45-65 = Moderate Risk            <45 = High Risk    NCEP Guidelines: Third Report May 2001  >59 = negative risk factor for CHD  <40 = major risk factor for CHD      LDL Calculated 07/19/2021 74  0 - 129 mg/dL Final    ATP III LDL Classification is Optimal.    Color, UA 07/19/2021 Yellow  Straw/Yellow Final    Clarity, UA 07/19/2021 Clear  Clear Final    Glucose, Ur 07/19/2021 Negative  Negative mg/dL Final    Bilirubin Urine 07/19/2021 Negative  Negative Final    Ketones, Urine 07/19/2021 Negative  Negative mg/dL Final    Specific Gravity, UA 07/19/2021 1.013  1.005 - 1.030 Final    Blood, Urine 07/19/2021 Negative  Negative Final    pH, UA 07/19/2021 6.5  5.0 - 9.0 Final    Protein, UA 07/19/2021 Negative  Negative mg/dL Final    Urobilinogen, Urine 07/19/2021 0.2  <2.0 E.U./dL Final    Nitrite, Urine 07/19/2021 Negative  Negative Final    Leukocyte Esterase, Urine 07/19/2021 MODERATE* Negative Final    Urine Reflex to Culture 07/19/2021 Yes   Final    Amphetamine Screen, Urine 07/19/2021 Neg  Negative <1000 ng/mL Final    Barbiturate Screen, Ur 07/19/2021 Neg  Negative < 200 ng/mL Final    Benzodiazepine Screen, Urine 07/19/2021 Neg  Negative < 200 ng/mL Final    Cannabinoid Scrn, Ur 07/19/2021 Neg  Negative < 50 ng/mL Final    Cocaine Metabolite Screen, Urine 07/19/2021 Neg  Negative < 300 ng/mL Final    Opiate Scrn, Ur 07/19/2021 Neg  Negative < 300 ng/mL Final    PCP Screen, Urine 07/19/2021 Neg  Negative < 25 ng/mL Final    Methadone Screen, Urine 07/19/2021 Neg  Negative <300 ng/mL Final    Propoxyphene Scrn, Ur 07/19/2021 Neg  Negative <300 ng/mL Final    Oxycodone Urine 07/19/2021 Neg  Negative <100 ng/mL Final    Drug Screen Comment: 07/19/2021 see below   Final    Comment: This method is a screening test to detect only these drug  classes as part of a medical workup. Confirmatory testing  by another method should be ordered if clinically indicated.       TSH 07/19/2021 2.060  0.440 - 3.860 uIU/mL Final    Salicylate, Serum 71/50/4578 <0.3* 15.0 - 30.0 mg/dL Final    Comment: Anti-pyretic:  3.0-10.0 mg/dL  Anti-inflammatory:  15.0-30.0 mg/dL  Toxic: >30.0 mg/dL      SARS-CoV-2, NAAT 07/19/2021 Not Detected  Not Detected Final    Comment: Rapid NAAT:   Negative results should be treated as presumptive and,  if inconsistent with clinical signs and symptoms or necessary for  patient management, should be tested with an alternative molecular  assay. Negative results do not preclude SARS-CoV-2 infection and  should not be used as the sole basis for patient management decisions. This test has been authorized by the FDA under an Emergency Use  Authorization (EUA) for use by authorized laboratories.     Fact sheet for Healthcare Providers:  BuildHer.es  Fact sheet for Patients: BuildHer.es    METHODOLOGY: Isothermal Nucleic Acid Amplification      Ventricular Rate 07/19/2021 77  BPM Final    Atrial Rate 07/19/2021 77  BPM Final    P-R Interval 07/19/2021 164  ms Final    QRS Duration 07/19/2021 78  ms Final    Q-T Interval 07/19/2021 386  ms Final    QTc Calculation (Bazett) 07/19/2021 436  ms Final    P Axis 07/19/2021 68  degrees Final    R Axis 07/19/2021 6  degrees Final    T Axis 07/19/2021 8  degrees Final    RBC, UA 07/19/2021 0-2  0 - 2 /HPF Final    Renal Epithelial, UA 07/19/2021 3-5* /HPF Final    Bacteria, UA 07/19/2021 Negative  Negative /HPF Final    Hyaline Casts, UA 07/19/2021 1-3  0 - 5 /HPF Final    WBC, UA 07/19/2021 20-50* 0 - 5 /HPF Final    Epithelial Cells, UA 07/19/2021 0-2  0 - 5 /HPF Final    CK-MB 07/19/2021 4.8* 0.0 - 3.8 ng/mL Final    CK-MB Index 07/19/2021 2.1  0.0 - 3.5 % Final           MEDICATIONS: Current Facility-Administered Medications: anastrozole (ARIMIDEX) tablet 1 mg, 1 mg, Oral, Daily  haloperidol lactate (HALDOL) injection 5 mg, 5 mg, Intramuscular, Q6H PRN **OR** haloperidol

## 2021-07-20 NOTE — GROUP NOTE
Group Therapy Note    Date: 7/20/2021    Group Start Time: 1062  Group End Time: 1699  Group Topic: Healthy Living/Wellness    [facility]    Narendra Ramon RN; Ana Hines RN        Group Therapy Note    Attendees: 5/18         Patient's Goal:  To discuss ways to build self esteem    Notes:  Pt actively participated in group. Status After Intervention:  Improved    Participation Level:  Active Listener and Interactive    Participation Quality: Appropriate and Sharing      Speech:  normal      Thought Process/Content: Logical      Affective Functioning: Congruent      Mood: WNL      Level of consciousness:  Alert and Oriented x4      Response to Learning: Progressing to goal      Endings: None Reported    Modes of Intervention: Education, Support, Socialization and Problem-solving      Discipline Responsible: Registered Nurse      Signature:  Narendra Ramon RN

## 2021-07-20 NOTE — ED NOTES
Pt awake and oriented to time. Pt covers head and states she is going back to sleep. Close observation maintained.       Betty Perez RN  07/20/21 [id number]

## 2021-07-20 NOTE — ED NOTES
Pt continues resting on bed with eyes closed laying on left side. No distress noted. Close observation maintained.       Poli Rashid RN  07/20/21 [id number]

## 2021-07-20 NOTE — ED NOTES
Pt continues resting on bed with eyes closed. Spontaneous movements noted and audible snoring observed at times. Respirations even and unlabored. No distress noted. Close observation maintained.       Jazmine Reid RN  07/20/21 0233

## 2021-07-20 NOTE — ED NOTES
Provisional Diagnosis:    Major Depression and Bipolar    Psychosocial and Contextual Factors:    Patient lives with her     C-SSRS Summary:     Patient: C-SSRS Suicide Screening  1) Within the past month, have you wished you were dead or wished you could go to sleep and not wake up? : No  2) Have you actually had any thoughts of killing yourself? : No  6) Have you ever done anything, started to do anything, or prepared to do anything to end your life?: No    Family: None at bedside    Agency: [facility]          Abuse Assessment  Physical Abuse: Denies  Verbal Abuse: Denies  Emotional abuse: Denies  Financial Abuse: Denies  Sexual abuse: Denies  Elder abuse: No    Clinical Summary:    59year old female arrived to the ER via [address] [place] [address],[address] on a pink slip from the [facility]. Per Keyla patient has been decompensating. Patient was experiencing hypomania as evidence by the patient's restlessness, irritability ans agitation toward her family, impulsivity and decreased need for sleep. Patient is minimizing as evidence by her family reports that she can stay up throughout the night and blast music. The patient's  told [facility] clinician that she stays up for a couple days and then will only sleep a short period of time. Per family the patient has drove aimlessly even running out of gas and then walking aimlessly. Patient does deny all even though she has sores on her feet and legs. Patient states this is all my families pao, they are the ones with the problem. She states she sleeps when she is tired. Patient denies SI/HI/AV even thought patient has been crying through out my assessment.      Level of Care Disposition:      Per Dr Lorena Contreras, RN  07/20/21 [id number]

## 2021-07-20 NOTE — PROGRESS NOTES
Patient did not attend group despite staff encouragement.   Electronically signed by Shona Marie on 7/20/2021 at 5:12 PM

## 2021-07-20 NOTE — PROGRESS NOTES
Patient was anxious, talkative, tearful at times and had a sad affect. She was pleasant and cooperative. Patient denies being depressed or stressed. She denies the need to be here, she started to cry and wants to go home. She has some relationship issues with her family and is upset with daughter because she is here. She drinks alcohol but denies using drugs. Per chart, patient was driving somewhere and ran out of gas and then she walked home barefoot. She denies being suicidal.  She denies any auditory or visual hallucinations. She enjoys reading and yard work.  Electronically signed by Viry Fernando [address] on 7/20/2021 at 2:26 PM

## 2021-07-20 NOTE — CARE COORDINATION
Psychosocial Assessment    Current Level of Psychosocial Functioning     Independent X  Dependent    Minimal Assist     Comments:  Reports residing with her family. Able to function independently. Psychosocial High Risk Factors (check all that apply)    Unable to obtain meds   Chronic illness/pain    Substance abuse   Lack of Family Support   Financial stress   Isolation   Inadequate Community Resources X  Suicide attempt(s)  Not taking medications X   Victim of crime   Developmental Delay  Unable to manage personal needs    Age 72 or older   Homeless  No transportation   Readmission within 30 days  Unemployment  Traumatic Event    Family/Supports identified:  Refused to sign collateral. Reports not wanting her family involved in services. Sexual Orientation:  In heterosexual marriage     Patient Strengths: housing, natural supports     Patient Barriers:  Lack of insight, med compliance     Safety plan: will need to be completed     CMHC/MH history: reports recently being in [facility] for 12 days. Plan of Care:  medication management, group/individual therapies, family meetings, psycho -education, treatment team meetings to assist with stabilization    Initial Discharge Plan:  Gather collateral and discuss with tx team.     Clinical Summary:  Pt is 59year old female who presented to ER after being pink slipped by [facility] due to manic episode. Family called [facility] due to impulsive decision making, irritability, agitation. Decrease sleep. Pt was minimizing all symptoms during assessment. Pt was heavily focused on family dynamics. Not compliant with medications per family reports. Pt was recently in [facility] for psych reasons. Pt denies SI/HI. No AVH noted. Pt refused to sign collateral and does not want her family involved.  Electronically signed by KARIN Browne on 7/20/2021 at 2:42 PM

## 2021-07-20 NOTE — ED NOTES
Medicated as ordered. Updated on treatment plan for admission to [place] [id number] understanding.      Carlito Jorge RN  07/20/21 1002

## 2021-07-21 LAB
SARS-COV-2, PCR: NOT DETECTED
URINE CULTURE, ROUTINE: NORMAL

## 2021-07-21 PROCEDURE — 1240000000 HC EMOTIONAL WELLNESS R&B

## 2021-07-21 PROCEDURE — 6370000000 HC RX 637 (ALT 250 FOR IP): Performed by: NURSE PRACTITIONER

## 2021-07-21 PROCEDURE — 87635 SARS-COV-2 COVID-19 AMP PRB: CPT

## 2021-07-21 PROCEDURE — 6370000000 HC RX 637 (ALT 250 FOR IP): Performed by: PSYCHIATRY & NEUROLOGY

## 2021-07-21 PROCEDURE — 99232 SBSQ HOSP IP/OBS MODERATE 35: CPT | Performed by: PSYCHIATRY & NEUROLOGY

## 2021-07-21 PROCEDURE — 6370000000 HC RX 637 (ALT 250 FOR IP): Performed by: PHYSICIAN ASSISTANT

## 2021-07-21 RX ORDER — LEVOTHYROXINE SODIUM 0.05 MG/1
50 TABLET ORAL DAILY
Status: DISCONTINUED | OUTPATIENT
Start: 2021-07-21 | End: 2021-07-29 | Stop reason: HOSPADM

## 2021-07-21 RX ORDER — LEVOTHYROXINE SODIUM 0.05 MG/1
50 TABLET ORAL DAILY
Status: DISCONTINUED | OUTPATIENT
Start: 2021-07-22 | End: 2021-07-21

## 2021-07-21 RX ORDER — VITAMIN B COMPLEX
2000 TABLET ORAL DAILY
Status: DISCONTINUED | OUTPATIENT
Start: 2021-07-21 | End: 2021-07-29 | Stop reason: HOSPADM

## 2021-07-21 RX ORDER — TRIAMTERENE AND HYDROCHLOROTHIAZIDE 37.5; 25 MG/1; MG/1
1 TABLET ORAL DAILY
Status: DISCONTINUED | OUTPATIENT
Start: 2021-07-21 | End: 2021-07-29 | Stop reason: HOSPADM

## 2021-07-21 RX ORDER — SUMATRIPTAN 50 MG/1
100 TABLET, FILM COATED ORAL DAILY PRN
Status: DISCONTINUED | OUTPATIENT
Start: 2021-07-21 | End: 2021-07-29 | Stop reason: HOSPADM

## 2021-07-21 RX ORDER — QUETIAPINE FUMARATE 100 MG/1
100 TABLET, FILM COATED ORAL NIGHTLY
Status: DISCONTINUED | OUTPATIENT
Start: 2021-07-21 | End: 2021-07-22

## 2021-07-21 RX ORDER — LEVOTHYROXINE SODIUM 0.03 MG/1
25 TABLET ORAL DAILY
Status: DISCONTINUED | OUTPATIENT
Start: 2021-07-21 | End: 2021-07-21

## 2021-07-21 RX ADMIN — GABAPENTIN 300 MG: 300 CAPSULE ORAL at 09:39

## 2021-07-21 RX ADMIN — TRIAMTERENE AND HYDROCHLOROTHIAZIDE 1 TABLET: 37.5; 25 TABLET ORAL at 12:28

## 2021-07-21 RX ADMIN — QUETIAPINE FUMARATE 100 MG: 100 TABLET ORAL at 18:45

## 2021-07-21 RX ADMIN — ACETAMINOPHEN 650 MG: 325 TABLET ORAL at 21:42

## 2021-07-21 RX ADMIN — LEVOTHYROXINE SODIUM 50 MCG: 0.05 TABLET ORAL at 12:28

## 2021-07-21 RX ADMIN — LORAZEPAM 2 MG: 1 TABLET ORAL at 18:45

## 2021-07-21 RX ADMIN — ANASTROZOLE 1 MG: 1 TABLET, COATED ORAL at 12:28

## 2021-07-21 RX ADMIN — LORAZEPAM 1 MG: 1 TABLET ORAL at 09:39

## 2021-07-21 RX ADMIN — LORAZEPAM 1 MG: 1 TABLET ORAL at 12:48

## 2021-07-21 RX ADMIN — ACETAMINOPHEN 650 MG: 325 TABLET ORAL at 03:58

## 2021-07-21 RX ADMIN — Medication 2000 UNITS: at 12:28

## 2021-07-21 ASSESSMENT — PAIN SCALES - GENERAL
PAINLEVEL_OUTOF10: 3
PAINLEVEL_OUTOF10: 3

## 2021-07-21 NOTE — CONSULTS
[facility]  MEDICINE    HISTORY AND PHYSICAL EXAM    PATIENT NAME:  Frank Rangel    MRN:  [id number]  SERVICE DATE:  7/21/2021   SERVICE TIME:  9:26 AM    Primary Care Physician: Odell Michaels DO         SUBJECTIVE  CHIEF COMPLAINT:  Medically appropriate for inpatient psychiatry admission. Consult for medical H/P encounter. HPI:  This is a 59 y.o. female who presents with  PMHx HTN, hypothyroidism, breast CA, migraines, anxiety and neuropathy presented to emergency room after being pink slipped from [place] due to decompensation. Has been derrick.  reported that patient will stay up for days at a time and sleep for minimal period. Has been restless, irritable, and noncompliant with psychiatric medications. Patient cleared from emergency room for psychiatric care. Patient Seen, Chart, Labs, Radiologystudies, and Consults reviewed. Patient denies headache, chest pain, shortness of breath, N/V/D/C, fever/chills.        PAST MEDICAL HISTORY:    Past Medical History:   Diagnosis Date    Anxiety     Cancer ([facility] 05/10/2017    DCIS left breast    Depression     [facility]     Migraines     Neuropathy     Thyroid disease      PAST SURGICAL HISTORY:    Past Surgical History:   Procedure Laterality Date    BREAST BIOPSY Left 5/23/2017    INJECTION METHYLENE BLUE LEFT BREAST ULTRASOUND GUIDED LEFT BREAST LUMPECTOMY LEFT SENTINEL NODE BIOPSY, 90 MINS, EVICEL 2ML, PAT ON ADMIT  performed by Shaji Blake MD at [address]      t flap     BREAST SURGERY Left 6/27/13    exploration of left nipple with excisonal bx of ducts    BUNIONECTOMY      2006   [address], [address]    COLONOSCOPY      HYSTERECTOMY      HYSTERECTOMY, TOTAL ABDOMINAL      with BSO    MASTECTOMY Left     [place] COLON CA SCRN NOT  [address] IND N/A 9/25/2018    COLONOSCOPY performed by Stefanie Jean Baptiste MD at [facility]:    Family History   Problem Relation Age of Onset    High Blood Pressure Mother     High Cholesterol Mother     Cancer Father         brain cancer    Stroke Father     Other Father         ulcerative colitis    High Blood Pressure Father     Cancer Sister         uterine cancer    High Blood Pressure Sister     High Blood Pressure Brother      SOCIAL HISTORY:    Social History     Socioeconomic History    Marital status:      Spouse name: Not on file    Number of children: Not on file    Years of education: Not on file    Highest education level: Not on file   Occupational History    Not on file   Tobacco Use    Smoking status: Current Every Day Smoker     Packs/day: 1.00     Years: 13.00     Pack years: 13.00     Types: Cigarettes    Smokeless tobacco: Never Used    Tobacco comment: start age 10   Vaping Use    Vaping Use: Never used   Substance and Sexual Activity    Alcohol use: Yes     Comment: occasional     Drug use: No    Sexual activity: Yes   Other Topics Concern    Not on file   Social History Narrative    Not on file     Social Determinants of Health     Financial Resource Strain:     Difficulty of Paying Living Expenses:    Food Insecurity:     Worried About Running Out of Food in the Last Year:     Ran Out of Food in the Last Year:    Transportation Needs:     Lack of Transportation (Medical):      Lack of Transportation (Non-Medical):    Physical Activity:     Days of Exercise per Week:     Minutes of Exercise per Session:    Stress:     Feeling of Stress :    Social Connections:     Frequency of Communication with Friends and Family:     Frequency of Social Gatherings with Friends and Family:     Attends Muslim Services:     Active Member of Clubs or Organizations:     Attends Club or Organization Meetings:     Marital Status:    Intimate Partner Violence:     Fear of Current or Ex-Partner:     Emotionally Abused:     Physically Abused:     Sexually Abused:      MEDICATIONS: Current Facility-Administered Medications   Medication Dose Route Frequency Provider Last Rate Last Admin    anastrozole (ARIMIDEX) tablet 1 mg  1 mg Oral Daily Marcelina Parks PA-C   1 mg at 07/20/21 1927    haloperidol lactate (HALDOL) injection 5 mg  5 mg Intramuscular Q6H PRN Chiquis Finnegan MD        Or    haloperidol (HALDOL) tablet 5 mg  5 mg Oral Q6H PRN Chiquis Finnegan MD        hydrOXYzine (VISTARIL) capsule 50 mg  50 mg Oral Q6H PRN Chiquis Finnegan MD        Or    hydrOXYzine (VISTARIL) injection 50 mg  50 mg Intramuscular Q6H PRN Chiquis Finnegan MD        acetaminophen (TYLENOL) tablet 650 mg  650 mg Oral Q4H PRN Chiquis Finnegan MD   650 mg at 07/21/21 0358    traZODone (DESYREL) tablet 50 mg  50 mg Oral Nightly PRN Chiquis Finnegan MD        benztropine mesylate (COGENTIN) injection 2 mg  2 mg Intramuscular BID PRN Chiquis Finnegan MD        magnesium hydroxide (MILK OF MAGNESIA) 400 MG/5ML suspension 30 mL  30 mL Oral Daily PRN Chiquis Finnegan MD        nicotine (NICODERM CQ) 21 MG/24HR 1 patch  1 patch Transdermal Daily Chiqusi Finnegan MD   1 patch at 07/20/21 1831    gabapentin (NEURONTIN) capsule 300 mg  300 mg Oral TID Chiquis Finnegan MD   300 mg at 07/20/21 2048    LORazepam (ATIVAN) tablet 2 mg  2 mg Oral QPM Chiquis Finnegan MD   2 mg at 07/20/21 2047    LORazepam (ATIVAN) tablet 1 mg  1 mg Oral QAM Chiquis Finnegan MD        LORazepam (ATIVAN) tablet 1 mg  1 mg Oral Lunch Chiquis Finnegan MD           ALLERGIES: Sulfa antibiotics    REVIEW OF SYSTEM:   ROS as noted in HPI, 12 point ROS reviewed and otherwise negative.     OBJECTIVE  PHYSICAL EXAM: BP (!) 158/104   Pulse 84   Temp 94.4 °F (34.7 °C) (Oral)   Resp 18   Ht 5' 7\" (1.702 m)   Wt 160 lb (72.6 kg)   SpO2 99%   BMI 25.06 kg/m²   CONSTITUTIONAL:  awake, alert, cooperative, no apparent distress, and appears stated age  EYES:  Lids and lashes normal, conjunctiva normal  ENT:  Normocephalic, without obvious abnormality, atraumatic, sinuses nontender on palpation, external ears without lesions, oral pharynx with moist mucus membranes, tonsils without erythema or exudates, gums normal and good dentition. NECK:  Supple, symmetrical, trachea midline  LUNGS:  clear to auscultation bilaterally, no crackles or wheezing  CARDIOVASCULAR: regular rate and rhythm, normal S1 and S2  ABDOMEN: normal bowel sounds, soft, non-distended, non-tender  MUSCULOSKELETAL:  There is no redness, warmth, or swelling of the joints. NEUROLOGIC:  Awake, alert, oriented to name, place and time. Cranial nerves II-XII are grossly intact. Motor is 5 out of 5 bilaterally. Sensory is intact.  gait is normal.  SKIN:  Warm and dry  DATA:     Diagnostic tests reviewed for today's visit:    Most recent labs and imaging results reviewed. ASSESSMENT AND PLAN  Active Problems:  Bipolar 1 disorder [facility])  Patient admitted to behavorial health for evaluation and treatment     HTN: BP slightly elevated. Maxzide resumed  Hypothyroidism: Continue levothyroxine. Patient reports she is taking 50mcg daily. Hx Breast ca: continue anastrozole  Asymptomatic bacteruria  Nicotine dependence: ; nicoderm patch  Neuropathy: continue gabapentin    This is only a history and physical examination and not medical management. The patient is to contact and follow up with their primary care physician and go over any abnormal labs, imaging, findings, medical concerns, or conditions that we have and have not addressed during this encounter.     Plan of care discussed with: patient    SIGNATURE: IDALMIS Arias NP  DATE: July 21, 2021  TIME: 9:26 AM

## 2021-07-21 NOTE — CARE COORDINATION
FAMILY COLLATERAL NOTE    Family/Support Name: Abiola King  Contact #: [phone]  Relationship to Pt[de-identified]           Family/Support contact aware of hospitalization: yes  Presenting Symptoms/Current Concerns:  Pink slipped by [place]. Manic behaviors. Top 3 Life Stressors:   Symptoms  Isolating  Lack of sleep      Background History Relevant to Current Hospitalization:  \"shes angry at the world right now. Anyone that suggests fo her to get help is always on her bad list.\" reports pt has been violent recently hitting, punching, shoving. Reports something is going on and she is refusing help. \"the entire family wants her to get help, she has so much support. \" reports pt has started multiple projects recently, extremely unorganized. Reports pt likes to cook but has been missing entire parts of recipes. Reports pt has been getting in her car and just driving aimlessly. Reports she will see her nephew 2 hours away and will take 4 hours to get there and 10 to get home. She cant keep directions, doesn't know where she is going. Reports pt has been very manic historically. Reports pt was recently diagnosed with bipolar at [facility]. Family suggested  This diagnosis for years but pt is refusing the medications for bipolar due to weight gain. Reports pt is not sleeping, stay up f or 2-3 days then sleep for 12 hours. Reports pt has been playing music nonstop. Reports pt talking about suicide is new for pt identifying ways such as overdosing or crashing her car. Police were at the house on Friday due to pt pushing adult daughter and  said that pt was able to pull herself together for the police being present. reports pt wrote a letter for help, not a suicide letter but requesting help. She has a family history of dementia, mom and dad. Daughter broke her ankle during an argument with pt.  She tried to hop over a fence and broke her ankle. Reports pt's behaviors started declining prior to going to [facility]. Reports pt had been purchasing things excessively, wandering areas. Pt went to buy tables in [facility] at 15. Ran out of gas at 2.  got her home at 4am. Next night, pt was found walking down [address] in [address] at 2am. Pt then volunteered for 72 hour hold. Then pink slipped. Then pt was probated and was given extra time at [facility] but was able to get out of the hospital within 3 days of probate. Dr. Sofia Francis wants to release her from her care because she is retiring. Reports he has tried giving her the prescribed meds and pt would call him at work begging for where it was. Pt took 8mg ativan daily for years but pt was withdrawn from it. Pt broke open a safe for the ativan. Reports she is prescribed 3 mg. Family Mental Health/Substance Use History:   Brother  by suicide roughly 39 years go/[place]/drug issues. Other brother struggled with drug use. Mom and dad dementia     One son has been clean from heroin for 5 years. Support Network's Goal for Hospitalization: for her to get better.      Discharge Plan:  Discharge home and link with outpatient services       Support Network Supportive of Discharge Plan:   In agreement     Support can confirm Safety of Location and Security of Weapons: denies       Support agreeable to Safeguard and Monitor Medications (including Prescription and OTC): agreeable     Identified Barriers to Compliance with Discharge Plan: lack of insight     Recommendations for Support Network: be available for follow up calls         Electronically signed by KARIN Gutierrez on 2021 at 9:58 AM

## 2021-07-21 NOTE — PROGRESS NOTES
BEHAVIORAL HEALTH FOLLOW-UP NOTE I    7/21/2021    [x] Patient was seen and examined in person  [x] Chart reviewed  [x] Labs reviewed  [x] Patient's case discussed with staff/team    Chief Complaint: \"I shouldn't be here\"    Interim History: Patient reports she couldn't sleep at all last night. Perseverant on discharge. Spoke with  about patient's recent behavior changes. Patient was yelling at people at home, hitting family members. Patient     Neuro work-up before?    reports she had been drinking a little more heavily, about 3 drinks/night or less for several weeks. When she was coming off of lorazepam (Ativan), she began acting more irritable and angry. Patient mentioned suicide yesterday Mable Books mentioned she would take pills, kill herself\". Also had been driving until she ran out of gas, Friday went to [facility], it took her hours longer. Patient driving from 4 PM until 1 am driving from [facility] to home. HE does think there was brain imaging done at [facility]. Per , Dr. Randy Tapia told him that patient may need to get [facility] ultimately for outpatient follow up. Call placed to Dr. Barbara Becker office at the number left [phone]. Request for call back. On interview today, patient continues to be labile. Is tearful. Says \"I'm getting a divorce over what he has done here\". Says she does trust Dr. Randy Tapia, \"Dr. Randy Tapia says I don't have Bipolar Disorder, she says I'm just anxious, I don't need to be here\". Says \"If I have to be here you better give me some good drugs to sleep\". She discusses anger towards her daughter and others. Explains she ran out of gas \"because its a new car and I didn't know how to tell it was on empty\". Appetite:  [x] Normal/Unchanged  [] Increased  [] Decreased    Sleep:   Feels sleep was worse last night.  Denies nightmares   Suicidal ideation: [] Present  [x] Absent   Plan []Present  [] Absent  [x]  N/A  Homicidal ideation:  []Present  [x] Absent  Energy:    [] Normal/Unchanged  [] Increased  [x] Decreased     Patient is [] able  [x]  unable to CONTRACT FOR SAFETY   Aggression:  [] yes  [x]  no  Medication side effects(SE):  None     Examination:  BP (!) 154/85 Comment: pt requested a recheck  Pulse 82   Temp 94.4 °F (34.7 °C) (Oral)   Resp 18   Ht 5' 7\" (1.702 m)   Wt 160 lb (72.6 kg)   SpO2 99%   BMI 25.06 kg/m²     Appearance:casual, in hospital attire   alert, cooperative  Speech    spontaneous and normal rate  Mood    \"angry\"   Affect    agitation  Thought Content    excessive preoccupations denies auditory or visual hallucinations, not responding to internal stimuli    Thought Process    circumstantial  Associations    logical connections  Insight    limited  Judgment    Impaired  Orientation    oriented to person, place, time, and general circumstances  Memory    recent and remote memory intact  Attention/Concentration    intact          BEHAVIORAL HEALTH FOLLOW-UP NOTE II    7/21/2021    PAST MEDICAL/PSYCHIATRIC HISTORY:   Past Medical History:   Diagnosis Date    Anxiety     Cancer ([facility] 05/10/2017    DCIS left breast    Depression     Manic depression ([facility]     Migraines     Neuropathy     Thyroid disease        FAMILY/SOCIAL HISTORY:  Family History   Problem Relation Age of Onset    High Blood Pressure Mother     High Cholesterol Mother     Cancer Father         brain cancer    Stroke Father     Other Father         ulcerative colitis    High Blood Pressure Father     Cancer Sister         uterine cancer    High Blood Pressure Sister     High Blood Pressure Brother      Social History     Socioeconomic History    Marital status:      Spouse name: Not on file    Number of children: Not on file    Years of education: Not on file    Highest education level: Not on file   Occupational History    Not on file   Tobacco Use    Smoking status: Current Every Day Smoker     Packs/day: 1.00     Years: 13.00     Pack years: 13.00 Types: Cigarettes    Smokeless tobacco: Never Used    Tobacco comment: start age 10   Vaping Use    Vaping Use: Never used   Substance and Sexual Activity    Alcohol use: Yes     Comment: occasional     Drug use: No    Sexual activity: Yes   Other Topics Concern    Not on file   Social History Narrative    Not on file     Social Determinants of Health     Financial Resource Strain:     Difficulty of Paying Living Expenses:    Food Insecurity:     Worried About Running Out of Food in the Last Year:     [address] in the Last Year:    Transportation Needs:     Lack of Transportation (Medical):  Lack of Transportation (Non-Medical):    Physical Activity:     Days of Exercise per Week:     Minutes of Exercise per Session:    Stress:     Feeling of Stress :    Social Connections:     Frequency of Communication with Friends and Family:     Frequency of Social Gatherings with Friends and Family:     Attends Hoahaoism Services:     Active Member of Clubs or Organizations:     Attends Club or Organization Meetings:     Marital Status:    Intimate Partner Violence:     Fear of Current or Ex-Partner:     Emotionally Abused:     Physically Abused:     Sexually Abused:        LABS:  Lab Results   Component Value Date     07/19/2021    BUN 9 07/19/2021    CREATININE 0.82 07/19/2021    TSH 2.060 07/19/2021    WBC 5.7 07/19/2021     No results found for: PHENYTOIN, PHENOBARB, VALPROATE, CBMZ  Lab Results   Component Value Date    INR 1.0 01/28/2016    PROTIME 9.3 (L) 01/28/2016     Lab Results   Component Value Date    APTT 25.0 01/28/2016     Recent Labs     07/19/21 2012   ETOH <10     [unfilled]      ROS:  [x]  All negative/unchanged except if checked.  Explain positive(checked items) below:  [] Constitutional  [] Eyes  [] Ear/Nose/Mouth/Throat  [] Respiratory  [] CV  [] GI  []   [] Musculoskeletal  [] Skin/Breast  [] Neurological  [] Endocrine  [] Heme/Lymph  [] Allergic/Immunologic    Explanation:     MEDICATIONS:    Current Facility-Administered Medications:     vitamin D (CHOLECALCIFEROL) tablet 2,000 Units, 2,000 Units, Oral, Daily, IDALMIS Watts - NP, 2,000 Units at 07/21/21 1228    SUMAtriptan (IMITREX) tablet 100 mg, 100 mg, Oral, Daily PRN, Deon Dumont APRN - NP    triamterene-hydroCHLOROthiazide (MAXZIDE-25) 37.5-25 MG per tablet 1 tablet, 1 tablet, Oral, Daily, IDALMIS Watts - NP, 1 tablet at 07/21/21 1228    levothyroxine (SYNTHROID) tablet 50 mcg, 50 mcg, Oral, Daily, IDALMIS Watts - NP, 50 mcg at 07/21/21 1228    anastrozole (ARIMIDEX) tablet 1 mg, 1 mg, Oral, Daily, Nikhil Winter PA-C, 1 mg at 07/21/21 1228    haloperidol lactate (HALDOL) injection 5 mg, 5 mg, Intramuscular, Q6H PRN **OR** haloperidol (HALDOL) tablet 5 mg, 5 mg, Oral, Q6H PRN, Marcela Hinton MD    hydrOXYzine (VISTARIL) capsule 50 mg, 50 mg, Oral, Q6H PRN **OR** hydrOXYzine (VISTARIL) injection 50 mg, 50 mg, Intramuscular, Q6H PRN, Marcela Hinton MD    acetaminophen (TYLENOL) tablet 650 mg, 650 mg, Oral, Q4H PRN, Marcela Hinton MD, 650 mg at 07/21/21 0358    traZODone (DESYREL) tablet 50 mg, 50 mg, Oral, Nightly PRN, Marcela Hinton MD    benztropine mesylate (COGENTIN) injection 2 mg, 2 mg, Intramuscular, BID PRN, Marcela Hinton MD    magnesium hydroxide (MILK OF MAGNESIA) 400 MG/5ML suspension 30 mL, 30 mL, Oral, Daily PRN, Marcela Hinton MD    nicotine (NICODERM CQ) 21 MG/24HR 1 patch, 1 patch, Transdermal, Daily, Marcela Hinton MD, 1 patch at 07/21/21 3032    gabapentin (NEURONTIN) capsule 300 mg, 300 mg, Oral, TID, Marcela Hinton MD, 300 mg at 07/21/21 5807    LORazepam (ATIVAN) tablet 2 mg, 2 mg, Oral, QPM, Marcela Hinton MD, 2 mg at 07/20/21 2047    LORazepam (ATIVAN) tablet 1 mg, 1 mg, Oral, QAM, Marcela Hinton MD, 1 mg at 07/21/21 0939    LORazepam (ATIVAN) tablet 1 mg, 1 mg, Oral, Lunch, Jayy De La Garza MD, 1 mg at 07/21/21 3275    PSYCHOTHERAPY/COUNSELING:  [x] Therapeutic interview  [x] Supportive  [] CBT  [x] Ongoing  [] Other    ASSESSMENT:  Patient continues to have symptoms of derrick, irritability with mildly pressured speech, labile affect, and circumstantiality. Overall, her mood is unchanged. Will pursue records from outside hospital, patient has agreed to provide her signature for a release request    Collaborate with Dr. Ervin Caballero, outpatient psychiatrist regarding her care   Will continue to pursue evaluation to rule out Picks disease (frontotemporal dementia), anti-NMDA receptor encephalitis, and other medical etioloies to the sudden change in patient's mood over the past several months. The history of getting lost and forgetting how to get home, as well as some confusion may suggest a neurocognitive diagnosis over Bipolar Disorder. However, although she had not been definitively diagnosed with Bipolar Disorder disorder in the chart, it does seem she has tried a multitude of medications most commonly used as mood stabilizers, so the diagnosis may have been suspected long before this. In that case, the current presentation likely represents decompensated psychiatric illness, and biologic etiologies are lower on the differential. History provided by Dr. Ervin Caballero as well as the outside hospital record which will indicate what medical testing has already been performed would be very helpful. Diagnosis: Derrick, Bipolar 1 Disorder   R/O  Picks disease (frontotemporal dementia), anti-NMDA receptor encephalitis       [x] Patient continues to need, on a daily basis, active treatment furnished directly by or     requiring the supervision of inpatient psychiatric personnel     Diagnosis:  Active Problems:    Bipolar 1 disorder ([facility]  Resolved Problems:    * No resolved hospital problems.  *      Treatment Plan:  [x] Continue Current Medications  [x] Continue Follow-up  [x] Continue Labs  [x] Risks, benefits, side effects, drug-to-drug interactions and alternatives to treatment were discussed in my usual manner.     Current medications:     Current Facility-Administered Medications:     vitamin D (CHOLECALCIFEROL) tablet 2,000 Units, 2,000 Units, Oral, Daily, IDALMIS Perez - NP, 2,000 Units at 07/21/21 1228    SUMAtriptan (IMITREX) tablet 100 mg, 100 mg, Oral, Daily PRN, IDALMIS Perez - JAGDISH    triamterene-hydroCHLOROthiazide (MAXZIDE-25) 37.5-25 MG per tablet 1 tablet, 1 tablet, Oral, Daily, IDALMIS Perez - NP, 1 tablet at 07/21/21 1228    levothyroxine (SYNTHROID) tablet 50 mcg, 50 mcg, Oral, Daily, IDALMIS Perez - NP, 50 mcg at 07/21/21 1228    anastrozole (ARIMIDEX) tablet 1 mg, 1 mg, Oral, Daily, Richa Winter PA-C, 1 mg at 07/21/21 1228    haloperidol lactate (HALDOL) injection 5 mg, 5 mg, Intramuscular, Q6H PRN **OR** haloperidol (HALDOL) tablet 5 mg, 5 mg, Oral, Q6H PRN, Kala Bowie MD    hydrOXYzine (VISTARIL) capsule 50 mg, 50 mg, Oral, Q6H PRN **OR** hydrOXYzine (VISTARIL) injection 50 mg, 50 mg, Intramuscular, Q6H PRN, Kala Bowie MD    acetaminophen (TYLENOL) tablet 650 mg, 650 mg, Oral, Q4H PRN, Kala Bowie MD, 650 mg at 07/21/21 0358    traZODone (DESYREL) tablet 50 mg, 50 mg, Oral, Nightly PRN, Kala Bowie MD    benztropine mesylate (COGENTIN) injection 2 mg, 2 mg, Intramuscular, BID PRN, Kala Bowie MD    magnesium hydroxide (MILK OF MAGNESIA) 400 MG/5ML suspension 30 mL, 30 mL, Oral, Daily PRN, Kala Bowie MD    nicotine (NICODERM CQ) 21 MG/24HR 1 patch, 1 patch, Transdermal, Daily, Kala Bowie MD, 1 patch at 07/21/21 8387    gabapentin (NEURONTIN) capsule 300 mg, 300 mg, Oral, TID, Kala Bowie MD, 300 mg at 07/21/21 0673    LORazepam (ATIVAN) tablet 2 mg, 2 mg, Oral, QPM, Kala Bowie MD, 2 mg at 07/20/21 2047    LORazepam (ATIVAN) tablet 1 mg, 1 mg, Oral, QAM, Lindsey Garza MD, 1 mg at 07/21/21 4446    LORazepam (ATIVAN) tablet 1 mg, 1 mg, Oral, Lunch, Lindsey Garza MD, 1 mg at 07/21/21 9740    ASSESSMENT/PLAN: NO CHANGES TO MEDICATIONS: Patient doing well on current medication regimen which is being titrated under the supervision of the physician. At this time, while patient continues to exhibit symptoms, we will not change medications and will continue to monitor for continued improvement. No acute concerns voiced. Qtc 436     Request records from inpatient admission to [facility]   Patient may warrant further medical work-up, she did reportedly have head imaging done at [facility]    Continue lorazepam (Ativan)  Continue gabapentin (Neurontin)       She has taken Prozac, Paxil (jittery), Zoloft, Celexa, Lexapro, Effexor, Pristiq (worked but cant afford), Wellbutrin (anxious), Remeron, Pamelor, Elavil, Ativan, Ambien, Klonopin , Zyprexa, Abilify (unsure she ever tried it), Seroquel, (worked but gained 50 lbs), Topamax (angry, panic attacks) , Risperdal (\"on speed\"), Trazodone (slee paralysis), Buspar (ineffective), Perphenazine, Cymbalta.  Saw Dotty Mathias    Reason for more than one antipsychotic:  [x] N/A   [] 3 failed monotherapy(drugs tried):  [] Cross over to a new antipsychotic  [] Taper to monotherapy from polypharmacy  [] Augmentation of Clozapine therapy due to treatment resistance to single therapy      Electronically signed by Jena Riley MD on 7/21/2021 at 3:24 PM

## 2021-07-21 NOTE — CARE COORDINATION
Pt up to the nurses station often. Tearful and talkative. Labile mood. Asking to speak to her . Dr. Jackson Maria currently speaking to her . Encouraged pt to wait and see what the outcome is. Pt agreed. Pt preoccupied with discharge. Pressured speech with FOI.

## 2021-07-21 NOTE — GROUP NOTE
Group Therapy Note    Date: 7/21/2021    Group Start Time: 1000  Group End Time: 1100  Group Topic: Psychoeducation    [facility]    Luisa Aranaper, CTRS        Group Therapy Note    Attendees: 5         Patient's Goal:  \"to get my meds straight\"    Notes:  Pt. attended the 1000 skill group. Pt. was easily distracted. Indecisive but once settled, worked on project with interest. Engaged in the group discussion. Status After Intervention:  Improved    Participation Level:  Active Listener and Interactive    Participation Quality: Appropriate, Attentive and Sharing      Speech:  normal      Thought Process/Content: Logical      Affective Functioning: Flat      Mood: depressed      Level of consciousness:  Alert, Oriented x4 and Attentive      Response to Learning: Able to change behavior and Progressing to goal      Endings: None Reported    Modes of Intervention: Education, Support, Socialization and Activity      Discipline Responsible: Psychoeducational Specialist      Signature:  Ector Cleveland

## 2021-07-21 NOTE — GROUP NOTE
Group Therapy Note    Date: 7/21/2021    Group Start Time: 0100  Group End Time: 0140  Group Topic: Cognitive Skills    [facility]    KARIN Barrientos        Group Therapy Note    Attendees: 6/9         Patient's Goal:  \"to find more support\"    Notes:  Upset with her family     Status After Intervention:  Decompensated    Participation Level: Monopolizing    Participation Quality: Inappropriate and Intrusive      Speech:  pressured      Thought Process/Content: Flight of ideas      Affective Functioning: Exaggerated      Mood: anxious      Level of consciousness:  Preoccupied      Response to Learning: Progressing to goal      Endings: None Reported    Modes of Intervention: Education      Discipline Responsible: /Counselor      Signature:  KARIN Barrientos

## 2021-07-21 NOTE — GROUP NOTE
Group Therapy Note    Date: 7/21/2021    Group Start Time: 1630  Group End Time: 1700  Group Topic: Healthy Living/Wellness    [facility]    Anabela Tong        Group Therapy Note    Attendees: 4/7         Patient's Goal:  To participate in a puzzle activity and to discuss healthy living topics    Notes:  Patient actively participated and joined in group discussion    Status After Intervention:  Improved    Participation Level:  Active Listener and Interactive    Participation Quality: Appropriate, Attentive, Sharing and Supportive      Speech:  normal      Thought Process/Content: Logical      Affective Functioning: Congruent      Mood: euthymic      Level of consciousness:  Alert and Attentive      Response to Learning: Able to verbalize current knowledge/experience, Able to verbalize/acknowledge new learning and Progressing to goal      Endings: None Reported    Modes of Intervention: Education      Discipline Responsible: [facility] [address] U-NOTE Ramona Bevalley Tech      Signature:  Anabela Tong

## 2021-07-21 NOTE — SUICIDE SAFETY PLAN
SAFETY PLAN    A suicide Safety Plan is a document that supports someone when they are having thoughts of suicide. Warning Signs that indicate a suicidal crisis may be developing: What (situations, thoughts, feelings, body sensations, behaviors, etc.) do you experience that lets you know you are beginning to think about suicide? 1. Anxiety    Internal Coping Strategies:  What things can I do (relaxation techniques, hobbies, physical activities, etc.) to take my mind off my problems without contacting another person? 1. Working  2. Exercise  3. [address] [facility] and social settings that provide distraction: Who can I call or where can I go to distract me? 1. Name: Joseph Barrera Phone: [phone]  2. Name: Rhiannon Templeton Phone: in phone   3. Place: [address]        4. Place: Out in the yard    People whom I can ask for help: Who can I call when I need help - for example, friends, family, clergy, someone else? 1. Name: Inder Elder          Phone: in phone  2. Name: Joseph Barrera Phone: [phone]  5. Name: Rhiannon Templeton Phone: in phone    Professionals or [address] [place] [address] I can contact during a crisis: Who can I call for help - for example, my doctor, my psychiatrist, my psychologist, a mental health provider, a suicide hotline? 1. Clinician Name: Dr. Elizabeth Cotto   Phone: in phone      Clinician Pager or Emergency Contact #: [phone]    1. Suicide Prevention Lifeline: 8-249-949-TALK (2978)    3. [address] [place] [address] Emergency Services -  for example, [facility] suicide hotline, [facility]: 856      Emergency Services Address: [address] [place] [address]      Emergency Services Phone: [phone]    Making the environment safe: How can I make my environment (house/apartment/living space) safer? For example, can I remove guns, medications, and other items? 1. Patient does not have a weapon at the discharge location. 2. Patient stated she feels safe living with her .

## 2021-07-21 NOTE — PROGRESS NOTES
Pt. attended the 0900 community meeting.  Electronically signed by Ebony Campo on 7/21/2021 at 9:14 AM

## 2021-07-21 NOTE — GROUP NOTE
Group Therapy Note    Date: 7/21/2021    Group Start Time: 1100  Group End Time: 1130  Group Topic: Group Therapy    [facility]    ERNST Stern        Group Therapy Note    Attendees: 5         Patient's Goal:  To participate in a goal oriented group. Notes:  Patient stated her goal is to go home and be with her grandchild. Status After Intervention:  Unchanged    Participation Level: Active Listener and Interactive    Participation Quality: Appropriate and Attentive      Speech:  normal      Thought Process/Content: Logical      Affective Functioning: Congruent      Mood: anxious and elevated      Level of consciousness:  Alert      Response to Learning: Able to verbalize current knowledge/experience      Endings: None Reported    Modes of Intervention: Education      Discipline Responsible: /Counselor      Signature:   ERNST Stern

## 2021-07-21 NOTE — CARE COORDINATION
Pt out on unit and preoccupied with discharge. Denies all. Denies depression and anxiety with Ativan dose. Reports good sleep and appetite. Pressured speech noted with FOI. Pt denies need to be here.

## 2021-07-21 NOTE — GROUP NOTE
Group Therapy Note    Date: 7/21/2021    Group Start Time: 1400  Group End Time: 1430  Group Topic: Group Therapy    [facility]    Elier Kern RN        Group Therapy Note    Attendees: 5/7       Patient's Goal:  serenity    Notes:      Status After Intervention:  Unchanged    Participation Level:  Active Listener    Participation Quality: Appropriate      Speech:  normal      Thought Process/Content: Logical      Affective Functioning: Congruent      Mood: euthymic      Level of consciousness:  Alert      Response to Learning: Progressing to goal      Endings: None Reported    Modes of Intervention: Education      Discipline Responsible: Registered Nurse      Signature:  Elier Kern RN

## 2021-07-22 ENCOUNTER — APPOINTMENT (OUTPATIENT)
Dept: MRI IMAGING | Age: 61
DRG: 885 | End: 2021-07-22
Payer: COMMERCIAL

## 2021-07-22 LAB
FOLATE: 15.8 NG/ML (ref 7.3–26.1)
VITAMIN B-12: 547 PG/ML (ref 232–1245)

## 2021-07-22 PROCEDURE — 6370000000 HC RX 637 (ALT 250 FOR IP): Performed by: NURSE PRACTITIONER

## 2021-07-22 PROCEDURE — 70551 MRI BRAIN STEM W/O DYE: CPT

## 2021-07-22 PROCEDURE — 1240000000 HC EMOTIONAL WELLNESS R&B

## 2021-07-22 PROCEDURE — 99232 SBSQ HOSP IP/OBS MODERATE 35: CPT | Performed by: PSYCHIATRY & NEUROLOGY

## 2021-07-22 PROCEDURE — 6370000000 HC RX 637 (ALT 250 FOR IP): Performed by: PHYSICIAN ASSISTANT

## 2021-07-22 PROCEDURE — 82746 ASSAY OF FOLIC ACID SERUM: CPT

## 2021-07-22 PROCEDURE — 99254 IP/OBS CNSLTJ NEW/EST MOD 60: CPT | Performed by: NURSE PRACTITIONER

## 2021-07-22 PROCEDURE — 82607 VITAMIN B-12: CPT

## 2021-07-22 PROCEDURE — 36415 COLL VENOUS BLD VENIPUNCTURE: CPT

## 2021-07-22 PROCEDURE — 6370000000 HC RX 637 (ALT 250 FOR IP): Performed by: PSYCHIATRY & NEUROLOGY

## 2021-07-22 RX ORDER — IBUPROFEN 600 MG/1
600 TABLET ORAL EVERY 8 HOURS PRN
Status: COMPLETED | OUTPATIENT
Start: 2021-07-22 | End: 2021-07-23

## 2021-07-22 RX ADMIN — ACETAMINOPHEN 650 MG: 325 TABLET ORAL at 09:13

## 2021-07-22 RX ADMIN — ACETAMINOPHEN 650 MG: 325 TABLET ORAL at 15:12

## 2021-07-22 RX ADMIN — GABAPENTIN 300 MG: 300 CAPSULE ORAL at 13:36

## 2021-07-22 RX ADMIN — ANASTROZOLE 1 MG: 1 TABLET, COATED ORAL at 10:37

## 2021-07-22 RX ADMIN — LORAZEPAM 2 MG: 1 TABLET ORAL at 17:39

## 2021-07-22 RX ADMIN — Medication 2000 UNITS: at 09:12

## 2021-07-22 RX ADMIN — LEVOTHYROXINE SODIUM 50 MCG: 0.05 TABLET ORAL at 06:52

## 2021-07-22 RX ADMIN — LURASIDONE HYDROCHLORIDE 20 MG: 20 TABLET, FILM COATED ORAL at 17:39

## 2021-07-22 RX ADMIN — TRAZODONE HYDROCHLORIDE 50 MG: 50 TABLET ORAL at 21:00

## 2021-07-22 RX ADMIN — HYDROXYZINE PAMOATE 50 MG: 50 CAPSULE ORAL at 15:12

## 2021-07-22 RX ADMIN — LORAZEPAM 1 MG: 1 TABLET ORAL at 12:27

## 2021-07-22 RX ADMIN — TRIAMTERENE AND HYDROCHLOROTHIAZIDE 1 TABLET: 37.5; 25 TABLET ORAL at 09:13

## 2021-07-22 RX ADMIN — LORAZEPAM 1 MG: 1 TABLET ORAL at 09:13

## 2021-07-22 RX ADMIN — GABAPENTIN 300 MG: 300 CAPSULE ORAL at 21:00

## 2021-07-22 RX ADMIN — IBUPROFEN 600 MG: 600 TABLET ORAL at 21:00

## 2021-07-22 ASSESSMENT — ENCOUNTER SYMPTOMS
TROUBLE SWALLOWING: 0
SHORTNESS OF BREATH: 0
ABDOMINAL PAIN: 0
VOMITING: 0
COLOR CHANGE: 0
ABDOMINAL DISTENTION: 0
WHEEZING: 0
COUGH: 0
NAUSEA: 0
CHEST TIGHTNESS: 0

## 2021-07-22 ASSESSMENT — PAIN SCALES - GENERAL
PAINLEVEL_OUTOF10: 8
PAINLEVEL_OUTOF10: 3
PAINLEVEL_OUTOF10: 2
PAINLEVEL_OUTOF10: 3

## 2021-07-22 NOTE — CONSULTS
impulsive which they report is new behavior. She has been aggressive and agitated and violent with her daughter. She has had difficulty driving and getting lost and driving for hours on and until she runs out of gas. She has been disorganized. She had difficulty cooking and following recipes although she denies this. Patient's also not been sleeping. Does have a long psych history but no documented bipolar disorder. She is currently tangential with pressured speech and hyperactive. No gait ataxia or falls. No headache or vision changes. No tremors or hallucinations. No sexually inappropriate behavior. Able to perform all of her own ADLs such as bathing and dressing. No issues with aphasia or speech. No issues with word finding.   Lab/diagnostic testing: Urinalysis negative, Urine tox screen negative, COVID-19 negative, acetaminophen level less than 5, WBCs 5.7, hemoglobin 12.7, hematocrit 37.2, platelets 510, , sodium 136, potassium 3.7, chloride 101, CO2 26, glucose 95, BUN 9, creatinine 0.82, calcium 9.3, alk phos 54, ALT 18, AST 23, ethanol less than 10, total cholesterol 162, triglycerides 124, HDL 63, LDL 74, TSH 2.684, salicylate less than 0.3,  Vitals:    07/22/21 0838   BP: 135/83   Pulse: 83   Resp: 16   Temp: 98.4 °F (36.9 °C)   SpO2: 95%     Family History   Problem Relation Age of Onset    High Blood Pressure Mother     High Cholesterol Mother     Cancer Father         brain cancer    Stroke Father     Other Father         ulcerative colitis    High Blood Pressure Father     Cancer Sister         uterine cancer    High Blood Pressure Sister     High Blood Pressure Brother      Social History     Socioeconomic History    Marital status:      Spouse name: Not on file    Number of children: Not on file    Years of education: Not on file    Highest education level: Not on file   Occupational History    Not on file   Tobacco Use    Smoking status: Current Every Day Smoker     Packs/day: 1.00     Years: 13.00     Pack years: 13.00     Types: Cigarettes    Smokeless tobacco: Never Used    Tobacco comment: start age 10   Vaping Use    Vaping Use: Never used   Substance and Sexual Activity    Alcohol use: Yes     Comment: occasional     Drug use: No    Sexual activity: Yes   Other Topics Concern    Not on file   Social History Narrative    Not on file     Social Determinants of Health     Financial Resource Strain:     Difficulty of Paying Living Expenses:    Food Insecurity:     Worried About Running Out of Food in the Last Year:     Ran Out of Food in the Last Year:    Transportation Needs:     Lack of Transportation (Medical):  Lack of Transportation (Non-Medical):    Physical Activity:     Days of Exercise per Week:     Minutes of Exercise per Session:    Stress:     Feeling of Stress :    Social Connections:     Frequency of Communication with Friends and Family:     Frequency of Social Gatherings with Friends and Family:     Attends Denominational Services:     Active Member of Clubs or Organizations:     Attends Club or Organization Meetings:     Marital Status:    Intimate Partner Violence:     Fear of Current or Ex-Partner:     Emotionally Abused:     Physically Abused:     Sexually Abused:         Review of Systems   Constitutional: Negative for activity change, appetite change, chills, fatigue, fever and unexpected weight change. HENT: Negative for hearing loss and trouble swallowing. Eyes: Negative for visual disturbance. Respiratory: Negative for cough, chest tightness, shortness of breath and wheezing. Cardiovascular: Negative for chest pain, palpitations and leg swelling. Gastrointestinal: Negative for abdominal distention, abdominal pain, nausea and vomiting. Genitourinary: Negative for difficulty urinating and dysuria. Musculoskeletal: Negative for gait problem, neck pain and neck stiffness.    Skin: Negative for color change and rash. Neurological: Negative for dizziness, tremors, seizures, syncope, facial asymmetry, speech difficulty, weakness, light-headedness, numbness and headaches. Psychiatric/Behavioral: Positive for agitation, behavioral problems, confusion, decreased concentration and sleep disturbance. Negative for hallucinations. The patient is nervous/anxious and is hyperactive. Physical Exam  Vitals and nursing note reviewed. Constitutional:       General: She is not in acute distress. Appearance: She is not ill-appearing or diaphoretic. HENT:      Head: Normocephalic and atraumatic. Eyes:      General: No visual field deficit. Extraocular Movements: Extraocular movements intact. Pupils: Pupils are equal, round, and reactive to light. Cardiovascular:      Rate and Rhythm: Normal rate and regular rhythm. Pulmonary:      Effort: Pulmonary effort is normal. No respiratory distress. Breath sounds: Normal breath sounds. Abdominal:      General: Bowel sounds are normal.      Palpations: Abdomen is soft. Skin:     General: Skin is warm and dry. Neurological:      General: No focal deficit present. Mental Status: She is alert and oriented to person, place, and time. Cranial Nerves: No cranial nerve deficit, dysarthria or facial asymmetry. Sensory: No sensory deficit. Motor: No weakness, tremor, atrophy, abnormal muscle tone, seizure activity or pronator drift. Coordination: Romberg sign negative.  Coordination normal. Finger-Nose-Finger Test normal.      Gait: Gait normal.      Deep Tendon Reflexes: Reflexes normal.               Medications:  Reviewed    Infusion Medications:   Scheduled Medications:    Vitamin D  2,000 Units Oral Daily    triamterene-hydroCHLOROthiazide  1 tablet Oral Daily    levothyroxine  50 mcg Oral Daily    QUEtiapine  100 mg Oral Nightly    anastrozole  1 mg Oral Daily    nicotine  1 patch Transdermal Daily    gabapentin  300 mg Oral TID    LORazepam  2 mg Oral QPM    LORazepam  1 mg Oral QAM    LORazepam  1 mg Oral Lunch     PRN Meds: SUMAtriptan, haloperidol lactate **OR** haloperidol, hydrOXYzine **OR** hydrOXYzine, acetaminophen, traZODone, benztropine mesylate, magnesium hydroxide    Labs:   Recent Labs     07/19/21 2012   WBC 5.7   HGB 12.7   HCT 37.2        Recent Labs     07/19/21 2012      K 3.7      CO2 26   BUN 9   CREATININE 0.82   CALCIUM 9.3     Recent Labs     07/19/21 2012   AST 23   ALT 18   BILITOT <0.2   ALKPHOS 54     No results for input(s): INR in the last 72 hours. Recent Labs     07/19/21 2012   CKTOTAL 232*       Urinalysis:   Lab Results   Component Value Date    NITRU Negative 07/19/2021    WBCUA 20-50 07/19/2021    BACTERIA Negative 07/19/2021    RBCUA 0-2 07/19/2021    BLOODU Negative 07/19/2021    SPECGRAV 1.013 07/19/2021    GLUCOSEU Negative 07/19/2021    GLUCOSEU NEG 04/05/2012       Radiology:   Most recent    EEG No valid procedures specified. MRI of Brain No results found for this or any previous visit. No results found for this or any previous visit. MRA of the Head and Neck: No results found for this or any previous visit. No results found for this or any previous visit. No results found for this or any previous visit. CT of the Head: No results found for this or any previous visit. No results found for this or any previous visit. No results found for this or any previous visit. Carotid duplex: No results found for this or any previous visit. No results found for this or any previous visit. No results found for this or any previous visit. Echo No results found for this or any previous visit. Assessment/Plan:  Bipolar disorder currently in inpatient behavioral health unit. Patient has been initiated on [place]. There is concern from psychiatry for frontotemporal dementia.   Will obtain work-up with MRI of the brain and EEG. Will assess B12 and folate and RPR. No metabolic disturbances noted on Laboratory testing. Thyroid studies and normal range. She will need MMSE done tomorrow and further outpatient neuropsychiatric testing. She does report that she recently stopped her Cymbalta approximately 3 months ago and behavior started shortly after that according to family. Further recommendations to follow pending work-up.         Collaborating physicians: Dr Zaid Nava    Electronically signed by IDALMIS Frazier CNP on 7/22/2021 at 12:25 PM

## 2021-07-22 NOTE — PROGRESS NOTES
Pt out in day room social with staff and peers, pt reports she is upset she is not going home today, pt noted to be on the phone, preoccupied with her  taking care of her dog. Pt denies SI,HI,AVH, pt denies any anxiety and depression, pt reports she has no issues and does not need to be here.

## 2021-07-22 NOTE — PROGRESS NOTES
Pt asks for her Tramadol that she takes and was told she could have. Patient assured that she does not have this available but does have Ativan. Patient is given Vistaril and requested tylenol for a headache and for breakthrough anxiety. Patient does go to the spirituality group talking to this nurse as she is going into the group.  Electronically signed by Tete Ryan LPN on 5/77/2884 at 9:59 PM

## 2021-07-22 NOTE — GROUP NOTE
Group Therapy Note    Date: 7/22/2021    Group Start Time: 0200  Group End Time: 4741  Group Topic: Cognitive Skills    [facility]    KARIN Stephen        Group Therapy Note    Attendees: 6/7         Patient's Goal:  To participate in recreational activities and practice social skills with peers experiencing similar situations.      Notes:  Pt is discharged focused    Status After Intervention:  Unchanged    Participation Level: Monopolizing    Participation Quality: Intrusive      Speech:  pressured      Thought Process/Content: Flight of ideas      Affective Functioning: Exaggerated      Mood: elevated      Level of consciousness:  Preoccupied      Response to Learning: Progressing to goal      Endings: None Reported    Modes of Intervention: Education      Discipline Responsible: /Counselor      Signature:  KARIN Stephen

## 2021-07-22 NOTE — CARE COORDINATION
Writer spoke with pt's  Floyd Beckham. Informed him that pt signed voluntary. Informed him of med changes and MD speaking with Dr. Peggy Mitchell. Family is happy that she signed voluntary and did not need to be probated. Will update  tomorrow.  Electronically signed by KARIN Aponte on 7/22/2021 at 3:05 PM

## 2021-07-22 NOTE — GROUP NOTE
Group Therapy Note    Date: 7/22/2021    Group Start Time: 1110  Group End Time: 1150  Group Topic: Psychotherapy    [facility]    KARIN Sloan        Group Therapy Note    Attendees: 6/10         Patient's Goal:  \"to leave today\"    Notes:  Heavily focused on discharging today     Status After Intervention:  Unchanged    Participation Level: Monopolizing    Participation Quality: Intrusive      Speech:  pressured      Thought Process/Content: Flight of ideas      Affective Functioning: Exaggerated      Mood: elevated      Level of consciousness:  Alert      Response to Learning: Progressing to goal      Endings: None Reported    Modes of Intervention: Education      Discipline Responsible: /Counselor      Signature:  KARIN Sloan

## 2021-07-22 NOTE — PROGRESS NOTES
Spiritual Support Group Note    Number of Participants in Group: 2                       Time: 15:00-15:45    Goal: Relief from isolation and loneliness             Heide Sharing             Self-understanding and gain insight              Acceptance and belonging            Recognize they are not alone                Socialization             Empowerment       Encouragement    Topic:  [] Spiritual Wellness and Self Care                  [] Hope                     [x] Connecting with Divine/Others        [] Thankfulness and Gratitude               [x]  Meaningfulness and Purpose               [] Forgiveness               [] Peace               [] Connect to [employer]      [] Other    Participation Level:   [x] Active Listener   [] Minimal   [] Monopolizing   [] Interactive   [] No Participation   []  Other:     Attention:   [x] Alert   [] Distractible   [] Drowsy   [] Poor   [] Other:    [place]:   [x] Cooperative   [] Suspicious   [] Withdrawn   [] Guarded   [] Irritable   [] Inhospitable   [] Other:     Others Comments from Group:

## 2021-07-22 NOTE — GROUP NOTE
Group Therapy Note    Date: 7/22/2021    Group Start Time: 5374  Group End Time: 1700  Group Topic: Healthy Living/Wellness    [facility]    Vilma Soni        Group Therapy Note    Attendees: 4/8       Patient's Goal:  To participate in a game to learn about coping skills    Notes:  Patient actively participated in group. Patient over shared about her personal life, interrupted others, dominated the conversation, and got the group off topic. Thoughts appeared disorganized.     Status After Intervention:  Decompensated    Participation Level: Monopolizing    Participation Quality: Sharing, Inappropriate and Intrusive      Speech:  pressured      Thought Process/Content: Perseverating      Affective Functioning: Exaggerated      Mood: angry, elevated and irritable      Level of consciousness:  Alert and Preoccupied      Response to Learning: Resistant      Endings: None Reported    Modes of Intervention: Education      Discipline Responsible: [address], Leondra music      Signature:  Vilma Soni

## 2021-07-22 NOTE — PROGRESS NOTES
Pt asks for tylenol for pain with morning meds. Pt given for 3-4 left shoulder pain. Pt will be monitored.

## 2021-07-22 NOTE — PROGRESS NOTES
Pt. attended the 0900 community meeting.  Electronically signed by Magdaline Number on 7/22/2021 at 9:54 AM

## 2021-07-22 NOTE — GROUP NOTE
Group Therapy Note    Date: 7/22/2021    Group Start Time: 1000  Group End Time: 1100  Group Topic: Psychoeducation    [facility]    Bertha Moncada, CTRS        Group Therapy Note    Attendees: 6          Patient's Goal:  \"to get out of here\"    Notes:  Pt. attended the 1000 skill group. Continues to blame family for her being here. Tremulous. Worked on project with interest.    Status After Intervention:  Improved    Participation Level:  Active Listener and Interactive    Participation Quality: Appropriate, Attentive and Sharing      Speech:  normal      Thought Process/Content: Logical  Flight of ideas      Affective Functioning: Flat      Mood: depressed and slightly elevated      Level of consciousness:  Alert, Oriented x4 and Attentive      Response to Learning: Progressing to goal      Endings: None Reported    Modes of Intervention: Education, Support, Socialization and Activity      Discipline Responsible: Psychoeducational Specialist      Signature:  Kate Lloyd

## 2021-07-22 NOTE — PROGRESS NOTES
Pt out on the unit social with staff and peers, pt denies any needs to be on the unit, \" my daughter is the one with bipolar, I am not the problem, we usually can only get along a few days at a time\", pt denies [address], denies any anxiety and depression, pt noted to have flight of ideas, pt noted to have irritable edge to her tone. Poor eye contact noted.

## 2021-07-22 NOTE — PROGRESS NOTES
stimuli    Thought Process    linear, coherent and circumstantial  Associations    logical connections  Insight    limited  Judgment    Impaired  Orientation    oriented to person, place, time, and general circumstances  Memory    recent and remote memory intact  Attention/Concentration    intact          BEHAVIORAL HEALTH FOLLOW-UP NOTE II    7/22/2021    PAST MEDICAL/PSYCHIATRIC HISTORY:   Past Medical History:   Diagnosis Date    Anxiety     Cancer ([facility] 05/10/2017    DCIS left breast    Depression     Manic depression ([facility]     Migraines     Neuropathy     Thyroid disease        FAMILY/SOCIAL HISTORY:  Family History   Problem Relation Age of Onset    High Blood Pressure Mother     High Cholesterol Mother     Cancer Father         brain cancer    Stroke Father     Other Father         ulcerative colitis    High Blood Pressure Father     Cancer Sister         uterine cancer    High Blood Pressure Sister     High Blood Pressure Brother      Social History     Socioeconomic History    Marital status:      Spouse name: Not on file    Number of children: Not on file    Years of education: Not on file    Highest education level: Not on file   Occupational History    Not on file   Tobacco Use    Smoking status: Current Every Day Smoker     Packs/day: 1.00     Years: 13.00     Pack years: 13.00     Types: Cigarettes    Smokeless tobacco: Never Used    Tobacco comment: start age 10   Vaping Use    Vaping Use: Never used   Substance and Sexual Activity    Alcohol use: Yes     Comment: occasional     Drug use: No    Sexual activity: Yes   Other Topics Concern    Not on file   Social History Narrative    Not on file     Social Determinants of Health     Financial Resource Strain:     Difficulty of Paying Living Expenses:    Food Insecurity:     Worried About Running Out of Food in the Last Year:     [place] of Food in the Last Year:    Transportation Needs:     Lack of Transportation (Medical):  Lack of Transportation (Non-Medical):    Physical Activity:     Days of Exercise per Week:     Minutes of Exercise per Session:    Stress:     Feeling of Stress :    Social Connections:     Frequency of Communication with Friends and Family:     Frequency of Social Gatherings with Friends and Family:     Attends [employer] Services:     Active Member of Clubs or Organizations:     Attends Club or Organization Meetings:     Marital Status:    Intimate Partner Violence:     Fear of Current or Ex-Partner:     Emotionally Abused:     Physically Abused:     Sexually Abused:        LABS:  Lab Results   Component Value Date     07/19/2021    BUN 9 07/19/2021    CREATININE 0.82 07/19/2021    TSH 2.060 07/19/2021    WBC 5.7 07/19/2021     No results found for: PHENYTOIN, PHENOBARB, VALPROATE, CBMZ  Lab Results   Component Value Date    INR 1.0 01/28/2016    PROTIME 9.3 (L) 01/28/2016     Lab Results   Component Value Date    APTT 25.0 01/28/2016     Recent Labs     07/19/21 2012   ETOH <10     [unfilled]      ROS:  [x] All negative/unchanged except if checked.  Explain positive(checked items) below:  [] Constitutional  [] Eyes  [] Ear/Nose/Mouth/Throat  [] Respiratory  [] CV  [] GI  []   [] Musculoskeletal  [] Skin/Breast  [] Neurological  [] Endocrine  [] Heme/Lymph  [] Allergic/Immunologic    Explanation:     MEDICATIONS:    Current Facility-Administered Medications:     lurasidone (LATUDA) tablet 20 mg, 20 mg, Oral, Dinner, Johanna Granda MD    vitamin D (CHOLECALCIFEROL) tablet 2,000 Units, 2,000 Units, Oral, Daily, IDALMIS Mckenzie NP, 2,000 Units at 07/22/21 0912    SUMAtriptan (IMITREX) tablet 100 mg, 100 mg, Oral, Daily PRN, IDALMIS Mckenzie NP    triamterene-hydroCHLOROthiazide (MAXZIDE-25) 37.5-25 MG per tablet 1 tablet, 1 tablet, Oral, Daily, IDALMIS Mckenzie NP, 1 tablet at 07/22/21 0913    levothyroxine (SYNTHROID) tablet 50 mcg, 50 mcg, Oral, Daily, IDALMIS Lucio NP, 50 mcg at 07/22/21 1891    anastrozole (ARIMIDEX) tablet 1 mg, 1 mg, Oral, Daily, Kevan Winter PA-C, 1 mg at 07/22/21 1037    haloperidol lactate (HALDOL) injection 5 mg, 5 mg, Intramuscular, Q6H PRN **OR** haloperidol (HALDOL) tablet 5 mg, 5 mg, Oral, Q6H PRN, Jan Bliss MD    hydrOXYzine (VISTARIL) capsule 50 mg, 50 mg, Oral, Q6H PRN **OR** hydrOXYzine (VISTARIL) injection 50 mg, 50 mg, Intramuscular, Q6H PRN, Jan Bliss MD    acetaminophen (TYLENOL) tablet 650 mg, 650 mg, Oral, Q4H PRN, Jan Bliss MD, 650 mg at 07/22/21 0913    traZODone (DESYREL) tablet 50 mg, 50 mg, Oral, Nightly PRN, Jan Bliss MD    benztropine mesylate (COGENTIN) injection 2 mg, 2 mg, Intramuscular, BID PRN, Jan Bliss MD    magnesium hydroxide (MILK OF MAGNESIA) 400 MG/5ML suspension 30 mL, 30 mL, Oral, Daily PRN, Jan Bliss MD    nicotine (NICODERM CQ) 21 MG/24HR 1 patch, 1 patch, Transdermal, Daily, Jan Bliss MD, 1 patch at 07/22/21 9705    gabapentin (NEURONTIN) capsule 300 mg, 300 mg, Oral, TID, Jan Bliss MD, 300 mg at 07/22/21 1336    LORazepam (ATIVAN) tablet 2 mg, 2 mg, Oral, QPM, Jan Bliss MD, 2 mg at 07/21/21 1845    LORazepam (ATIVAN) tablet 1 mg, 1 mg, Oral, QAM, Jan Bliss MD, 1 mg at 07/22/21 0913    LORazepam (ATIVAN) tablet 1 mg, 1 mg, Oral, Lunch, Jan Bliss MD, 1 mg at 07/22/21 1227    PSYCHOTHERAPY/COUNSELING:  [x] Therapeutic interview  [x] Supportive  [] CBT  [x] Ongoing  [] Other    ASSESSMENT:  Patient continues to have symptoms of circumstantial, hyperverbose, mood lability. Mood is improving, she is going to switch from quetiapine (Seroquel) to lurasidone (Latuda) due to preferable metabolic profile.      [x] Patient continues to need, on a daily basis, active treatment furnished directly by or     requiring the supervision of inpatient psychiatric personnel     Diagnosis:  Active Problems:    Bipolar 1 disorder ([facility] [address]  Resolved Problems:    * No resolved hospital problems. *    neurologist consult - MRI results pending   lurasidone (Latuda) 20 mg daily start this evening with dinner   Once she is on a more therapeutic dose of the mood stabilizer, will begin to taper lorazepam (Ativan), Dr. Satish Coley does say she had preferred to taper patient off of this which patient is aware of this     Treatment Plan:  [x] Continue Current Medications  [x] Continue Follow-up  [x] Continue Labs  [x] Risks, benefits, side effects, drug-to-drug interactions and alternatives to treatment were discussed in my usual manner.     Current medications:     Current Facility-Administered Medications:     lurasidone (LATUDA) tablet 20 mg, 20 mg, Oral, Dinner, Lindsey Garza MD    vitamin D (CHOLECALCIFEROL) tablet 2,000 Units, 2,000 Units, Oral, Daily, Fayrene Hence, APRN - NP, 2,000 Units at 07/22/21 0912    SUMAtriptan (IMITREX) tablet 100 mg, 100 mg, Oral, Daily PRN, Fayrene Hence, APRN - NP    triamterene-hydroCHLOROthiazide (MAXZIDE-25) 37.5-25 MG per tablet 1 tablet, 1 tablet, Oral, Daily, Fayrene Hence, APRN - NP, 1 tablet at 07/22/21 0913    levothyroxine (SYNTHROID) tablet 50 mcg, 50 mcg, Oral, Daily, Fayrene Hence, APRN - NP, 50 mcg at 07/22/21 1796    anastrozole (ARIMIDEX) tablet 1 mg, 1 mg, Oral, Daily, Cam Winter PA-C, 1 mg at 07/22/21 1037    haloperidol lactate (HALDOL) injection 5 mg, 5 mg, Intramuscular, Q6H PRN **OR** haloperidol (HALDOL) tablet 5 mg, 5 mg, Oral, Q6H PRN, Lindsey Garza MD    hydrOXYzine (VISTARIL) capsule 50 mg, 50 mg, Oral, Q6H PRN **OR** hydrOXYzine (VISTARIL) injection 50 mg, 50 mg, Intramuscular, Q6H PRN, Lindsey Garza MD    acetaminophen (TYLENOL) tablet 650 mg, 650 mg, Oral, Q4H PRN, Lindsey Garza MD, 650 mg at 07/22/21 0913    traZODone (DESYREL) tablet 50 mg, 50 mg, Oral, Nightly PRN, Alba Hover Herminia Barrientos MD    benztropine mesylate (COGENTIN) injection 2 mg, 2 mg, Intramuscular, BID PRN, Arpit Monterroso MD    magnesium hydroxide (MILK OF MAGNESIA) 400 MG/5ML suspension 30 mL, 30 mL, Oral, Daily PRN, Arpit Monterroso MD    nicotine (NICODERM CQ) 21 MG/24HR 1 patch, 1 patch, Transdermal, Daily, rApit Monterroso MD, 1 patch at 07/22/21 9108    gabapentin (NEURONTIN) capsule 300 mg, 300 mg, Oral, TID, Arpit Monterroso MD, 300 mg at 07/22/21 1336    LORazepam (ATIVAN) tablet 2 mg, 2 mg, Oral, QPM, Arpit Monterroso MD, 2 mg at 07/21/21 1845    LORazepam (ATIVAN) tablet 1 mg, 1 mg, Oral, QAM, Arpit Monterroso MD, 1 mg at 07/22/21 0913    LORazepam (ATIVAN) tablet 1 mg, 1 mg, Oral, Lunch, Arpit Monterroso MD, 1 mg at 07/22/21 1227    ASSESSMENT/PLAN: Medication changes needed given the current presentation of symptoms. Patient was amenable to plan related to medications and endorsed understanding of the risks and benefits, which were explained and discussed. No acute concerns.      Reason for more than one antipsychotic:  [x] N/A   [] 3 failed monotherapy(drugs tried):  [] Cross over to a new antipsychotic  [] Taper to monotherapy from polypharmacy  [] Augmentation of Clozapine therapy due to treatment resistance to single therapy      Electronically signed by Joanna Hood MD on 7/22/2021 at 2:33 PM

## 2021-07-22 NOTE — PROGRESS NOTES
Patient did not attend group despite staff encouragement.   Electronically signed by Lucero Sherman on 7/21/2021 at 9:43 PM

## 2021-07-22 NOTE — GROUP NOTE
Group Therapy Note    Date: 7/22/2021    Group Start Time: 9870  Group End Time: 1500  Group Topic: Healthy Living/Wellness    [facility] Mclain RN        Group Therapy Note    Attendees:6/7         Patient's Goal:    Socialization, learning coping skills through out recovery. Status After Intervention:  Unchanged    Participation Level:  Active Listener    Participation Quality: Appropriate      Speech:  pressured      Thought Process/Content: Linear      Affective Functioning: Blunted      Mood: euthymic      Level of consciousness:  Alert      Response to Learning: Able to verbalize current knowledge/experience      Endings: None Reported    Modes of Intervention: Socialization and Exploration      Discipline Responsible: Registered Nurse      Signature:  Samia Mclain RN

## 2021-07-23 PROCEDURE — 95816 EEG AWAKE AND DROWSY: CPT

## 2021-07-23 PROCEDURE — 1240000000 HC EMOTIONAL WELLNESS R&B

## 2021-07-23 PROCEDURE — 6370000000 HC RX 637 (ALT 250 FOR IP): Performed by: NURSE PRACTITIONER

## 2021-07-23 PROCEDURE — 99232 SBSQ HOSP IP/OBS MODERATE 35: CPT | Performed by: PSYCHIATRY & NEUROLOGY

## 2021-07-23 PROCEDURE — 6370000000 HC RX 637 (ALT 250 FOR IP): Performed by: PHYSICIAN ASSISTANT

## 2021-07-23 PROCEDURE — 6370000000 HC RX 637 (ALT 250 FOR IP): Performed by: PSYCHIATRY & NEUROLOGY

## 2021-07-23 RX ADMIN — Medication 2000 UNITS: at 08:27

## 2021-07-23 RX ADMIN — LORAZEPAM 1 MG: 1 TABLET ORAL at 08:28

## 2021-07-23 RX ADMIN — IBUPROFEN 600 MG: 600 TABLET ORAL at 10:58

## 2021-07-23 RX ADMIN — LORAZEPAM 1 MG: 1 TABLET ORAL at 11:54

## 2021-07-23 RX ADMIN — LORAZEPAM 2 MG: 1 TABLET ORAL at 17:48

## 2021-07-23 RX ADMIN — TRIAMTERENE AND HYDROCHLOROTHIAZIDE 1 TABLET: 37.5; 25 TABLET ORAL at 08:27

## 2021-07-23 RX ADMIN — HYDROXYZINE PAMOATE 50 MG: 50 CAPSULE ORAL at 16:11

## 2021-07-23 RX ADMIN — ACETAMINOPHEN 650 MG: 325 TABLET ORAL at 20:39

## 2021-07-23 RX ADMIN — GABAPENTIN 300 MG: 300 CAPSULE ORAL at 20:39

## 2021-07-23 RX ADMIN — LEVOTHYROXINE SODIUM 50 MCG: 0.05 TABLET ORAL at 06:12

## 2021-07-23 RX ADMIN — LURASIDONE HYDROCHLORIDE 20 MG: 20 TABLET, FILM COATED ORAL at 17:49

## 2021-07-23 RX ADMIN — ANASTROZOLE 1 MG: 1 TABLET, COATED ORAL at 10:00

## 2021-07-23 ASSESSMENT — PAIN SCALES - GENERAL
PAINLEVEL_OUTOF10: 3

## 2021-07-23 NOTE — GROUP NOTE
Group Therapy Note    Date: 7/23/2021    Group Start Time: 1300  Group End Time: 1350  Group Topic: Psychoeducation    [facility]    JOAN Hurtado LSW        Group Therapy Note    Attendees:5         Patient's Goal:  To participate in stress management group therapy    Notes:  Patient shared stress reduction techniques    Status After Intervention:  Improved    Participation Level: Interactive    Participation Quality: Appropriate      Speech:  normal      Thought Process/Content: Logical      Affective Functioning: Congruent      Mood: calm      Level of consciousness:  Alert      Response to Learning: Able to verbalize current knowledge/experience      Endings: None Reported    Modes of Intervention: Education      Discipline Responsible: /Counselor      Signature:  JOAN Hurtado LSW

## 2021-07-23 NOTE — GROUP NOTE
Group Therapy Note    Date: 7/22/2021    Group Start Time: 1940  Group End Time: 2005  Group Topic: Recreational    [facility]    Radha Sesay        Group Therapy Note    Attendees: 4/9         Patient's Goal:  To participate in a game ([employer])    Notes:  Patient actively participated in game.     Status After Intervention:  Unchanged    Participation Level: Interactive    Participation Quality: Appropriate and Attentive      Speech:  normal      Thought Process/Content: Logical      Affective Functioning: Congruent      Mood: euthymic      Level of consciousness:  Alert and Attentive      Response to Learning: Progressing to goal      Endings: None Reported    Modes of Intervention: Activity      Discipline Responsible: [address], [employer]      Signature:  Radha Sesay

## 2021-07-23 NOTE — PROGRESS NOTES
Patient did not attend group despite staff encouragement.   Electronically signed by Sonam Quezada on 7/22/2021 at 9:27 PM

## 2021-07-23 NOTE — GROUP NOTE
Group Therapy Note    Date: 7/23/2021    Group Start Time: 1000  Group End Time: 1100  Group Topic: Recreational    [facility]    Belinda Varela        Group Therapy Note    Attendees: 6         Patient's Goal:  To go home. Notes:  Pt was attentive. Status After Intervention:  Unchanged    Participation Level: Interactive    Participation Quality: Sharing      Speech:  normal      Thought Process/Content: Logical      Affective Functioning: Congruent      Mood: calm      Level of consciousness:  Oriented x4      Response to Learning: Able to verbalize current knowledge/experience      Endings: None Reported    Modes of Intervention: Activity      Discipline Responsible: PCA    Signature:   Anna Marie Lehman

## 2021-07-23 NOTE — GROUP NOTE
Group Therapy Note    Date: 7/23/2021    Group Start Time: 8180  Group End Time: 3846  Group Topic: Healthy Living/Wellness    [facility]    Yamileth Ortiz        Group Therapy Note    Attendees: 6/11         Patient's Goal:  To participate in a game learning about self-esteem. Notes:  Patient actively participated in group    Status After Intervention:  Improved    Participation Level:  Active Listener, Interactive and Monopolizing    Participation Quality: Attentive, Sharing, Supportive and Intrusive      Speech:  pressured      Thought Process/Content: Logical      Affective Functioning: Congruent      Mood: anxious and elevated      Level of consciousness:  Alert and Attentive      Response to Learning: Able to verbalize current knowledge/experience, Able to verbalize/acknowledge new learning and Progressing to goal      Endings: None Reported    Modes of Intervention: Education      Discipline Responsible: [address], [employer]      Signature:  Yamileth Ortiz

## 2021-07-23 NOTE — PROGRESS NOTES
BEHAVIORAL HEALTH FOLLOW-UP NOTE     7/23/2021    [x] Patient was seen and examined in person  [x] Chart reviewed  [x] Labs reviewed  [x] Patient's case discussed with staff/team    Chief Complaint: \"I do feel like I could leave\"    Interim History: Patient reports she has a headache today, did talk to  this morning. Says she does get achy in her hands, talks about chronic joint pain. Feels she is sleeping a bit better. Discussed MRI, need for EEG. Appetite:  [x] Normal/Unchanged  [] Increased  [] Decreased    Sleep:   Slept 8 hours last night, feels sleep was has improved.  Denies nightmares   Suicidal ideation: [] Present  [x] Absent     Plan []Present  [] Absent  [x]  N/A  Homicidal ideation: []Present  [x] Absent  Energy:    [x] Normal/Unchanged  [] Increased  [] Decreased       Patient is [] able  [x]  unable to CONTRACT FOR SAFETY     Medication side effects(SE):  None     Examination:  /88   Pulse 96   Temp 98.1 °F (36.7 °C)   Resp 16   Ht 5' 7\" (1.702 m)   Wt 160 lb (72.6 kg)   SpO2 97%   BMI 25.06 kg/m²   Appearance:casual, in hospital attire   alert, cooperative  Speech    spontaneous and normal rate  Mood    \"better I think\"   Affect    anxiety  Thought Content    intact, excessive guilt and excessive preoccupations denies auditory or visual hallucinations, not responding to internal stimuli    Thought Process    tangential and circumstantial  Associations    logical connections  Insight poor  Judgment    Impaired  Orientation    oriented to person, place, time, and general circumstances  Memory    recent and remote memory intact  Attention/Concentration    intact    7/23/2021    PAST MEDICAL/PSYCHIATRIC HISTORY:   Past Medical History:   Diagnosis Date    Anxiety     Cancer ([facility] [address] 05/10/2017    DCIS left breast    Depression     [facility]     Migraines     Neuropathy     Thyroid disease      FAMILY/SOCIAL HISTORY:  Family History   Problem Relation Age of Onset  High Blood Pressure Mother     High Cholesterol Mother     Cancer Father         brain cancer    Stroke Father     Other Father         ulcerative colitis    High Blood Pressure Father     Cancer Sister         uterine cancer    High Blood Pressure Sister     High Blood Pressure Brother      Social History     Socioeconomic History    Marital status:      Spouse name: Not on file    Number of children: Not on file    Years of education: Not on file    Highest education level: Not on file   Occupational History    Not on file   Tobacco Use    Smoking status: Current Every Day Smoker     Packs/day: 1.00     Years: 13.00     Pack years: 13.00     Types: Cigarettes    Smokeless tobacco: Never Used    Tobacco comment: start age 10   Vaping Use    Vaping Use: Never used   Substance and Sexual Activity    Alcohol use: Yes     Comment: occasional     Drug use: No    Sexual activity: Yes   Other Topics Concern    Not on file   Social History Narrative    Not on file     Social Determinants of Health     Financial Resource Strain:     Difficulty of Paying Living Expenses:    Food Insecurity:     Worried About Running Out of Food in the Last Year:     Ran Out of Food in the Last Year:    Transportation Needs:     Lack of Transportation (Medical):      Lack of Transportation (Non-Medical):    Physical Activity:     Days of Exercise per Week:     Minutes of Exercise per Session:    Stress:     Feeling of Stress :    Social Connections:     Frequency of Communication with Friends and Family:     Frequency of Social Gatherings with Friends and Family:     Attends Judaism Services:     Active Member of Clubs or Organizations:     Attends Club or Organization Meetings:     Marital Status:    Intimate Partner Violence:     Fear of Current or Ex-Partner:     Emotionally Abused:     Physically Abused:     Sexually Abused:      LABS:  Lab Results   Component Value Date     traZODone (DESYREL) tablet 50 mg, 50 mg, Oral, Nightly PRN, Le Ballard MD, 50 mg at 07/22/21 2100    benztropine mesylate (COGENTIN) injection 2 mg, 2 mg, Intramuscular, BID PRN, Le Ballard MD    magnesium hydroxide (MILK OF MAGNESIA) 400 MG/5ML suspension 30 mL, 30 mL, Oral, Daily PRN, Le Ballard MD    nicotine (NICODERM CQ) 21 MG/24HR 1 patch, 1 patch, Transdermal, Daily, Le Ballard MD, 1 patch at 07/23/21 0827    gabapentin (NEURONTIN) capsule 300 mg, 300 mg, Oral, TID, Le Ballard MD, 300 mg at 07/23/21 0827    LORazepam (ATIVAN) tablet 2 mg, 2 mg, Oral, QPM, Le Ballard MD, 2 mg at 07/22/21 1739    LORazepam (ATIVAN) tablet 1 mg, 1 mg, Oral, QAM, Le Ballard MD, 1 mg at 07/23/21 3841    LORazepam (ATIVAN) tablet 1 mg, 1 mg, Oral, Lunch, Le Ballard MD, 1 mg at 07/23/21 1154    PSYCHOTHERAPY/COUNSELING:  [x] Therapeutic interview  [x] Supportive  [] CBT  [x] Ongoing  [] Other    ASSESSMENT: Continues to be tangential, circumstantial, but is showing improvement in mood and is more redirectable. Tolerating medication well.      EXAMINATION:  MRI BRAIN WO CONTRAST       HISTORY:   encephalopathy, frontotemporal dementia         TECHNIQUE:  MRI brain routine protocol without contrast.       COMPARISON:  MRI brain 9/8/2014       RESULT:       Acute Change:  No evidence of an acute intracranial process.        Hemorrhage:  No evidence of prior parenchymal hemorrhage on the susceptibility weighted sequences.       Mass Lesion/ Mass Effect:  No evidence of an intracranial mass or extra-axial fluid collection.  No significant mass effect.       Chronic Change:  Scattered punctate foci of increased T2 and FLAIR signal are noted in the supratentorial white matter which is a nonspecific finding, but likely represents minimal chronic microvascular ischemia.        Parenchyma:  No significant parenchymal volume loss for age.   Henrietta Fitting    Ventricles: medications:     Current Facility-Administered Medications:     lurasidone (LATUDA) tablet 20 mg, 20 mg, Oral, Reynaldo Garcia MD, 20 mg at 07/22/21 1739    vitamin D (CHOLECALCIFEROL) tablet 2,000 Units, 2,000 Units, Oral, Daily, Codi Garrido, APRN - NP, 2,000 Units at 07/23/21 0827    SUMAtriptan (IMITREX) tablet 100 mg, 100 mg, Oral, Daily PRN, Codi Garrido, APRN - NP    triamterene-hydroCHLOROthiazide (MAXZIDE-25) 37.5-25 MG per tablet 1 tablet, 1 tablet, Oral, Daily, Codi Garrido APRN - NP, 1 tablet at 07/23/21 0827    levothyroxine (SYNTHROID) tablet 50 mcg, 50 mcg, Oral, Daily, Codi Garrido, APRN - NP, 50 mcg at 07/23/21 1942    anastrozole (ARIMIDEX) tablet 1 mg, 1 mg, Oral, Daily, Thi Winter PA-C, 1 mg at 07/23/21 1000    haloperidol lactate (HALDOL) injection 5 mg, 5 mg, Intramuscular, Q6H PRN **OR** haloperidol (HALDOL) tablet 5 mg, 5 mg, Oral, Q6H PRN, Ryan Dey MD    hydrOXYzine (VISTARIL) capsule 50 mg, 50 mg, Oral, Q6H PRN, 50 mg at 07/22/21 1512 **OR** hydrOXYzine (VISTARIL) injection 50 mg, 50 mg, Intramuscular, Q6H PRN, Ryan Dey MD    acetaminophen (TYLENOL) tablet 650 mg, 650 mg, Oral, Q4H PRN, Ryan Dey MD, 650 mg at 07/22/21 1512    traZODone (DESYREL) tablet 50 mg, 50 mg, Oral, Nightly PRN, Ryan eDy MD, 50 mg at 07/22/21 2100    benztropine mesylate (COGENTIN) injection 2 mg, 2 mg, Intramuscular, BID PRN, Ryan Dey MD    magnesium hydroxide (MILK OF MAGNESIA) 400 MG/5ML suspension 30 mL, 30 mL, Oral, Daily PRN, Ryan Dey MD    nicotine (NICODERM CQ) 21 MG/24HR 1 patch, 1 patch, Transdermal, Daily, Ryan Dey MD, 1 patch at 07/23/21 0827    gabapentin (NEURONTIN) capsule 300 mg, 300 mg, Oral, TID, Ryan Dey MD, 300 mg at 07/23/21 0827    LORazepam (ATIVAN) tablet 2 mg, 2 mg, Oral, QPM, Ryan Dey MD, 2 mg at 07/22/21 1739    LORazepam (ATIVAN) tablet 1 mg, 1 mg, Oral, QAMJanine MD, 1 mg at 07/23/21 1330    LORazepam (ATIVAN) tablet 1 mg, 1 mg, Oral, Janine May MD, 1 mg at 07/23/21 8781    ASSESSMENT/PLAN: NO CHANGES TO MEDICATIONS: Patient doing well on current medication regimen which is being titrated under the supervision of the physician. At this time, while patient continues to exhibit symptoms, we will not change medications and will continue to monitor for continued improvement. No acute concerns voiced.      Reason for more than one antipsychotic:  [x] N/A   [] 3 failed monotherapy(drugs tried):  [] Cross over to a new antipsychotic  [] Taper to monotherapy from polypharmacy  [] Augmentation of Clozapine therapy due to treatment resistance to single therapy      Electronically signed by Damaso Han MD on 7/23/2021 at 12:54 PM

## 2021-07-23 NOTE — GROUP NOTE
Group Therapy Note    Date: 7/23/2021    Group Start Time: 1100  Group End Time: 1150  Group Topic: Psychoeducation    [facility]    JOAN Erwin LSW        Group Therapy Note    Attendees: 6         Patient's Goal:  To participate in group therapy and to identify a goal    Notes:  Patient's goal is to start putting herself first..    Status After Intervention:  Improved    Participation Level: Interactive    Participation Quality: Sharing      Speech:  normal      Thought Process/Content: Logical      Affective Functioning: Congruent      Mood: calm      Level of consciousness:  Alert      Response to Learning: Able to verbalize current knowledge/experience      Endings: None Reported    Modes of Intervention: Education      Discipline Responsible: /Counselor      Signature:  JOAN Erwin LSW

## 2021-07-23 NOTE — PROGRESS NOTES
Pt refusing to attend relaxation nursing group despite staff encouragement. Spoke to patient. Patient states he keeps receiving forms from [facility] and wants to know if he should do it. Patient states he will discuss with Dr. Orr when he comes in.

## 2021-07-24 PROCEDURE — APPSS30 APP SPLIT SHARED TIME 16-30 MINUTES: Performed by: STUDENT IN AN ORGANIZED HEALTH CARE EDUCATION/TRAINING PROGRAM

## 2021-07-24 PROCEDURE — 1240000000 HC EMOTIONAL WELLNESS R&B

## 2021-07-24 PROCEDURE — 6370000000 HC RX 637 (ALT 250 FOR IP): Performed by: PSYCHIATRY & NEUROLOGY

## 2021-07-24 PROCEDURE — 6370000000 HC RX 637 (ALT 250 FOR IP): Performed by: NURSE PRACTITIONER

## 2021-07-24 PROCEDURE — 6370000000 HC RX 637 (ALT 250 FOR IP): Performed by: PHYSICIAN ASSISTANT

## 2021-07-24 PROCEDURE — 99233 SBSQ HOSP IP/OBS HIGH 50: CPT | Performed by: PSYCHIATRY & NEUROLOGY

## 2021-07-24 PROCEDURE — 95816 EEG AWAKE AND DROWSY: CPT | Performed by: PSYCHIATRY & NEUROLOGY

## 2021-07-24 RX ORDER — IBUPROFEN 400 MG/1
400 TABLET ORAL EVERY 6 HOURS PRN
Status: DISCONTINUED | OUTPATIENT
Start: 2021-07-24 | End: 2021-07-29 | Stop reason: HOSPADM

## 2021-07-24 RX ADMIN — HYDROXYZINE PAMOATE 50 MG: 50 CAPSULE ORAL at 21:18

## 2021-07-24 RX ADMIN — TRIAMTERENE AND HYDROCHLOROTHIAZIDE 1 TABLET: 37.5; 25 TABLET ORAL at 09:00

## 2021-07-24 RX ADMIN — GABAPENTIN 300 MG: 300 CAPSULE ORAL at 21:18

## 2021-07-24 RX ADMIN — LEVOTHYROXINE SODIUM 50 MCG: 0.05 TABLET ORAL at 06:43

## 2021-07-24 RX ADMIN — HYDROXYZINE PAMOATE 50 MG: 50 CAPSULE ORAL at 01:03

## 2021-07-24 RX ADMIN — LORAZEPAM 1 MG: 1 TABLET ORAL at 08:39

## 2021-07-24 RX ADMIN — Medication 2000 UNITS: at 09:00

## 2021-07-24 RX ADMIN — LORAZEPAM 2 MG: 1 TABLET ORAL at 18:08

## 2021-07-24 RX ADMIN — ANASTROZOLE 1 MG: 1 TABLET, COATED ORAL at 10:48

## 2021-07-24 RX ADMIN — LORAZEPAM 1 MG: 1 TABLET ORAL at 12:37

## 2021-07-24 RX ADMIN — IBUPROFEN 400 MG: 400 TABLET, FILM COATED ORAL at 12:43

## 2021-07-24 ASSESSMENT — ENCOUNTER SYMPTOMS
NAUSEA: 0
COUGH: 0
SHORTNESS OF BREATH: 0
VOMITING: 0
TROUBLE SWALLOWING: 0
ABDOMINAL DISTENTION: 0
COLOR CHANGE: 0
WHEEZING: 0
CHEST TIGHTNESS: 0
ABDOMINAL PAIN: 0

## 2021-07-24 ASSESSMENT — PAIN SCALES - GENERAL: PAINLEVEL_OUTOF10: 4

## 2021-07-24 NOTE — FLOWSHEET NOTE
Pt denies SI/HI/AVH. She has labile mood. Pt became anxious and irritable when discussing her stay here. Per pt she does not have health insurance that would cover her stay or the tests that have been ordered. It was suggested that she talk to the . When trying to ask more relevant questions pt requested a paper bag to breath in. Pt was not able to be redirected from topic of health insurance or listen to suggestions. However, pt stood abruptly stopped crying and breathing heavy to request to be let into the sensory room. She was able to calm herself by doing yoga. She is social on unit and interacts well with staff.

## 2021-07-24 NOTE — PROGRESS NOTES
Patient did not attend group despite staff encouragement.   Electronically signed by Sonam Quezada on 7/23/2021 at 9:43 PM

## 2021-07-24 NOTE — PROGRESS NOTES
Patient voiced anxiety 7/10 with 10 being the worst, medicated with Vistaril, per request. Will continue to monitor.

## 2021-07-24 NOTE — GROUP NOTE
Group Therapy Note    Date: 7/24/2021    Group Start Time: 1400  Group End Time: 1430  Group Topic: Healthy Living/Wellness    [facility]    Charmaine Sandy RN        Group Therapy Note    Attendees: 5/11         Patient's Goal:   Discuss reflection topics. Notes: Attentive and appropriate. Status After Intervention:  Improved    Participation Level:  Active Listener and Interactive    Participation Quality: Appropriate, Attentive and Sharing      Speech:  normal      Thought Process/Content: Logical      Affective Functioning: Congruent      Mood: euthymic      Level of consciousness:  Alert, Oriented x4 and Attentive      Response to Learning: Progressing to goal      Endings: None Reported    Modes of Intervention: Support, Socialization and Exploration      Discipline Responsible: Registered Nurse      Signature:  Charmaine Sandy RN

## 2021-07-24 NOTE — PROGRESS NOTES
Patient did not attend group despite staff encouragement.   Electronically signed by Pierce Nunez on 7/23/2021 at 10:21 PM

## 2021-07-24 NOTE — GROUP NOTE
Group Therapy Note    Date: 7/24/2021    Group Start Time: 1100  Group End Time: 1150  Group Topic: Psychoeducation    [facility]    Emilee Sullivan OT        Group Therapy Note    Attendees: 4         Patient's Goal:  To go home    Notes:  During time in group, Pt demonstrated good attention to task, though perseverated on need to be discharged d/t \"I have no insurance for here and I already owe $3,000, I don't want that to go up to $4,000\".     Status After Intervention:  Unchanged    Participation Level: Interactive    Participation Quality: Appropriate      Speech:  pressured      Thought Process/Content: Linear      Affective Functioning: Exaggerated      Mood: anxious      Level of consciousness:  Alert and Attentive      Response to Learning: Progressing to goal      Endings: None Reported    Modes of Intervention: Support, Socialization and Activity      Discipline Responsible: Psychoeducational Specialist      Signature:  Emilee Slulivan OT

## 2021-07-24 NOTE — FLOWSHEET NOTE
Pt is social on unit and spent time working on puzzles and playing cards with peers. She reports improved sleep and having a good appetite. She denies SI/HI/AVH. Pt has some difficulty staying focused during conversations with flight of ideas. Pt is observed anxious but did not admit to anxiety or depression.

## 2021-07-24 NOTE — PROGRESS NOTES
BEHAVIORAL HEALTH FOLLOW-UP NOTE     7/24/2021    [x] Patient was seen and examined in person  [x] Chart reviewed  [x] Labs reviewed  [x] Patient's case discussed with staff/team    Chief Complaint: \"I'm great\"  Interim History:   Patient continues to be manic, with rapid, pressured speech, and circumstantial and at times tangential thought process. She denied having pushed her daughter, and said her daughter is \"a catalyst for a lot of problems\". She denied having any symptoms. She said she \"slept about 4 hours, then read, then slept again\". She said her appetite was \"OK\". She denied having suicidal or homicidal ideation. She denied hallucinations, paranoia or other delusions. She is very focused on being discharged; claims that she has [facility] and her stay in [facility] is not covered by [employer] and therefore she would be responsible for paying for it. Staff reports that she has been refusing some doses of Neurontin.     Appetite:  [x] Normal/Unchanged  [] Increased  [] Decreased    Sleep: Reported to be interrupted     Suicidal ideation: [] Present  [x] denies     Plan []Present  [] Absent  [x]  N/A  Homicidal ideation: []Present  [x] denies  Energy:    [] Normal/Unchanged  [x] Increased  [] Decreased       Patient is [] able  [x]  unable to CONTRACT FOR SAFETY     Medication side effects(SE):  None     Examination:  /81   Pulse 97   Temp 97.8 °F (36.6 °C) (Oral)   Resp 16   Ht 5' 7\" (1.702 m)   Wt 160 lb (72.6 kg)   SpO2 98%   BMI 25.06 kg/m²   Appearance:casual, in hospital attire   alert, cooperative  Speech    spontaneous and normal rate  Mood    \"great\"   Affect   Mildly irritable, anxious  Thought Content   Denies paranoia or other delusions, denies suicidal or homicidal ideation  Perception: denies auditory or visual hallucinations, not responding to internal stimuli    Thought Process    tangential and circumstantial  Associations   Able to make some logical connections  Insight poor  Judgment    Impaired  Orientation    oriented to person, place, time, and general circumstances  Memory   recent and remote memory intact  Attention/Concentration fair    7/24/2021    PAST MEDICAL/PSYCHIATRIC HISTORY:   Past Medical History:   Diagnosis Date    Anxiety     Cancer ([facility] 05/10/2017    DCIS left breast    Depression     Manic depression ([facility]     Migraines     Neuropathy     Thyroid disease      FAMILY/SOCIAL HISTORY:  Family History   Problem Relation Age of Onset    High Blood Pressure Mother     High Cholesterol Mother     Cancer Father         brain cancer    Stroke Father     Other Father         ulcerative colitis    High Blood Pressure Father     Cancer Sister         uterine cancer    High Blood Pressure Sister     High Blood Pressure Brother      Social History     Socioeconomic History    Marital status:      Spouse name: Not on file    Number of children: Not on file    Years of education: Not on file    Highest education level: Not on file   Occupational History    Not on file   Tobacco Use    Smoking status: Current Every Day Smoker     Packs/day: 1.00     Years: 13.00     Pack years: 13.00     Types: Cigarettes    Smokeless tobacco: Never Used    Tobacco comment: start age 10   Vaping Use    Vaping Use: Never used   Substance and Sexual Activity    Alcohol use: Yes     Comment: occasional     Drug use: No    Sexual activity: Yes   Other Topics Concern    Not on file   Social History Narrative    Not on file     Social Determinants of Health     Financial Resource Strain:     Difficulty of Paying Living Expenses:    Food Insecurity:     Worried About Running Out of Food in the Last Year:     Ran Out of Food in the Last Year:    Transportation Needs:     Lack of Transportation (Medical):      Lack of Transportation (Non-Medical):    Physical Activity:     Days of Exercise per Week:     Minutes of Exercise per Session:    Stress:     Feeling of Stress :    Social Connections:     Frequency of Communication with Friends and Family:     Frequency of Social Gatherings with Friends and Family:     Attends Rastafarian Services:     Active Member of Clubs or Organizations:     Attends Club or Organization Meetings:     Marital Status:    Intimate Partner Violence:     Fear of Current or Ex-Partner:     Emotionally Abused:     Physically Abused:     Sexually Abused:      LABS:  Lab Results   Component Value Date     07/19/2021    BUN 9 07/19/2021    CREATININE 0.82 07/19/2021    TSH 2.060 07/19/2021    WBC 5.7 07/19/2021     No results found for: PHENYTOIN, PHENOBARB, VALPROATE, CBMZ  Lab Results   Component Value Date    INR 1.0 01/28/2016    PROTIME 9.3 (L) 01/28/2016     Lab Results   Component Value Date    APTT 25.0 01/28/2016     No results for input(s): ETOH in the last 72 hours. ROS:  [x]  All negative/unchanged except if checked.  Explain positive(checked items) below:  [] Constitutional  [] Eyes  [] Ear/Nose/Mouth/Throat  [] Respiratory  [] CV  [] GI  []   [] Musculoskeletal  [] Skin/Breast  [] Neurological  [] Endocrine  [] Heme/Lymph  [] Allergic/Immunologic    Explanation:     MEDICATIONS:    Current Facility-Administered Medications:     ibuprofen (ADVIL;MOTRIN) tablet 400 mg, 400 mg, Oral, Q6H PRN, Kassidy Blackwell MD, 400 mg at 07/24/21 1243    lurasidone (LATUDA) tablet 20 mg, 20 mg, Oral, Shalonda Gregorio MD, 20 mg at 07/23/21 1749    vitamin D (CHOLECALCIFEROL) tablet 2,000 Units, 2,000 Units, Oral, Daily, Fayrene Hence, APRN - NP, 2,000 Units at 07/24/21 0900    SUMAtriptan (IMITREX) tablet 100 mg, 100 mg, Oral, Daily PRN, Fayrene Hence, APRN - NP    triamterene-hydroCHLOROthiazide (MAXZIDE-25) 37.5-25 MG per tablet 1 tablet, 1 tablet, Oral, Daily, Fayrene Hence, APRN - NP, 1 tablet at 07/24/21 0900    levothyroxine (SYNTHROID) tablet 50 mcg, 50 mcg, Oral, Daily, Fayrene Hence, APRN - NP, 50 mcg at 07/24/21 0643    anastrozole (ARIMIDEX) tablet 1 mg, 1 mg, Oral, Daily, Jana Winter PA-C, 1 mg at 07/24/21 1048    haloperidol lactate (HALDOL) injection 5 mg, 5 mg, Intramuscular, Q6H PRN **OR** haloperidol (HALDOL) tablet 5 mg, 5 mg, Oral, Q6H PRN, Jayy De La Garza MD    hydrOXYzine (VISTARIL) capsule 50 mg, 50 mg, Oral, Q6H PRN, 50 mg at 07/24/21 0103 **OR** hydrOXYzine (VISTARIL) injection 50 mg, 50 mg, Intramuscular, Q6H PRN, Jayy De La Garza MD    acetaminophen (TYLENOL) tablet 650 mg, 650 mg, Oral, Q4H PRN, Jayy De La Garza MD, 650 mg at 07/23/21 2039    traZODone (DESYREL) tablet 50 mg, 50 mg, Oral, Nightly PRN, Jayy De La Garza MD, 50 mg at 07/22/21 2100    benztropine mesylate (COGENTIN) injection 2 mg, 2 mg, Intramuscular, BID PRN, Jayy De La Garza MD    magnesium hydroxide (MILK OF MAGNESIA) 400 MG/5ML suspension 30 mL, 30 mL, Oral, Daily PRN, Jayy De La Garza MD    nicotine (NICODERM CQ) 21 MG/24HR 1 patch, 1 patch, Transdermal, Daily, Jayy De La Garza MD, 1 patch at 07/24/21 4718    gabapentin (NEURONTIN) capsule 300 mg, 300 mg, Oral, TID, Jayy De La Garza MD, 300 mg at 07/23/21 2039    LORazepam (ATIVAN) tablet 2 mg, 2 mg, Oral, QPM, Jayy De La Garza MD, 2 mg at 07/23/21 1748    LORazepam (ATIVAN) tablet 1 mg, 1 mg, Oral, QAM, Jayy De La Garza MD, 1 mg at 07/24/21 7238    LORazepam (ATIVAN) tablet 1 mg, 1 mg, Oral, Lunch, Jayy De La Garza MD, 1 mg at 07/24/21 1237    PSYCHOTHERAPY/COUNSELING:  [x] Therapeutic interview  [x] Supportive  [] CBT  [x] Ongoing  [] Other    ASSESSMENT: Continues to be tangential, circumstantial, but is showing improvement in mood and is more redirectable. Tolerating medication well. She is denying symptoms, but she has limited insight and is very much preoccupied with discharge therefore she may be minimizing. The Neuro workup did not support at this time a suspicion of Fronto-temporal dementia.     EXAMINATION:  MRI BRAIN WO CONTRAST       HISTORY:   encephalopathy, frontotemporal dementia         TECHNIQUE:  MRI brain routine protocol without contrast.       COMPARISON:  MRI brain 9/8/2014       RESULT:       Acute Change:  No evidence of an acute intracranial process.        Hemorrhage:  No evidence of prior parenchymal hemorrhage on the susceptibility weighted sequences.       Mass Lesion/ Mass Effect:  No evidence of an intracranial mass or extra-axial fluid collection.  No significant mass effect.       Chronic Change:  Scattered punctate foci of increased T2 and FLAIR signal are noted in the supratentorial white matter which is a nonspecific finding, but likely represents minimal chronic microvascular ischemia.        Parenchyma:  No significant parenchymal volume loss for age.   Adelaide Presser    Ventricles:  Normal caliber and morphology.       Skull Base:  Hypothalamic and pituitary region are grossly normal. Craniocervical junction is normal. No significant marrow replacement process.       Vasculature:  Apparent loss of flow related signal within the right distal IJ, sigmoid and transverse sinuses may be related to slow flow or thrombosis. Otherwise major intracranial arteries and dural venous sinuses demonstrate normal flow voids.       Other:  The paranasal sinuses and mastoid air cells are clear.  The orbits and extracranial soft tissues are unremarkable.             Impression       1.  Apparent loss of flow-related signal within the right distal IJ, sigmoid and transverse sinuses may be related to slow flow or sinus thrombosis. If there is clinical concern for thrombosis, a CTV or MRV may be obtained. 2.  Otherwise no acute intracranial abnormality.          [x] Patient continues to need, on a daily basis, active treatment furnished directly by or requiring the supervision of inpatient psychiatric personnel     Diagnosis:  Bipolar disorder, manic    Treatment Plan:      DATE and changes made  7/20/21  lorazepam (Ativan) 1 mg, 1 mg, 2 mg every night   Gabapentin (Neurontin) 300 mg three times daily  7/21/21  lorazepam (Ativan) 1 mg, 1 mg, 2 mg every night   Gabapentin (Neurontin) 300 mg three times daily   7/22/21  lorazepam (Ativan) 1 mg, 1 mg, 2 mg every night   Gabapentin (Neurontin) 300 mg three times daily   START lurasidone (Latuda) 20 mg daily   MRI results pending   EEG pending   Folate 15.8 and B12 547 (within normal limits)       [x] Continue Current Medications  [x] Continue Follow-up and coordination of outpatient care in preparation for discharge from the hospital   [x] Continue Labs  [x] Risks, benefits, side effects, drug-to-drug interactions and alternatives to treatment were discussed in my usual manner.     Current medications:     Current Facility-Administered Medications:     ibuprofen (ADVIL;MOTRIN) tablet 400 mg, 400 mg, Oral, Q6H PRN, Yenni Gaitan MD, 400 mg at 07/24/21 1243    lurasidone (LATUDA) tablet 20 mg, 20 mg, Oral, Alberta Eisenberg MD, 20 mg at 07/23/21 1749    vitamin D (CHOLECALCIFEROL) tablet 2,000 Units, 2,000 Units, Oral, Daily, Malcom Bernheim APRN - NP, 2,000 Units at 07/24/21 0900    SUMAtriptan (IMITREX) tablet 100 mg, 100 mg, Oral, Daily PRN, [facility], APRN - NP    triamterene-hydroCHLOROthiazide (MAXZIDE-25) 37.5-25 MG per tablet 1 tablet, 1 tablet, Oral, Daily, [facility] APRN - NP, 1 tablet at 07/24/21 0900    levothyroxine (SYNTHROID) tablet 50 mcg, 50 mcg, Oral, Daily, [facility] APRN - NP, 50 mcg at 07/24/21 2489    anastrozole (ARIMIDEX) tablet 1 mg, 1 mg, Oral, Daily, Audrey Winter PA-C, 1 mg at 07/24/21 1048    haloperidol lactate (HALDOL) injection 5 mg, 5 mg, Intramuscular, Q6H PRN **OR** haloperidol (HALDOL) tablet 5 mg, 5 mg, Oral, Q6H PRN, Arpit Monterroso MD    hydrOXYzine (VISTARIL) capsule 50 mg, 50 mg, Oral, Q6H PRN, 50 mg at 07/24/21 0103 **OR** hydrOXYzine (VISTARIL) injection 50 mg, 50 mg, Intramuscular, Q6H PRN, Eugenia Devi MD    acetaminophen (TYLENOL) tablet 650 mg, 650 mg, Oral, Q4H PRN, Eugenia Devi MD, 650 mg at 07/23/21 2039    traZODone (DESYREL) tablet 50 mg, 50 mg, Oral, Nightly PRN, Eugenia Devi MD, 50 mg at 07/22/21 2100    benztropine mesylate (COGENTIN) injection 2 mg, 2 mg, Intramuscular, BID PRN, Eugenia Devi MD    magnesium hydroxide (MILK OF MAGNESIA) 400 MG/5ML suspension 30 mL, 30 mL, Oral, Daily PRN, Eugenia Devi MD    nicotine (NICODERM CQ) 21 MG/24HR 1 patch, 1 patch, Transdermal, Daily, Eugenia Devi MD, 1 patch at 07/24/21 7939    gabapentin (NEURONTIN) capsule 300 mg, 300 mg, Oral, TID, Eugenia Devi MD, 300 mg at 07/23/21 2039    LORazepam (ATIVAN) tablet 2 mg, 2 mg, Oral, QPM, Eugenia Devi MD, 2 mg at 07/23/21 1748    LORazepam (ATIVAN) tablet 1 mg, 1 mg, Oral, QAM, Eugenia Devi MD, 1 mg at 07/24/21 6497    LORazepam (ATIVAN) tablet 1 mg, 1 mg, Oral, Lunch, Eugenia Devi MD, 1 mg at 07/24/21 1237    ASSESSMENT/PLAN: NO CHANGES TO MEDICATIONS: Patient doing well on current medication regimen which is being titrated under the supervision of the physician. At this time, while patient continues to exhibit symptoms, we will not change medications and will continue to monitor for continued improvement. No acute concerns voiced.      Reason for more than one antipsychotic:  [x] N/A   [] 3 failed monotherapy(drugs tried):  [] Cross over to a new antipsychotic  [] Taper to monotherapy from polypharmacy  [] Augmentation of Clozapine therapy due to treatment resistance to single therapy      Electronically signed by Ximena Gotti MD on 7/24/2021 at 2:58 PM

## 2021-07-24 NOTE — PROGRESS NOTES
Pt attended the 0900 morning community meeting.      Electronically signed by Betsey Monroe OT on 7/24/2021 at 9:16 AM

## 2021-07-24 NOTE — PROCEDURES
[address]                      [address], [address] [facility]                          ELECTROENCEPHALOGRAM REPORT    PATIENT NAME: Kerry Anthony                         :        1960  MED REC NO:   [id number]                            ROOM:       [id number]  ACCOUNT NO:   [de-identified]                           ADMIT DATE: 2021  PROVIDER:     Marisol Alexandra MD    DATE OF EE2021    MEDICATIONS:  Dana Fitting, Neurontin, Ativan. EEG FINDINGS:  This is a spontaneous 21-channel recording for the  patient with acute psychosis. The background rhythm of this EEG shows  very well-regulated 9 Hz posterior dominant rhythm of alpha. This is  symmetrical and attenuates with eye opening. EKG is monitored, this is  regular. Photic stimulation is performed with no notable photic  responses. Hyperventilation is performed with good effort. The record  remains symmetrical throughout without any asymmetries or epileptiform  discharge. There is no slowing. IMPRESSION:  This is a normal very well-regulated EEG recording in the  context of the patient's cognitive changes. Given these findings, an  underlying primary psychiatric disorder is more likely.         Ernie Wolf MD    D: 2021 11:36:00       T: 2021 11:46:56     [id number]  Job#: [id number]     Doc#: [id number]    CC:

## 2021-07-24 NOTE — GROUP NOTE
Group Therapy Note    Date: 7/24/2021    Group Start Time: 1000  Group End Time: 3314  Group Topic: Psychotherapy    [facility]    Armanda Krabbe, LSW        Group Therapy Note    Attendees: 7/11         Patient's Goal:  \"Go home\"    Notes:  Pt was supportive and shared consistently during group. Behavior was mildly monopolizing but pt was able to curb impulse to speak over others. Status After Intervention:  Unchanged    Participation Level:  Active Listener, Interactive and Monopolizing    Participation Quality: Appropriate, Attentive, Sharing and Supportive      Speech:  pressured and loud      Thought Process/Content: Logical      Affective Functioning: Congruent      Mood: contented      Level of consciousness:  Alert, Oriented x4 and Attentive      Response to Learning: Able to verbalize current knowledge/experience, Able to retain information and Capable of insight      Endings: None Reported    Modes of Intervention: Education, Exploration, Problem-solving and Activity      Discipline Responsible: /Counselor      Signature:  Armanda Krabbe, LSW

## 2021-07-24 NOTE — PROGRESS NOTES
[address] [facility] Neurology consult Note  Name: Patricia Mcnulty  Age: 59 y.o. Gender: female  CodeStatus: Full Code  Allergies: Sulfa Antibiotics  Clonidine  Prochlorperazine  Varenicline    Chief Complaint:Psychiatric Evaluation    Primary Care Provider: Saeid Bay DO  InpatientTreatment Team: Treatment Team: Attending Provider: Ida Esposito MD; Utilization Reviewer: Joni Peck, SHARI; Consulting Physician: IDALMIS Dawkins - NP; Consulting Physician: Natalia Aadms MD; Registered Nurse: Lawyer Garcia, RN; Registered Nurse: Mercy Noriega, RN; Registered Nurse: Lou Holbrook, SHARI; Tech: [employer]  Admission Date: 7/19/2021      HPI     Patient seen and examined for neurology follow-up for possible frontal temporal dementia. Patient is alert and oriented x3, cooperative, and in no acute distress. Speech remains pressured and patient is quite tangential.  Patient denies any symptoms of sinus thrombosis including headache, visual field deficit, vision changes, nausea, vomiting, seizures, positional headache, or head pressure. Able to follow commands and exam nonfocal.  There has not been any headache no fevers or any other symptoms.       Vitals:    07/24/21 1730   BP:    Pulse:    Resp:    Temp: 98.7 °F (37.1 °C)   SpO2:      Family History   Problem Relation Age of Onset    High Blood Pressure Mother     High Cholesterol Mother     Cancer Father         brain cancer    Stroke Father     Other Father         ulcerative colitis    High Blood Pressure Father     Cancer Sister         uterine cancer    High Blood Pressure Sister     High Blood Pressure Brother      Social History     Socioeconomic History    Marital status:      Spouse name: Not on file    Number of children: Not on file    Years of education: Not on file    Highest education level: Not on file   Occupational History    Not on file   Tobacco Use    Smoking status: Current Every Day Smoker     Packs/day: 1.00     Years: 13.00     Pack years: 13.00     Types: Cigarettes    Smokeless tobacco: Never Used    Tobacco comment: start age 10   Vaping Use    Vaping Use: Never used   Substance and Sexual Activity    Alcohol use: Yes     Comment: occasional     Drug use: No    Sexual activity: Yes   Other Topics Concern    Not on file   Social History Narrative    Not on file     Social Determinants of Health     Financial Resource Strain:     Difficulty of Paying Living Expenses:    Food Insecurity:     Worried About [address] in the Last Year:     Ran Out of Food in the Last Year:    Transportation Needs:     Lack of Transportation (Medical):     Lack of Transportation (Non-Medical):    Physical Activity:     Days of Exercise per Week:     Minutes of Exercise per Session:    Stress:     Feeling of Stress :    Social Connections:     Frequency of Communication with Friends and Family:     Frequency of Social Gatherings with Friends and Family:     Attends [employer] Services: Active Member of Clubs or Organizations:     Attends Club or Organization Meetings:     Marital Status:    Intimate Partner Violence:     Fear of Current or Ex-Partner:     Emotionally Abused:     Physically Abused:     Sexually Abused:         Review of Systems   Constitutional: Negative for activity change, appetite change, chills, fatigue, fever and unexpected weight change. HENT: Negative for hearing loss and trouble swallowing. Eyes: Negative for visual disturbance. Respiratory: Negative for cough, chest tightness, shortness of breath and wheezing. Cardiovascular: Negative for chest pain, palpitations and leg swelling. Gastrointestinal: Negative for abdominal distention, abdominal pain, nausea and vomiting. Genitourinary: Negative for difficulty urinating and dysuria. Musculoskeletal: Negative for gait problem, neck pain and neck stiffness. Skin: Negative for color change and rash.    Neurological: Negative for dizziness, tremors, seizures, syncope, facial asymmetry, speech difficulty, weakness, light-headedness, numbness and headaches. Psychiatric/Behavioral: Positive for agitation, behavioral problems, confusion, decreased concentration and sleep disturbance. Negative for hallucinations. The patient is nervous/anxious and is hyperactive. Physical Exam  Vitals and nursing note reviewed. Constitutional:       General: She is not in acute distress. Appearance: She is not ill-appearing or diaphoretic. HENT:      Head: Normocephalic and atraumatic. Eyes:      General: No visual field deficit. Extraocular Movements: Extraocular movements intact. Pupils: Pupils are equal, round, and reactive to light. Cardiovascular:      Rate and Rhythm: Normal rate and regular rhythm. Pulmonary:      Effort: Pulmonary effort is normal. No respiratory distress. Breath sounds: Normal breath sounds. Abdominal:      General: Bowel sounds are normal.      Palpations: Abdomen is soft. Skin:     General: Skin is warm and dry. Neurological:      General: No focal deficit present. Mental Status: She is alert and oriented to person, place, and time. Cranial Nerves: No cranial nerve deficit, dysarthria or facial asymmetry. Sensory: No sensory deficit. Motor: No weakness, tremor, atrophy, abnormal muscle tone, seizure activity or pronator drift. Coordination: Romberg sign negative.  Coordination normal. Finger-Nose-Finger Test normal.      Gait: Gait normal.      Deep Tendon Reflexes: Reflexes normal.               Medications:  Reviewed    Infusion Medications:    Scheduled Medications:    lurasidone  20 mg Oral Dinner    Vitamin D  2,000 Units Oral Daily    triamterene-hydroCHLOROthiazide  1 tablet Oral Daily    levothyroxine  50 mcg Oral Daily    anastrozole  1 mg Oral Daily    nicotine  1 patch Transdermal Daily    gabapentin  300 mg Oral TID    LORazepam  2 mg Oral QPM    LORazepam  1 mg Oral QAM LORazepam  1 mg Oral Lunch     PRN Meds: ibuprofen, SUMAtriptan, haloperidol lactate **OR** haloperidol, hydrOXYzine **OR** hydrOXYzine, acetaminophen, traZODone, benztropine mesylate, magnesium hydroxide    Labs:   No results for input(s): WBC, HGB, HCT, PLT in the last 72 hours. No results for input(s): NA, K, CL, CO2, BUN, CREATININE, CALCIUM, PHOS in the last 72 hours. Invalid input(s): MAGNES  No results for input(s): AST, ALT, BILIDIR, BILITOT, ALKPHOS in the last 72 hours. No results for input(s): INR in the last 72 hours. No results for input(s): Winford Mid[place] in the last 72 hours. Urinalysis:   Lab Results   Component Value Date    NITRU Negative 07/19/2021    WBCUA 20-50 07/19/2021    BACTERIA Negative 07/19/2021    RBCUA 0-2 07/19/2021    BLOODU Negative 07/19/2021    SPECGRAV 1.013 07/19/2021    GLUCOSEU Negative 07/19/2021    GLUCOSEU NEG 04/05/2012       Radiology:   Most recent    EEG No valid procedures specified. MRI of Brain No results found for this or any previous visit. No results found for this or any previous visit. MRA of the Head and Neck: No results found for this or any previous visit. No results found for this or any previous visit. No results found for this or any previous visit. CT of the Head: No results found for this or any previous visit. No results found for this or any previous visit. No results found for this or any previous visit. Carotid duplex: No results found for this or any previous visit. No results found for this or any previous visit. No results found for this or any previous visit. Echo No results found for this or any previous visit. Assessment/Plan:  Bipolar disorder currently in inpatient behavioral health unit. Patient has been initiated on Genell Marchi. There is concern from psychiatry for frontotemporal dementia. Will obtain work-up with MRI of the brain and EEG.   Will assess B12 and folate and RPR. No metabolic disturbances noted on Laboratory testing. Thyroid studies and normal range. She will need MMSE done tomorrow and further outpatient neuropsychiatric testing. She does report that she recently stopped her Cymbalta approximately 3 months ago and behavior started shortly after that according to family. Further recommendations to follow pending work-up. EEG well-regulated and not suggestive of encephalopathy or epilepsy. MRI of the brain did show very mild global atrophy when compared to previous in 2014, but not suggestive of frontal temporal dementia at this time. Symptoms most likely related to bipolar disorder. MRI did show apparent loss of flow related signal within the right distal IJ, sigmoid, and transverse sinus which may be related to slow flow or sinus thrombosis. Of note, patient is on anastrozole which could increase her risk of venous thrombosis. I will have Dr. John Spencer review the MRI and consider MRV based on his recommendations although patient does not have any signs or symptoms of venous thrombosis. I have personally performed a face to face diagnostic evaluation on this patient, reviewed all data and investigations, and am the sole provider of all clinical decisions on the neurological status of this patient. Vents noted in the above findings are not consistent with venous sinus thrombosis given that her MRI is entirely normal and she does not have a headache or any other symptoms to suggest a venous sinus thrombosis. This is artifactual and one cannot diagnose venous disease with simple MRI of the brain. His EEG is so entirely normal and very well regulated and therefore underlying neurological dysfunction with a clinical examination of this nature does not suggest a neurological disorder. Carlito Spencer MD, Daron Alvares, American Board of Psychiatry & Neurology  Board Certified in Vascular Neurology  Board Certified in Neuromuscular Medicine  Certified in Neurorehabilitation         Collaborating physicians: Dr John Spencer    Electronically signed by Kassidy Gentile PA-C on 7/24/2021 at 5:37 PM

## 2021-07-25 PROCEDURE — 6370000000 HC RX 637 (ALT 250 FOR IP): Performed by: PSYCHIATRY & NEUROLOGY

## 2021-07-25 PROCEDURE — 6370000000 HC RX 637 (ALT 250 FOR IP): Performed by: PHYSICIAN ASSISTANT

## 2021-07-25 PROCEDURE — 6370000000 HC RX 637 (ALT 250 FOR IP): Performed by: NURSE PRACTITIONER

## 2021-07-25 PROCEDURE — 99233 SBSQ HOSP IP/OBS HIGH 50: CPT | Performed by: PSYCHIATRY & NEUROLOGY

## 2021-07-25 PROCEDURE — 1240000000 HC EMOTIONAL WELLNESS R&B

## 2021-07-25 RX ADMIN — IBUPROFEN 400 MG: 400 TABLET, FILM COATED ORAL at 03:01

## 2021-07-25 RX ADMIN — LORAZEPAM 1 MG: 1 TABLET ORAL at 12:14

## 2021-07-25 RX ADMIN — LURASIDONE HYDROCHLORIDE 20 MG: 20 TABLET, FILM COATED ORAL at 17:35

## 2021-07-25 RX ADMIN — LORAZEPAM 1 MG: 1 TABLET ORAL at 08:16

## 2021-07-25 RX ADMIN — ANASTROZOLE 1 MG: 1 TABLET, COATED ORAL at 09:06

## 2021-07-25 RX ADMIN — GABAPENTIN 300 MG: 300 CAPSULE ORAL at 21:20

## 2021-07-25 RX ADMIN — HYDROXYZINE PAMOATE 50 MG: 50 CAPSULE ORAL at 10:06

## 2021-07-25 RX ADMIN — LEVOTHYROXINE SODIUM 50 MCG: 0.05 TABLET ORAL at 06:31

## 2021-07-25 RX ADMIN — IBUPROFEN 400 MG: 400 TABLET, FILM COATED ORAL at 10:06

## 2021-07-25 RX ADMIN — LORAZEPAM 2 MG: 1 TABLET ORAL at 18:56

## 2021-07-25 RX ADMIN — TRIAMTERENE AND HYDROCHLOROTHIAZIDE 1 TABLET: 37.5; 25 TABLET ORAL at 08:16

## 2021-07-25 RX ADMIN — HYDROXYZINE PAMOATE 50 MG: 50 CAPSULE ORAL at 23:39

## 2021-07-25 RX ADMIN — Medication 2000 UNITS: at 08:16

## 2021-07-25 RX ADMIN — IBUPROFEN 400 MG: 400 TABLET, FILM COATED ORAL at 16:18

## 2021-07-25 ASSESSMENT — ENCOUNTER SYMPTOMS
WHEEZING: 0
SHORTNESS OF BREATH: 0
ABDOMINAL DISTENTION: 0
COLOR CHANGE: 0
ABDOMINAL PAIN: 0
VOMITING: 0
COUGH: 0
NAUSEA: 0
TROUBLE SWALLOWING: 0
CHEST TIGHTNESS: 0

## 2021-07-25 ASSESSMENT — PAIN SCALES - GENERAL
PAINLEVEL_OUTOF10: 5
PAINLEVEL_OUTOF10: 6
PAINLEVEL_OUTOF10: 6

## 2021-07-25 NOTE — CARE COORDINATION
7/25/2021 @ 1008 - Patient approached  about her insurance dilemma. Per patient, her insurance only covers health care at [facility].  let patient know she could speak with the financial counselor about her dilemma as that department could determine eligibility for hospital admission assistance programs.      Electronically signed by Ethan García on 7/25/2021 at 10:11 AM

## 2021-07-25 NOTE — GROUP NOTE
Group Therapy Note    Date: 7/24/2021    Group Start Time: 2055  Group End Time: 2107  Group Topic: Wrap-Up    [facility]    Collie Mercy        Group Therapy Note    Attendees: 6/11         Patient's Goal:  \"to go home\"    Notes:  Patient reported not meeting their goal for the day. Patient is happy she got a visit from her nephew Ravin Noriega.     Status After Intervention:  Unchanged    Participation Level: Interactive    Participation Quality: Appropriate, Attentive and Sharing      Speech:  normal      Thought Process/Content: Logical      Affective Functioning: Flat      Mood: euthymic      Level of consciousness:  Alert and Attentive      Response to Learning: Progressing to goal      Endings: None Reported    Modes of Intervention: Support      Discipline Responsible: [employer]      Signature:  Karlene Winston

## 2021-07-25 NOTE — GROUP NOTE
Group Therapy Note    Date: 7/24/2021    Group Start Time: 2000  Group End Time: 2030  Group Topic: Recreational    [facility]    Lakesha Brunner        Group Therapy Note    Attendees: 6/11         Patient's Goal:  To play one of the provided games or puzzles. Notes:  Patient actively participated in group.     Status After Intervention:  Unchanged    Participation Level: Interactive    Participation Quality: Appropriate and Attentive      Speech:  normal      Thought Process/Content: Logical      Affective Functioning: Flat      Mood: euthymic      Level of consciousness:  Alert and Attentive      Response to Learning: Progressing to goal      Endings: None Reported    Modes of Intervention: Activity      Discipline Responsible: [address], [employer]      Signature:  Lakesha Brunner

## 2021-07-25 NOTE — PROGRESS NOTES
Pt noted to have refused am dose of gabapentin. States, \"I only take that a bed time\". Explains she is attempting to wean herself off this medication.

## 2021-07-25 NOTE — FLOWSHEET NOTE
Pt denies SI/HI/AVH. She denies depression and anxiety. She was evasive in the afternoon when approached by staff. She is observed anxious at times with flight of ideas. Pt has decreased energy when compared to earlier in the day. She has been resting in bed reading.

## 2021-07-25 NOTE — PROGRESS NOTES
BEHAVIORAL HEALTH FOLLOW-UP NOTE     7/25/2021    [x] Patient was seen and examined in person  [x] Chart reviewed  [x] Labs reviewed  [x] Patient's case discussed with staff/team    Chief Complaint: \"I'm great\"  Interim History:   Patient continues to be hypomanic (seems less manic though than yesterday), circumstantial and tangential. She said she feels 'wonderful'. She slept well, and took Ativan early; however she did not take the Joey Burner last evening with dinner because she thought it would make her get sleepy too soon, and when she did want to take it, she was told it was too late. She said she did sleep though; woke up once and read some. She said she was concerned about the weekend that there would be people admitted who would be agitated and was afraid, but was relieved to see that was not the case. She said her mood was otherwise 'excellent'. She denied suicidal or homicidal ideation. She denied hallucinations.     Appetite:  [x] Normal/Unchanged  [] Increased  [] Decreased    Sleep: Reported to be interrupted but overall good    Suicidal ideation: [] Present  [x] denies     Plan []Present  [] Absent  [x]  N/A  Homicidal ideation: []Present  [x] denies  Energy:    [] Normal/Unchanged  [x] Increased  [] Decreased       Patient is [] able  [x]  unable to CONTRACT FOR SAFETY     Medication side effects(SE):  None     Examination:  /85   Pulse 89   Temp 98.2 °F (36.8 °C)   Resp 16   Ht 5' 7\" (1.702 m)   Wt 160 lb (72.6 kg)   SpO2 96%   BMI 25.06 kg/m²   Appearance:casual, in hospital attire   alert, cooperative  Speech    spontaneous and normal rate  Mood    \"great\"   Affect  Elated, Mildly irritable, anxious  Thought Content   Denies paranoia or other delusions, denies suicidal or homicidal ideation  Perception: denies auditory or visual hallucinations, not responding to internal stimuli    Thought Process    tangential and circumstantial  Associations   Able to make some logical Stress:     Feeling of Stress :    Social Connections:     Frequency of Communication with Friends and Family:     Frequency of Social Gatherings with Friends and Family:     Attends Orthodox Services:     Active Member of Clubs or Organizations:     Attends Club or Organization Meetings:     Marital Status:    Intimate Partner Violence:     Fear of Current or Ex-Partner:     Emotionally Abused:     Physically Abused:     Sexually Abused:      LABS:  Lab Results   Component Value Date     07/19/2021    BUN 9 07/19/2021    CREATININE 0.82 07/19/2021    TSH 2.060 07/19/2021    WBC 5.7 07/19/2021     No results found for: PHENYTOIN, PHENOBARB, VALPROATE, CBMZ  Lab Results   Component Value Date    INR 1.0 01/28/2016    PROTIME 9.3 (L) 01/28/2016     Lab Results   Component Value Date    APTT 25.0 01/28/2016     No results for input(s): ETOH in the last 72 hours. ROS:  [x]  All negative/unchanged except if checked.  Explain positive(checked items) below:  [] Constitutional  [] Eyes  [] Ear/Nose/Mouth/Throat  [] Respiratory  [] CV  [] GI  []   [] Musculoskeletal  [] Skin/Breast  [] Neurological  [] Endocrine  [] Heme/Lymph  [] Allergic/Immunologic    Explanation:     MEDICATIONS:    Current Facility-Administered Medications:     ibuprofen (ADVIL;MOTRIN) tablet 400 mg, 400 mg, Oral, Q6H PRN, Long Diaz MD, 400 mg at 07/25/21 1618    lurasidone (LATUDA) tablet 20 mg, 20 mg, Oral, Sundeep Ndiaye MD, 20 mg at 07/23/21 1749    vitamin D (CHOLECALCIFEROL) tablet 2,000 Units, 2,000 Units, Oral, Daily, Sheryle Rua, APRN - NP, 2,000 Units at 07/25/21 0816    SUMAtriptan (IMITREX) tablet 100 mg, 100 mg, Oral, Daily PRN, Sheryle Rua, APRN - NP    triamterene-hydroCHLOROthiazide (MAXZIDE-25) 37.5-25 MG per tablet 1 tablet, 1 tablet, Oral, Daily, Sheryle Rua, APRN - NP, 1 tablet at 07/25/21 0816    levothyroxine (SYNTHROID) tablet 50 mcg, 50 mcg, Oral, Daily, Sheyla Loa IDALMIS Pfeiffer - NP, 50 mcg at 07/25/21 0631    anastrozole (ARIMIDEX) tablet 1 mg, 1 mg, Oral, Daily, Ty Winter PA-C, 1 mg at 07/25/21 0906    haloperidol lactate (HALDOL) injection 5 mg, 5 mg, Intramuscular, Q6H PRN **OR** haloperidol (HALDOL) tablet 5 mg, 5 mg, Oral, Q6H PRN, Fe Parham MD    hydrOXYzine (VISTARIL) capsule 50 mg, 50 mg, Oral, Q6H PRN, 50 mg at 07/25/21 1006 **OR** hydrOXYzine (VISTARIL) injection 50 mg, 50 mg, Intramuscular, Q6H PRN, Fe Parham MD    acetaminophen (TYLENOL) tablet 650 mg, 650 mg, Oral, Q4H PRN, Fe Parham MD, 650 mg at 07/23/21 2039    traZODone (DESYREL) tablet 50 mg, 50 mg, Oral, Nightly PRN, Fe Parham MD, 50 mg at 07/22/21 2100    benztropine mesylate (COGENTIN) injection 2 mg, 2 mg, Intramuscular, BID PRN, Fe Parham MD    magnesium hydroxide (MILK OF MAGNESIA) 400 MG/5ML suspension 30 mL, 30 mL, Oral, Daily PRN, Fe Parham MD    nicotine (NICODERM CQ) 21 MG/24HR 1 patch, 1 patch, Transdermal, Daily, Fe Parham MD, 1 patch at 07/25/21 0815    gabapentin (NEURONTIN) capsule 300 mg, 300 mg, Oral, TID, Fe Parham MD, 300 mg at 07/24/21 2118    LORazepam (ATIVAN) tablet 2 mg, 2 mg, Oral, QPM, Fe Parham MD, 2 mg at 07/24/21 1808    LORazepam (ATIVAN) tablet 1 mg, 1 mg, Oral, QAM, Fe Parham MD, 1 mg at 07/25/21 0816    LORazepam (ATIVAN) tablet 1 mg, 1 mg, Oral, Lunch, Fe Parham MD, 1 mg at 07/25/21 1214    PSYCHOTHERAPY/COUNSELING:  [x] Therapeutic interview  [x] Supportive  [] CBT  [x] Ongoing  [] Other    ASSESSMENT: Continues to be tangential, circumstantial, but is showing improvement in mood and is increasingly more redirectable. Tolerating medication well. She is denying symptoms, but she has limited insight and is very much preoccupied with discharge therefore she may be minimizing.  The Neuro workup did not support at this time a suspicion of Fronto-temporal dementia. EXAMINATION:  MRI BRAIN WO CONTRAST       HISTORY:   encephalopathy, frontotemporal dementia         TECHNIQUE:  MRI brain routine protocol without contrast.       COMPARISON:  MRI brain 9/8/2014       RESULT:       Acute Change:  No evidence of an acute intracranial process.        Hemorrhage:  No evidence of prior parenchymal hemorrhage on the susceptibility weighted sequences.       Mass Lesion/ Mass Effect:  No evidence of an intracranial mass or extra-axial fluid collection.  No significant mass effect.       Chronic Change:  Scattered punctate foci of increased T2 and FLAIR signal are noted in the supratentorial white matter which is a nonspecific finding, but likely represents minimal chronic microvascular ischemia.        Parenchyma:  No significant parenchymal volume loss for age.   Tre Leslie    Ventricles:  Normal caliber and morphology.       Skull Base:  Hypothalamic and pituitary region are grossly normal. Craniocervical junction is normal. No significant marrow replacement process.       Vasculature:  Apparent loss of flow related signal within the right distal IJ, sigmoid and transverse sinuses may be related to slow flow or thrombosis. Otherwise major intracranial arteries and dural venous sinuses demonstrate normal flow voids.       Other:  The paranasal sinuses and mastoid air cells are clear.  The orbits and extracranial soft tissues are unremarkable.             Impression       1.  Apparent loss of flow-related signal within the right distal IJ, sigmoid and transverse sinuses may be related to slow flow or sinus thrombosis. If there is clinical concern for thrombosis, a CTV or MRV may be obtained. 2.  Otherwise no acute intracranial abnormality.          [x] Patient continues to need, on a daily basis, active treatment furnished directly by or requiring the supervision of inpatient psychiatric personnel     Diagnosis:  Bipolar disorder, manic    Treatment Plan:      DATE and changes made  7/20/21  lorazepam (Ativan) 1 mg, 1 mg, 2 mg every night   Gabapentin (Neurontin) 300 mg three times daily  7/21/21  lorazepam (Ativan) 1 mg, 1 mg, 2 mg every night   Gabapentin (Neurontin) 300 mg three times daily   7/22/21  lorazepam (Ativan) 1 mg, 1 mg, 2 mg every night   Gabapentin (Neurontin) 300 mg three times daily   START lurasidone (Latuda) 20 mg daily   MRI results pending   EEG pending   Folate 15.8 and B12 547 (within normal limits)       [x] Continue Current Medications  [x] Continue Follow-up and coordination of outpatient care in preparation for discharge from the hospital   [x] Continue Labs  [x] Risks, benefits, side effects, drug-to-drug interactions and alternatives to treatment were discussed in my usual manner.     Current medications:     Current Facility-Administered Medications:     ibuprofen (ADVIL;MOTRIN) tablet 400 mg, 400 mg, Oral, Q6H PRN, Violeta Hernandez MD, 400 mg at 07/25/21 1618    lurasidone (LATUDA) tablet 20 mg, 20 mg, Oral, Sirena Morales MD, 20 mg at 07/23/21 1749    vitamin D (CHOLECALCIFEROL) tablet 2,000 Units, 2,000 Units, Oral, Daily, IDALMIS Deng - NP, 2,000 Units at 07/25/21 0816    SUMAtriptan (IMITREX) tablet 100 mg, 100 mg, Oral, Daily PRN, Jewell Mata APRN - NP    triamterene-hydroCHLOROthiazide (MAXZIDE-25) 37.5-25 MG per tablet 1 tablet, 1 tablet, Oral, Daily, Jewell Mata APRN - NP, 1 tablet at 07/25/21 0816    levothyroxine (SYNTHROID) tablet 50 mcg, 50 mcg, Oral, Daily, Jewell Mata APRN - NP, 50 mcg at 07/25/21 0631    anastrozole (ARIMIDEX) tablet 1 mg, 1 mg, Oral, Daily, Carmel Winter PA-C, 1 mg at 07/25/21 2846    haloperidol lactate (HALDOL) injection 5 mg, 5 mg, Intramuscular, Q6H PRN **OR** haloperidol (HALDOL) tablet 5 mg, 5 mg, Oral, Q6H PRN, Janette Estes MD    hydrOXYzine (VISTARIL) capsule 50 mg, 50 mg, Oral, Q6H PRN, 50 mg at 07/25/21 1006 **OR** hydrOXYzine (VISTARIL) injection 50 mg, 50 mg, Intramuscular, Q6H PRN, Khadijah Hughes MD    acetaminophen (TYLENOL) tablet 650 mg, 650 mg, Oral, Q4H PRN, Khadijah Hughes MD, 650 mg at 07/23/21 2039    traZODone (DESYREL) tablet 50 mg, 50 mg, Oral, Nightly PRN, Khadijah Hughes MD, 50 mg at 07/22/21 2100    benztropine mesylate (COGENTIN) injection 2 mg, 2 mg, Intramuscular, BID PRN, Khadijah Hughes MD    magnesium hydroxide (MILK OF MAGNESIA) 400 MG/5ML suspension 30 mL, 30 mL, Oral, Daily PRN, Khadijah Hughes MD    nicotine (NICODERM CQ) 21 MG/24HR 1 patch, 1 patch, Transdermal, Daily, Khadijah Hughes MD, 1 patch at 07/25/21 0815    gabapentin (NEURONTIN) capsule 300 mg, 300 mg, Oral, TID, Khadijah Hughes MD, 300 mg at 07/24/21 2118    LORazepam (ATIVAN) tablet 2 mg, 2 mg, Oral, QPM, Khadijah Hughes MD, 2 mg at 07/24/21 1808    LORazepam (ATIVAN) tablet 1 mg, 1 mg, Oral, QAM, Khadijah Hughes MD, 1 mg at 07/25/21 0816    LORazepam (ATIVAN) tablet 1 mg, 1 mg, Oral, Lunch, Khadijah Hughes MD, 1 mg at 07/25/21 1214    ASSESSMENT/PLAN: NO CHANGES TO MEDICATIONS: Patient doing well on current medication regimen which is being titrated under the supervision of the physician. At this time, while patient continues to exhibit symptoms, we will not change medications and will continue to monitor for continued improvement. No acute concerns voiced.      Reason for more than one antipsychotic:  [x] N/A   [] 3 failed monotherapy(drugs tried):  [] Cross over to a new antipsychotic  [] Taper to monotherapy from polypharmacy  [] Augmentation of Clozapine therapy due to treatment resistance to single therapy      Electronically signed by Lyudmila Lu MD on 7/25/2021 at 4:21 PM

## 2021-07-25 NOTE — FLOWSHEET NOTE
Pt denies SI/HI/AVH. She also denies depression and anxiety. When asked about mood she states \"I'm fine\" She is observed anxious and hypomanic with rapid, slightly pressured speech and flight of ideas. She reports good appetite and sleep pattern. She attended morning meeting but did not attend AM activities group.

## 2021-07-25 NOTE — PROGRESS NOTES
P at nurses station requesting vistaril and ibuprofen. Admits to a headache r/t weather change. Currently in day room socializing with peers.  Administered at 1006 per request.

## 2021-07-25 NOTE — PROGRESS NOTES
Patient did not attend group despite staff encouragement.   Electronically signed by Debora Bella on 7/25/2021 at 5:19 PM

## 2021-07-25 NOTE — GROUP NOTE
Group Therapy Note    Date: 7/25/2021    Group Start Time: 1100  Group End Time: 9202  Group Topic: Group Therapy    [facility]    ERNST Ballard        Group Therapy Note    Attendees: 7         Patient's Goal:  To participate in a goal oriented group. Notes:  Patient stated her goal is to go home. She stated she has completed her healing process and it is time for her to return home. Status After Intervention:  Improved    Participation Level: Active Listener    Participation Quality: Appropriate      Speech:  normal      Thought Process/Content: Logical      Affective Functioning: Congruent      Mood: elevated      Level of consciousness:  Alert      Response to Learning: Able to verbalize current knowledge/experience      Endings: None Reported    Modes of Intervention: Education      Discipline Responsible: /Counselor      Signature:   ERNST Ballard

## 2021-07-26 PROCEDURE — 6370000000 HC RX 637 (ALT 250 FOR IP): Performed by: PSYCHIATRY & NEUROLOGY

## 2021-07-26 PROCEDURE — 6370000000 HC RX 637 (ALT 250 FOR IP): Performed by: NURSE PRACTITIONER

## 2021-07-26 PROCEDURE — 1240000000 HC EMOTIONAL WELLNESS R&B

## 2021-07-26 PROCEDURE — 6370000000 HC RX 637 (ALT 250 FOR IP): Performed by: PHYSICIAN ASSISTANT

## 2021-07-26 PROCEDURE — 99232 SBSQ HOSP IP/OBS MODERATE 35: CPT | Performed by: PSYCHIATRY & NEUROLOGY

## 2021-07-26 RX ORDER — GABAPENTIN 400 MG/1
400 CAPSULE ORAL 3 TIMES DAILY
Status: DISCONTINUED | OUTPATIENT
Start: 2021-07-26 | End: 2021-07-29 | Stop reason: HOSPADM

## 2021-07-26 RX ORDER — LORAZEPAM 1 MG/1
1 TABLET ORAL EVERY EVENING
Status: DISCONTINUED | OUTPATIENT
Start: 2021-07-26 | End: 2021-07-27

## 2021-07-26 RX ADMIN — LEVOTHYROXINE SODIUM 50 MCG: 0.05 TABLET ORAL at 06:24

## 2021-07-26 RX ADMIN — ACETAMINOPHEN 650 MG: 325 TABLET ORAL at 13:41

## 2021-07-26 RX ADMIN — Medication 2000 UNITS: at 08:11

## 2021-07-26 RX ADMIN — IBUPROFEN 400 MG: 400 TABLET, FILM COATED ORAL at 01:56

## 2021-07-26 RX ADMIN — IBUPROFEN 400 MG: 400 TABLET, FILM COATED ORAL at 09:35

## 2021-07-26 RX ADMIN — LORAZEPAM 1 MG: 1 TABLET ORAL at 18:52

## 2021-07-26 RX ADMIN — GABAPENTIN 400 MG: 400 CAPSULE ORAL at 21:00

## 2021-07-26 RX ADMIN — LORAZEPAM 1 MG: 1 TABLET ORAL at 08:11

## 2021-07-26 RX ADMIN — TRIAMTERENE AND HYDROCHLOROTHIAZIDE 1 TABLET: 37.5; 25 TABLET ORAL at 08:11

## 2021-07-26 RX ADMIN — IBUPROFEN 400 MG: 400 TABLET, FILM COATED ORAL at 17:03

## 2021-07-26 RX ADMIN — LURASIDONE HYDROCHLORIDE 40 MG: 40 TABLET, FILM COATED ORAL at 17:04

## 2021-07-26 RX ADMIN — ANASTROZOLE 1 MG: 1 TABLET, COATED ORAL at 10:43

## 2021-07-26 RX ADMIN — HYDROXYZINE PAMOATE 50 MG: 50 CAPSULE ORAL at 21:01

## 2021-07-26 ASSESSMENT — PAIN SCALES - GENERAL
PAINLEVEL_OUTOF10: 5
PAINLEVEL_OUTOF10: 4
PAINLEVEL_OUTOF10: 2
PAINLEVEL_OUTOF10: 3
PAINLEVEL_OUTOF10: 6

## 2021-07-26 NOTE — GROUP NOTE
Group Therapy Note    Date: 7/26/2021    Group Start Time: 2233  Group End Time: 1330  Group Topic: Cognitive Skills    [facility]    KARIN Chung        Group Therapy Note    Attendees: 4/9         Patient's Goal:  \"to go home tomorrow and start working for [employer]. \"     Notes:  Monopolizing at times, playful with peers     Status After Intervention:  Improved    Participation Level: Monopolizing    Participation Quality: Intrusive      Speech:  pressured      Thought Process/Content: Flight of ideas      Affective Functioning: Exaggerated      Mood: anxious      Level of consciousness:  Preoccupied      Response to Learning: Progressing to goal      Endings: None Reported    Modes of Intervention: Education      Discipline Responsible: /Counselor      Signature:  KARIN Chung

## 2021-07-26 NOTE — PROGRESS NOTES
Early morning patient was demonstrating flight of ideas with raped pressured speech and irritability. Since mediated with vistaril  She has been social/ visible/ on the unit-- using phone to speak with / was slightly irritable on phone this afternoon but calmed self.  Presents a more goal directed conversation at this time and has attended groups

## 2021-07-26 NOTE — PROGRESS NOTES
Patient visible on unit, social with peers. Patient pleasant and cooperative. Pt tangential with pressured speech at times. Patient states she didn't sleep well and she believes it is from [place]. Pt states she will continue to take the latuda if she has to but she doesn't think she needs it. Pt reports good appetite. Pt denies SI, HI, and Hallucinations. Pt denies depression and anxiety. Pt states she has plans to follow up with her psychiatrist and she has a good support system at home.  Electronically signed by Marifer Severino RN on 7/26/21 at 4:19 PM EDT

## 2021-07-26 NOTE — CARE COORDINATION
Writer received call from pt's  Beata Olivo. Informed him that pt is not on list to discharge today. Provided him updates from weekend notes and that Dr. John Spencer found no signs of dementia. He is requesting that a meeting be completed prior to discharge so family can be aware of treatment suggestions, meds, and treatment plan. He is also requesting for md to write in discharge paperwork  That pt is not able to drive until outpatient md determines it is appropriate. Will call suzy tomorrow with update.  Electronically signed by KARIN Kraft on 7/26/2021 at 10:05 AM

## 2021-07-26 NOTE — PROGRESS NOTES
Pt noted to have refused this afternoons gabapentin once more. Asking for PRN tylenol for 3/10 headache. Administered at 1342 per request. Pt visible on unit. Laughing and joking with peers. Currently participating in group.

## 2021-07-26 NOTE — PROGRESS NOTES
BEHAVIORAL HEALTH FOLLOW-UP NOTE     7/26/2021    [x] Patient was seen and examined in person  [x] Chart reviewed  [x] Labs reviewed  [x] Patient's case discussed with staff/team    Chief Complaint: derrick    Interim History:     Pt report feeling great  Continue to remain manic with impulsive tendencies  Focused on discharge  Mood swings, irritable, circumstantial thinking with flights of ideas  Pt is on Ativan three times a day- which was planned to be tapered  Pt is not taking Latuda as prescribed    Appetite:  [x] Normal/Unchanged  [] Increased  [] Decreased    Sleep: disturbed  Suicidal ideation: [] Present  [x] denies     Plan []Present  [] Absent  [x]  N/A  Homicidal ideation: []Present  [x] denies  Energy:    [] Normal/Unchanged  [x] Increased  [] Decreased       Patient is [] able  [x]  unable to CONTRACT FOR SAFETY     Medication side effects(SE):  None     Examination:  BP (!) 143/91   Pulse 90   Temp 98.6 °F (37 °C)   Resp 18   Ht 5' 7\" (1.702 m)   Wt 160 lb (72.6 kg)   SpO2 99%   BMI 25.06 kg/m²   Appearance:casual, in hospital attire   alert, cooperative  Speech    spontaneous and normal rate  Mood    hypomanic  Affect  Elated, Mildly irritable, anxious  Thought Content   Denies paranoia or other delusions, denies suicidal or homicidal ideation  Perception: denies auditory or visual hallucinations, not responding to internal stimuli    Thought Process    tangential and circumstantial  Associations   Able to make some logical connections  Insight poor  Judgment  Impaired  Orientation    oriented to person, place, time, and general circumstances  Memory   recent and remote memory intact  Attention/Concentration fair    7/26/2021    PAST MEDICAL/PSYCHIATRIC HISTORY:   Past Medical History:   Diagnosis Date    Anxiety     Cancer ([facility] [address] 05/10/2017    DCIS left breast    Depression     Manic depression ([facility] [address]     Migraines     Neuropathy     Thyroid disease      FAMILY/SOCIAL HISTORY:  Family History   Problem Relation Age of Onset    High Blood Pressure Mother     High Cholesterol Mother     Cancer Father         brain cancer    Stroke Father     Other Father         ulcerative colitis    High Blood Pressure Father     Cancer Sister         uterine cancer    High Blood Pressure Sister     High Blood Pressure Brother      Social History     Socioeconomic History    Marital status:      Spouse name: Not on file    Number of children: Not on file    Years of education: Not on file    Highest education level: Not on file   Occupational History    Not on file   Tobacco Use    Smoking status: Current Every Day Smoker     Packs/day: 1.00     Years: 13.00     Pack years: 13.00     Types: Cigarettes    Smokeless tobacco: Never Used    Tobacco comment: start age 10   Vaping Use    Vaping Use: Never used   Substance and Sexual Activity    Alcohol use: Yes     Comment: occasional     Drug use: No    Sexual activity: Yes   Other Topics Concern    Not on file   Social History Narrative    Not on file     Social Determinants of Health     Financial Resource Strain:     Difficulty of Paying Living Expenses:    Food Insecurity:     Worried About Running Out of Food in the Last Year:     Ran Out of Food in the Last Year:    Transportation Needs:     Lack of Transportation (Medical):      Lack of Transportation (Non-Medical):    Physical Activity:     Days of Exercise per Week:     Minutes of Exercise per Session:    Stress:     Feeling of Stress :    Social Connections:     Frequency of Communication with Friends and Family:     Frequency of Social Gatherings with Friends and Family:     Attends Adventism Services:     Active Member of Clubs or Organizations:     Attends Club or Organization Meetings:     Marital Status:    Intimate Partner Violence:     Fear of Current or Ex-Partner:     Emotionally Abused:     Physically Abused:     Sexually Abused:      LABS:  Lab Results Component Value Date     07/19/2021    BUN 9 07/19/2021    CREATININE 0.82 07/19/2021    TSH 2.060 07/19/2021    WBC 5.7 07/19/2021     No results found for: PHENYTOIN, PHENOBARB, VALPROATE, CBMZ  Lab Results   Component Value Date    INR 1.0 01/28/2016    PROTIME 9.3 (L) 01/28/2016     Lab Results   Component Value Date    APTT 25.0 01/28/2016     No results for input(s): ETOH in the last 72 hours. ROS:  [x]  All negative/unchanged except if checked.  Explain positive(checked items) below:  [] Constitutional  [] Eyes  [] Ear/Nose/Mouth/Throat  [] Respiratory  [] CV  [] GI  []   [] Musculoskeletal  [] Skin/Breast  [] Neurological  [] Endocrine  [] Heme/Lymph  [] Allergic/Immunologic    Explanation:     MEDICATIONS:    Current Facility-Administered Medications:     LORazepam (ATIVAN) tablet 1 mg, 1 mg, Oral, QPM, Giovana Nelson MD    lurasidone (LATUDA) tablet 40 mg, 40 mg, Oral, Dinner, Giovana Nelson MD    gabapentin (NEURONTIN) capsule 400 mg, 400 mg, Oral, TID, Giovana Nelson MD    ibuprofen (ADVIL;MOTRIN) tablet 400 mg, 400 mg, Oral, Q6H PRN, Beata Ritter MD, 400 mg at 07/26/21 0935    vitamin D (CHOLECALCIFEROL) tablet 2,000 Units, 2,000 Units, Oral, Daily, IDALMIS Mckenzie NP, 2,000 Units at 07/26/21 0811    SUMAtriptan (IMITREX) tablet 100 mg, 100 mg, Oral, Daily PRN, IDALMIS Mckenzie NP    triamterene-hydroCHLOROthiazide (MAXZIDE-25) 37.5-25 MG per tablet 1 tablet, 1 tablet, Oral, Daily, IDALMIS Mckenzie NP, 1 tablet at 07/26/21 2865    levothyroxine (SYNTHROID) tablet 50 mcg, 50 mcg, Oral, Daily, IDALMIS Mckenzie NP, 50 mcg at 07/26/21 1697    anastrozole (ARIMIDEX) tablet 1 mg, 1 mg, Oral, Daily, Navin Winter PA-C, 1 mg at 07/25/21 1033    haloperidol lactate (HALDOL) injection 5 mg, 5 mg, Intramuscular, Q6H PRN **OR** haloperidol (HALDOL) tablet 5 mg, 5 mg, Oral, Q6H PRN, Johanna Granda MD    hydrOXYzine (VISTARIL) capsule 50 mg, 50 mg, Oral, Q6H PRN, 50 mg at 07/25/21 2339 **OR** hydrOXYzine (VISTARIL) injection 50 mg, 50 mg, Intramuscular, Q6H PRN, Yasir Chong MD    acetaminophen (TYLENOL) tablet 650 mg, 650 mg, Oral, Q4H PRN, Yasir Chong MD, 650 mg at 07/23/21 2039    traZODone (DESYREL) tablet 50 mg, 50 mg, Oral, Nightly PRN, Yasir Chong MD, 50 mg at 07/22/21 2100    benztropine mesylate (COGENTIN) injection 2 mg, 2 mg, Intramuscular, BID PRN, Yasir Chong MD    magnesium hydroxide (MILK OF MAGNESIA) 400 MG/5ML suspension 30 mL, 30 mL, Oral, Daily PRN, Yasir Chong MD    nicotine (NICODERM CQ) 21 MG/24HR 1 patch, 1 patch, Transdermal, Daily, Yasir Chong MD, 1 patch at 07/26/21 0810    LORazepam (ATIVAN) tablet 1 mg, 1 mg, Oral, QAM, Yasir Chong MD, 1 mg at 07/26/21 7434    PSYCHOTHERAPY/COUNSELING:  [x] Therapeutic interview  [x] Supportive  [] CBT  [x] Ongoing  [] Other    ASSESSMENT: Continues to be tangential, circumstantial, but is showing improvement in mood and is increasingly more redirectable. Tolerating medication well. She is denying symptoms, but she has limited insight and is very much preoccupied with discharge therefore she may be minimizing. The Neuro workup did not support at this time a suspicion of Fronto-temporal dementia.     EXAMINATION:  MRI BRAIN WO CONTRAST       HISTORY:   encephalopathy, frontotemporal dementia         TECHNIQUE:  MRI brain routine protocol without contrast.       COMPARISON:  MRI brain 9/8/2014       RESULT:       Acute Change:  No evidence of an acute intracranial process.        Hemorrhage:  No evidence of prior parenchymal hemorrhage on the susceptibility weighted sequences.       Mass Lesion/ Mass Effect:  No evidence of an intracranial mass or extra-axial fluid collection.  No significant mass effect.       Chronic Change:  Scattered punctate foci of increased T2 and FLAIR signal are noted in the supratentorial white matter which is a nonspecific finding, but likely represents minimal chronic microvascular ischemia.        Parenchyma:  No significant parenchymal volume loss for age.   Aleene Bourgeois    Ventricles:  Normal caliber and morphology.       Skull Base:  Hypothalamic and pituitary region are grossly normal. Craniocervical junction is normal. No significant marrow replacement process.       Vasculature:  Apparent loss of flow related signal within the right distal IJ, sigmoid and transverse sinuses may be related to slow flow or thrombosis. Otherwise major intracranial arteries and dural venous sinuses demonstrate normal flow voids.       Other:  The paranasal sinuses and mastoid air cells are clear.  The orbits and extracranial soft tissues are unremarkable.             Impression       1.  Apparent loss of flow-related signal within the right distal IJ, sigmoid and transverse sinuses may be related to slow flow or sinus thrombosis. If there is clinical concern for thrombosis, a CTV or MRV may be obtained. 2.  Otherwise no acute intracranial abnormality.          [x] Patient continues to need, on a daily basis, active treatment furnished directly by or requiring the supervision of inpatient psychiatric personnel     Diagnosis:  Bipolar disorder, manic  Benzo dependence    Treatment Plan:      DATE and changes made  7/20/21  lorazepam (Ativan) 1 mg, 1 mg, 2 mg every night   Gabapentin (Neurontin) 300 mg three times daily  7/21/21  lorazepam (Ativan) 1 mg, 1 mg, 2 mg every night   Gabapentin (Neurontin) 300 mg three times daily   7/22/21  lorazepam (Ativan) 1 mg, 1 mg, 2 mg every night   Gabapentin (Neurontin) 300 mg three times daily   START lurasidone (Latuda) 20 mg daily   MRI results pending   EEG pending   Folate 15.8 and B12 547 (within normal limits)       7/26/21: Decrease Ativan to taper and stop it  Increase Neurontin to 400 mg tid  Increase Latuda to 40 mg po q daily      [x] Continue Current Medications  [x] Continue Follow-up

## 2021-07-26 NOTE — PROGRESS NOTES
Pt attended the 0900 morning community meeting.      Electronically signed by Satish Barnes OT on 7/26/2021 at 9:22 AM

## 2021-07-26 NOTE — PROGRESS NOTES
Patient did not attend group despite staff encouragement. Patient walked into group for a minute before leaving.    Electronically signed by Kyra Giang on 7/25/2021 at 8:21 PM

## 2021-07-26 NOTE — PROGRESS NOTES
Patient did not attend group despite staff encouragement.   Electronically signed by Vilma Soni on 7/25/2021 at 9:47 PM

## 2021-07-26 NOTE — PROGRESS NOTES
Called PCP to discuss  Her medications-- remains upset about change in ativan order but attending groups and calmly discussing her plans.   She does went to return to work asap

## 2021-07-26 NOTE — CARE COORDINATION
Patient medicated with vistaril per her request for anxiety-- patient upset and reports feeling anxious due to not going home today---- requesting to go to sensory room then decided that she would us phone.    reports  that she is not going to attend any more groups  Feels that she has been on ativan for many years and the doctor is taking her off of this

## 2021-07-26 NOTE — PROGRESS NOTES
Pt accepted PRN ibuprofen for complaint of 5/10 headache pain.  Electronically signed by Miranda Rosario RN on 7/26/21 at 5:05 PM EDT

## 2021-07-26 NOTE — PROGRESS NOTES
Pt continues to refuse more gabapentin. At nurses statin requesting PRN ibuprofen for 4/10 headache. Pt currently in day room socializing with peers.

## 2021-07-26 NOTE — PROGRESS NOTES
Patient did not attend the 10:00 activity group despite staff encouragement.     Electronically signed by Colin Howell OT on 7/26/2021 at 11:21 AM

## 2021-07-26 NOTE — GROUP NOTE
Group Therapy Note    Date: 7/26/2021    Group Start Time: 6699  Group End Time: 1464  Group Topic: Healthy Living/Wellness    [facility]    Ella Helm        Group Therapy Note    Attendees: 3/10         Patient's Goal:  To learn about keys to happier living and participate in discussion. Notes:  Patient actively participated in conversation. Patient got group off topic several times and interrupted throughout group. Patient seemed to avoid questions being asked and run off on a tangent, usually off-topic. Status After Intervention:  Unchanged    Participation Level:  Active Listener, Interactive and Monopolizing    Participation Quality: Attentive, Sharing, Supportive and Intrusive      Speech:  pressured      Thought Process/Content: Flight of ideas      Affective Functioning: Exaggerated      Mood: elevated      Level of consciousness:  Alert and Attentive      Response to Learning: Able to verbalize current knowledge/experience and Able to verbalize/acknowledge new learning      Endings: None Reported    Modes of Intervention: Education      Discipline Responsible: [address], [employer]      Signature:  Ella Helm

## 2021-07-26 NOTE — CARE COORDINATION
Group Therapy Note    Date: 7/26/2021  Start Time: 1110  End Time:  3305    Number of Participants:3    Type of Group: Psychotherapy    Patient's Goal:  To participate in a goal oriented group. Notes: Patient declined to attend psychoeducation group at 1110 despite encouragement by staff.      Discipline Responsible: /Counselor    ERNST Kan

## 2021-07-27 PROCEDURE — 6370000000 HC RX 637 (ALT 250 FOR IP): Performed by: PHYSICIAN ASSISTANT

## 2021-07-27 PROCEDURE — 6370000000 HC RX 637 (ALT 250 FOR IP): Performed by: PSYCHIATRY & NEUROLOGY

## 2021-07-27 PROCEDURE — 1240000000 HC EMOTIONAL WELLNESS R&B

## 2021-07-27 PROCEDURE — 6370000000 HC RX 637 (ALT 250 FOR IP): Performed by: NURSE PRACTITIONER

## 2021-07-27 PROCEDURE — 99232 SBSQ HOSP IP/OBS MODERATE 35: CPT | Performed by: PSYCHIATRY & NEUROLOGY

## 2021-07-27 RX ORDER — ZOLPIDEM TARTRATE 5 MG/1
5 TABLET ORAL NIGHTLY
Status: DISCONTINUED | OUTPATIENT
Start: 2021-07-27 | End: 2021-07-29 | Stop reason: HOSPADM

## 2021-07-27 RX ORDER — LORAZEPAM 1 MG/1
1 TABLET ORAL NIGHTLY
Status: DISCONTINUED | OUTPATIENT
Start: 2021-07-27 | End: 2021-07-29 | Stop reason: HOSPADM

## 2021-07-27 RX ADMIN — HYDROXYZINE PAMOATE 50 MG: 50 CAPSULE ORAL at 03:03

## 2021-07-27 RX ADMIN — IBUPROFEN 400 MG: 400 TABLET, FILM COATED ORAL at 03:03

## 2021-07-27 RX ADMIN — TRIAMTERENE AND HYDROCHLOROTHIAZIDE 1 TABLET: 37.5; 25 TABLET ORAL at 08:40

## 2021-07-27 RX ADMIN — HYDROXYZINE PAMOATE 50 MG: 50 CAPSULE ORAL at 11:22

## 2021-07-27 RX ADMIN — IBUPROFEN 400 MG: 400 TABLET, FILM COATED ORAL at 19:28

## 2021-07-27 RX ADMIN — ZOLPIDEM TARTRATE 5 MG: 5 TABLET ORAL at 20:51

## 2021-07-27 RX ADMIN — ACETAMINOPHEN 650 MG: 325 TABLET ORAL at 15:36

## 2021-07-27 RX ADMIN — IBUPROFEN 400 MG: 400 TABLET, FILM COATED ORAL at 11:22

## 2021-07-27 RX ADMIN — HYDROXYZINE PAMOATE 50 MG: 50 CAPSULE ORAL at 17:39

## 2021-07-27 RX ADMIN — LORAZEPAM 1 MG: 1 TABLET ORAL at 08:40

## 2021-07-27 RX ADMIN — LEVOTHYROXINE SODIUM 50 MCG: 0.05 TABLET ORAL at 06:51

## 2021-07-27 RX ADMIN — ANASTROZOLE 1 MG: 1 TABLET, COATED ORAL at 08:40

## 2021-07-27 RX ADMIN — GABAPENTIN 400 MG: 400 CAPSULE ORAL at 20:51

## 2021-07-27 RX ADMIN — LURASIDONE HYDROCHLORIDE 40 MG: 40 TABLET, FILM COATED ORAL at 17:38

## 2021-07-27 RX ADMIN — Medication 2000 UNITS: at 08:40

## 2021-07-27 RX ADMIN — LORAZEPAM 1 MG: 1 TABLET ORAL at 20:51

## 2021-07-27 ASSESSMENT — PAIN SCALES - GENERAL
PAINLEVEL_OUTOF10: 4
PAINLEVEL_OUTOF10: 6
PAINLEVEL_OUTOF10: 8
PAINLEVEL_OUTOF10: 4

## 2021-07-27 NOTE — PROGRESS NOTES
Pt at nurses station crying and requesting ibuprofen and vistaril after meeting with MD. Medication administered per request.

## 2021-07-27 NOTE — CARE COORDINATION
Family/Support Name: Ankur Ruvalcaba #: [phone]  Relationship to Patient:      Placed call to above for collateral information,    Response: left voicemail requesting return call.  Want to inform him that a family session will be ordered closer to discharge and that md will put in the note that pt is not to drive until determined appropriate by outpatient MD. Electronically signed by KARIN Sloan on 7/27/2021 at 9:39 AM
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
## 2021-07-27 NOTE — PROGRESS NOTES
Pt continues to refuse morning gabapentin this morning. States, \"that Dr. Kirsten Dixon know what he is doing. Im just going to read until I get out of here\".

## 2021-07-27 NOTE — GROUP NOTE
Group Therapy Note    Date: 7/26/2021    Group Start Time: 1940  Group End Time: 2015  Group Topic: Recreational    [facility]    Gaetano Mckeon        Group Therapy Note    Attendees: 4/11         Patient's Goal:  To participate in a group activity    Notes:  Patient actively participated in group activity. Patient noted to be laughing and having an enjoyable time. Status After Intervention:  Improved    Participation Level:  Active Listener and Interactive    Participation Quality: Appropriate, Attentive and Supportive      Speech:  loud      Thought Process/Content: Logical      Affective Functioning: Congruent      Mood: elevated      Level of consciousness:  Alert and Attentive      Response to Learning: Progressing to goal      Endings: None Reported    Modes of Intervention: Activity      Discipline Responsible: [address], [employer]      Signature:  Gaetano Mckeon

## 2021-07-27 NOTE — PROGRESS NOTES
Behavioral Services                                              Medicare Re-Certification    I certify that the inpatient psychiatric hospital services furnished since the previous certification/re-certification were, and continue to be, medically necessary for;    [x] (1) Treatment which could reasonably be expected to improve the patient's condition,    [x] (2) Or for diagnostic study. Estimated length of stay/service 3 days    Plan for post-hospital care Op care    This patient continues to need, on a daily basis, active treatment furnished directly by or requiring the supervision of inpatient psychiatric personnel.     Electronically signed by Reyna Steven MD on 7/27/2021 at 10:56 AM                 [address] NOTE       7/27/2021     Patient was seen and examined in person, Chart reviewed   Patient's case discussed with staff/team    Chief Complaint: Irene    Interim History:     Continue to remain hypomanic with total sleep time of less than 3 hr last night  Hyperactive with FOI and disorganized thinking  Pt took latuda at 40 mg last night  Tolerating Ativan taper  No active W/D symptoms   requesting family meeting before discharge  Pt remain impulsive and irritable  Appetite:   [] Normal/Unchanged  [] Increased  [x] Decreased      Sleep:       [] Normal/Unchanged  [] Fair       [x] Poor              Energy:    [] Normal/Unchanged  [x] Increased  [] Decreased        SI [] Present  [] Absent    HI  []Present  [] Absent     Aggression:  [] yes  [] no    Patient is [] able  [x] unable to CONTRACT FOR SAFETY     PAST MEDICAL/PSYCHIATRIC HISTORY:   Past Medical History:   Diagnosis Date    Anxiety     Cancer ([facility] 05/10/2017    DCIS left breast    Depression     [facility] depression ([facility]     Migraines     Neuropathy     Thyroid disease        FAMILY/SOCIAL HISTORY:  Family History   Problem Relation Age of Onset    High Blood Pressure Mother    John Cliftonjanusz High Cholesterol Mother     Cancer Father         brain cancer    Stroke Father     Other Father         ulcerative colitis    High Blood Pressure Father     Cancer Sister         uterine cancer    High Blood Pressure Sister     High Blood Pressure Brother      Social History     Socioeconomic History    Marital status:      Spouse name: Not on file    Number of children: Not on file    Years of education: Not on file    Highest education level: Not on file   Occupational History    Not on file   Tobacco Use    Smoking status: Current Every Day Smoker     Packs/day: 1.00     Years: 13.00     Pack years: 13.00     Types: Cigarettes    Smokeless tobacco: Never Used    Tobacco comment: start age 10   Vaping Use    Vaping Use: Never used   Substance and Sexual Activity    Alcohol use: Yes     Comment: occasional     Drug use: No    Sexual activity: Yes   Other Topics Concern    Not on file   Social History Narrative    Not on file     Social Determinants of Health     Financial Resource Strain:     Difficulty of Paying Living Expenses:    Food Insecurity:     Worried About Running Out of Food in the Last Year:     Ran Out of Food in the Last Year:    Transportation Needs:     Lack of Transportation (Medical):  Lack of Transportation (Non-Medical):    Physical Activity:     Days of Exercise per Week:     Minutes of Exercise per Session:    Stress:     Feeling of Stress :    Social Connections:     Frequency of Communication with Friends and Family:     Frequency of Social Gatherings with Friends and Family:     Attends Sikhism Services:     Active Member of Clubs or Organizations:     Attends Club or Organization Meetings:     Marital Status:    Intimate Partner Violence:     Fear of Current or Ex-Partner:     Emotionally Abused:     Physically Abused:     Sexually Abused:            ROS:  [x] All negative/unchanged except if checked.  Explain positive(checked items) below:  [] Constitutional  [] Eyes  [] Ear/Nose/Mouth/Throat  [] Respiratory  [] CV  [] GI  []   [] Musculoskeletal  [] Skin/Breast  [] Neurological  [] Endocrine  [] Heme/Lymph  [] Allergic/Immunologic    Explanation:     MEDICATIONS:    Current Facility-Administered Medications:     LORazepam (ATIVAN) tablet 1 mg, 1 mg, Oral, QPM, Penelope Maloney MD, 1 mg at 07/26/21 1852    lurasidone (LATUDA) tablet 40 mg, 40 mg, Oral, Dinner, Penelope Maloney MD, 40 mg at 07/26/21 1704    gabapentin (NEURONTIN) capsule 400 mg, 400 mg, Oral, TID, Penelope Maloney MD, 400 mg at 07/26/21 2100    ibuprofen (ADVIL;MOTRIN) tablet 400 mg, 400 mg, Oral, Q6H PRN, Wellington Palacio MD, 400 mg at 07/27/21 0303    vitamin D (CHOLECALCIFEROL) tablet 2,000 Units, 2,000 Units, Oral, Daily, Arabella Lipps, APRN - NP, 2,000 Units at 07/27/21 0840    SUMAtriptan (IMITREX) tablet 100 mg, 100 mg, Oral, Daily PRN, Arabella Lipps, APRN - NP    triamterene-hydroCHLOROthiazide (MAXZIDE-25) 37.5-25 MG per tablet 1 tablet, 1 tablet, Oral, Daily, Arabella Lipps, APRN - NP, 1 tablet at 07/27/21 0840    levothyroxine (SYNTHROID) tablet 50 mcg, 50 mcg, Oral, Daily, Arabella Lipps, APRN - NP, 50 mcg at 07/27/21 3839    anastrozole (ARIMIDEX) tablet 1 mg, 1 mg, Oral, Daily, Wilver Winter PA-C, 1 mg at 07/27/21 0840    haloperidol lactate (HALDOL) injection 5 mg, 5 mg, Intramuscular, Q6H PRN **OR** haloperidol (HALDOL) tablet 5 mg, 5 mg, Oral, Q6H PRN, Janine Cid MD    hydrOXYzine (VISTARIL) capsule 50 mg, 50 mg, Oral, Q6H PRN, 50 mg at 07/27/21 0303 **OR** hydrOXYzine (VISTARIL) injection 50 mg, 50 mg, Intramuscular, Q6H PRN, Janine Cid MD    acetaminophen (TYLENOL) tablet 650 mg, 650 mg, Oral, Q4H PRN, Janine Cid MD, 650 mg at 07/26/21 1341    traZODone (DESYREL) tablet 50 mg, 50 mg, Oral, Nightly PRN, Janine Cid MD, 50 mg at 07/22/21 2100    benztropine mesylate (COGENTIN) injection 2 mg, 2 mg, Intramuscular, BID PRN, Fe Parham MD    magnesium hydroxide (MILK OF MAGNESIA) 400 MG/5ML suspension 30 mL, 30 mL, Oral, Daily PRN, Fe Parham MD    nicotine (NICODERM CQ) 21 MG/24HR 1 patch, 1 patch, Transdermal, Daily, Fe Parham MD, 1 patch at 07/27/21 0840    LORazepam (ATIVAN) tablet 1 mg, 1 mg, Oral, QAM, Fe Parham MD, 1 mg at 07/27/21 0840      Examination:  /68   Pulse 90   Temp 98.2 °F (36.8 °C) (Oral)   Resp 18   Ht 5' 7\" (1.702 m)   Wt 160 lb (72.6 kg)   SpO2 94%   BMI 25.06 kg/m²   Gait - steady  Medication side effects(SE): no    Mental Status Examination:    Level of consciousness:  within normal limits   Appearance:  poor grooming and poor hygiene  Behavior/Motor:  hyperactive  Attitude toward examiner:  playful  Speech:  hyperverbal   Mood: euphoric  Affect:  mood congruent  Thought processes:  flight of ideas, tangential and circumstantial   Thought content:  Suicidal Ideation:  denies suicidal ideation  Cognition:  oriented to person, place, and time   Concentration poor  Insight poor   Judgement poor     ASSESSMENT:   Patient symptoms are:  [] Well controlled  [] Improving  [] Worsening  [] No change      Diagnosis:   Active Problems:    [facility]  Resolved Problems:    * No resolved hospital problems. *      LABS:    No results for input(s): WBC, HGB, PLT in the last 72 hours. No results for input(s): NA, K, CL, CO2, BUN, CREATININE, GLUCOSE in the last 72 hours. No results for input(s): BILITOT, ALKPHOS, AST, ALT in the last 72 hours.   Lab Results   Component Value Date    LABAMPH Neg 07/19/2021    BARBSCNU Neg 07/19/2021    LABBENZ Neg 07/19/2021    LABMETH Neg 07/19/2021    OPIATESCREENURINE Neg 07/19/2021    PHENCYCLIDINESCREENURINE Neg 07/19/2021    ETOH <10 07/19/2021     Lab Results   Component Value Date    TSH 2.060 07/19/2021     No results found for: LITHIUM  No results found for: VALPROATE, CBMZ      Treatment Plan:  Reviewed current Medications with the patient. Ambien added  Continue ativan taper  Family meeting arranged for tomorrow  Risks, benefits, side effects, drug-to-drug interactions and alternatives to treatment were discussed. Collateral information:  CD evaluation  Encourage patient to attend group and other milieu activities.   Discharge planning discussed with the patient and treatment team.    PSYCHOTHERAPY/COUNSELING:  [x] Therapeutic interview  [x] Supportive  [] CBT  [] Ongoing  [] Other    [x] Patient continues to need, on a daily basis, active treatment furnished directly by or requiring the supervision of inpatient psychiatric personnel      Anticipated Length of stay: on Thursday discharge if stable            Electronically signed by Bertrand Lanier MD on 7/27/2021 at 10:56 AM

## 2021-07-27 NOTE — GROUP NOTE
Group Therapy Note    Date: 7/27/2021    Group Start Time: 1000  Group End Time: 1050  Group Topic: Psychoeducation    [facility]    Viry Fernando        Group Therapy Note    Attendees: 6         Patient's Goal:  \"To go home\"    Notes:  Patient was talkative, sociable and shared openly with her peers in group. She work adequately on her task.     Status After Intervention:  Unchanged    Participation Level: Adequate    Participation Quality: Appropriate      Speech:  talkative      Thought Process/Content: Linear      Affective Functioning: Exaggerated      Mood: anxious      Level of consciousness:  Preoccupied      Response to Learning: Progressing to goal      Endings: None Reported    Modes of Intervention: Education, Socialization and Activity      Discipline Responsible: Psychoeducational Specialist      Signature:  Viry Fernando

## 2021-07-27 NOTE — CARE COORDINATION
Writer spoke with pt's  and confirmed a family meeting at [place] [address] 7/28/21. This will include pt, Hershall Cancel, her , and son. Writer will contact  and he will be with son. We will review treatment recommendations, MD recommendations, potential discharge date, and safety planning.  Electronically signed by KARIN Boyce on 7/27/2021 at 11:32 AM

## 2021-07-27 NOTE — PROGRESS NOTES
Patient at nurse's station with complaints of tooth pain and anxiety. Requesting Motrin and Vistaril. Medicated as ordered. Patient states \"the medicine that Dr is giving me is not helping me at all, he decreased my Ativan and now this. I'm gonna go home and not take it anyways so I need to just leave today. My Dr knows me very well and knows I have been on Ativan for 26 years so she is the one that she be dealing with this not him. \"

## 2021-07-27 NOTE — PROGRESS NOTES
Pt accepted PRN vistaril for anxiety. Pt states she's not happy her ativan was decreased and her Neurontin was increased. Pt states she wants to wean herself off of the Neurontin. Pt states she's not happy that a female peer's room was moved closer to hers because she is worried female peer will threaten her or scream at her. Pt states \"I'm strong as an ox. \" Pt contracts for safety on unit. This nurse offered to moved pt's room if she feels uncomfortable and pt declined, stating \"I don't plan on sleeping tonight anyway because he changed my ativan. I'm going to read my book all night. \" This nurse encouraged pt to sleep and to let staff know if she would feel more comfortable in a different room. Pt continues to decline room switch and insists she plans on staying awake all night.  Electronically signed by Roe Vu RN on 7/26/21 at 9:05 PM EDT

## 2021-07-27 NOTE — GROUP NOTE
Group Therapy Note    Date: 7/27/2021    Group Start Time: 1630  Group End Time: 1700  Group Topic: Healthy Living/Wellness    [facility]    Enolia Bird        Group Therapy Note    Attendees: 5/14     Patient's Goal: To participate in discussion about happiness and talk about the ten keys to create a happier life. Notes: Pt participated in group discussion and participated in word game with the keys.        Status After Intervention:  Unchanged    Participation Level: Interactive and Monopolizing    Participation Quality: Sharing and Intrusive      Speech:  normal      Thought Process/Content: Logical      Affective Functioning: Congruent      Mood: euthymic      Level of consciousness:  Alert and Oriented x4      Response to Learning: Able to verbalize current knowledge/experience      Endings: None Reported    Modes of Intervention: Education      Discipline Responsible: [address] [employer]      Signature:  Karthik Danielle

## 2021-07-27 NOTE — PROGRESS NOTES
Patient visible on unit, social with peers and staff. Pt has reactive affect. Pt pleasant and cooperative. Tangential with pressured speech. Patient states she didn't sleep long last night, but once she did fall asleep she slept good. Pt reports good appetite. Pt denies depression or anxiety. Pt denies SI, HI, and Hallucinations. Pt states her daughter, who lives in [place], is a stressor and she is glad they have distance. Pt states next time her daughter visits she won't be staying with her. Pt reports she is happy they are having a family session and she is anticipating discharge.  Electronically signed by Keyonna Cruz RN on 7/27/21 at 4:06 PM EDT

## 2021-07-27 NOTE — FLOWSHEET NOTE
Pt requested vistaril for anxiety rated a 7/10 with being the worst. Pt was offered the sensory room or puzzles or a book to read. Pt declined and was medicated with 50 mg vistaril po. Pt would not elaborate about the anxiety.

## 2021-07-27 NOTE — PROGRESS NOTES
Pt. attended the 0900 community meeting. Electronically signed by Jaya Michele [address] on 7/27/2021 at 9:37 AM

## 2021-07-27 NOTE — PROGRESS NOTES
Patient has been labile in mood and affect/ some rapid pressured and tangential thoughts remain. Attending some groups  Focused on discharge and pending family session. Upset that she could not leave today because of here not sleeping and plans to be still all night so she can look like she is sleeping and has also reported that her book  Has been to good to put down.   Visible and social with peers/ adl's and appetite appropriate

## 2021-07-27 NOTE — GROUP NOTE
Group Therapy Note    Date: 7/27/2021    Group Start Time: 1245  Group End Time: 1310  Group Topic: Cognitive Skills    [facility]    KARIN Bustillos        Group Therapy Note    Attendees: 3         Patient's Goal:  Identified importance of interpersonal relationship and how to utilize them in recovery     Notes:  Supportive to peers     Status After Intervention:  Unchanged    Participation Level:  Active Listener and Interactive    Participation Quality: Supportive      Speech:  pressured      Thought Process/Content: Logical      Affective Functioning: Exaggerated      Mood: elevated      Level of consciousness:  Alert      Response to Learning: Progressing to goal      Endings: None Reported    Modes of Intervention: Education      Discipline Responsible: /Counselor      Signature:  KARIN Bustillos

## 2021-07-27 NOTE — GROUP NOTE
Group Therapy Note    Date: 7/27/2021    Group Start Time: 1110  Group End Time: 1140  Group Topic: Psychotherapy    [facility]    Mateo Astudillo [address]        Group Therapy Note    Attendees: 3         Patient's Goal:  To have a family meeting with her     Notes:  Patient wants to be discharged    Status After Intervention:  Improved    Participation Level: Interactive    Participation Quality: Attentive      Speech:  normal      Thought Process/Content: Flight of ideas      Affective Functioning: Congruent      Mood: anxious      Level of consciousness:  Alert      Response to Learning: Progressing to goal      Endings: None Reported    Modes of Intervention: Support      Discipline Responsible: /Counselor      Signature:  Mateo Ibanez

## 2021-07-27 NOTE — GROUP NOTE
Group Therapy Note    Date: 7/27/2021    Group Start Time: 1400  Group End Time: 1430  Group Topic: Healthy Living/Wellness    [facility]    Deepali Cantu RN        Group Therapy Note    Attendees: 4       Patient's Goal:  Improve socialization    Notes:  Interacting appropriately with peers/ discussed social activities     Status After Intervention:  Improved    Participation Level:  Active Listener    Participation Quality: Appropriate      Speech:  normal      Thought Process/Content: Logical      Affective Functioning: Congruent      Mood: anxious      Level of consciousness:  Alert      Response to Learning: Able to verbalize current knowledge/experience      Endings: None Reported    Modes of Intervention: Socialization      Discipline Responsible: Registered Nurse      Signature:  Deepali Cantu RN

## 2021-07-28 PROCEDURE — 6370000000 HC RX 637 (ALT 250 FOR IP): Performed by: PHYSICIAN ASSISTANT

## 2021-07-28 PROCEDURE — 90833 PSYTX W PT W E/M 30 MIN: CPT | Performed by: PSYCHIATRY & NEUROLOGY

## 2021-07-28 PROCEDURE — 6370000000 HC RX 637 (ALT 250 FOR IP): Performed by: PSYCHIATRY & NEUROLOGY

## 2021-07-28 PROCEDURE — 6370000000 HC RX 637 (ALT 250 FOR IP): Performed by: NURSE PRACTITIONER

## 2021-07-28 PROCEDURE — 1240000000 HC EMOTIONAL WELLNESS R&B

## 2021-07-28 PROCEDURE — 99232 SBSQ HOSP IP/OBS MODERATE 35: CPT | Performed by: PSYCHIATRY & NEUROLOGY

## 2021-07-28 RX ADMIN — IBUPROFEN 400 MG: 400 TABLET, FILM COATED ORAL at 19:51

## 2021-07-28 RX ADMIN — IBUPROFEN 400 MG: 400 TABLET, FILM COATED ORAL at 13:30

## 2021-07-28 RX ADMIN — GABAPENTIN 400 MG: 400 CAPSULE ORAL at 08:57

## 2021-07-28 RX ADMIN — TRIAMTERENE AND HYDROCHLOROTHIAZIDE 1 TABLET: 37.5; 25 TABLET ORAL at 08:58

## 2021-07-28 RX ADMIN — ZOLPIDEM TARTRATE 5 MG: 5 TABLET ORAL at 20:10

## 2021-07-28 RX ADMIN — HYDROXYZINE PAMOATE 50 MG: 50 CAPSULE ORAL at 10:47

## 2021-07-28 RX ADMIN — IBUPROFEN 400 MG: 400 TABLET, FILM COATED ORAL at 06:44

## 2021-07-28 RX ADMIN — LURASIDONE HYDROCHLORIDE 40 MG: 40 TABLET, FILM COATED ORAL at 18:06

## 2021-07-28 RX ADMIN — GABAPENTIN 400 MG: 400 CAPSULE ORAL at 20:10

## 2021-07-28 RX ADMIN — Medication 2000 UNITS: at 08:57

## 2021-07-28 RX ADMIN — LEVOTHYROXINE SODIUM 50 MCG: 0.05 TABLET ORAL at 06:09

## 2021-07-28 RX ADMIN — ANASTROZOLE 1 MG: 1 TABLET, COATED ORAL at 09:16

## 2021-07-28 RX ADMIN — LORAZEPAM 1 MG: 1 TABLET ORAL at 20:10

## 2021-07-28 RX ADMIN — ACETAMINOPHEN 650 MG: 325 TABLET ORAL at 16:50

## 2021-07-28 ASSESSMENT — PAIN SCALES - GENERAL
PAINLEVEL_OUTOF10: 3
PAINLEVEL_OUTOF10: 4
PAINLEVEL_OUTOF10: 5
PAINLEVEL_OUTOF10: 4
PAINLEVEL_OUTOF10: 5

## 2021-07-28 NOTE — PROGRESS NOTES
40 mg, Oral, Dinner, Luis Angel Quesada MD, 40 mg at 07/27/21 1738    gabapentin (NEURONTIN) capsule 400 mg, 400 mg, Oral, TID, Luis Angel Quesada MD, 400 mg at 07/28/21 0857    ibuprofen (ADVIL;MOTRIN) tablet 400 mg, 400 mg, Oral, Q6H PRN, Primitivo Burton MD, 400 mg at 07/28/21 1330    vitamin D (CHOLECALCIFEROL) tablet 2,000 Units, 2,000 Units, Oral, Daily, IDALMIS Chilel NP, 2,000 Units at 07/28/21 0857    SUMAtriptan (IMITREX) tablet 100 mg, 100 mg, Oral, Daily PRN, IDALMIS Chilel NP    triamterene-hydroCHLOROthiazide (EEEYQCS-26) 37.5-25 MG per tablet 1 tablet, 1 tablet, Oral, Daily, IDALMIS Chilel NP, 1 tablet at 07/28/21 0858    levothyroxine (SYNTHROID) tablet 50 mcg, 50 mcg, Oral, Daily, IDALMIS Chilel NP, 50 mcg at 07/28/21 8601    anastrozole (ARIMIDEX) tablet 1 mg, 1 mg, Oral, Daily, Alecia Winter PA-C, 1 mg at 07/28/21 0916    haloperidol lactate (HALDOL) injection 5 mg, 5 mg, Intramuscular, Q6H PRN **OR** haloperidol (HALDOL) tablet 5 mg, 5 mg, Oral, Q6H PRN, Presley Juarez MD    hydrOXYzine (VISTARIL) capsule 50 mg, 50 mg, Oral, Q6H PRN, 50 mg at 07/28/21 1047 **OR** hydrOXYzine (VISTARIL) injection 50 mg, 50 mg, Intramuscular, Q6H PRN, Presley Juarez MD    acetaminophen (TYLENOL) tablet 650 mg, 650 mg, Oral, Q4H PRN, Presley Juarez MD, 650 mg at 07/27/21 1536    traZODone (DESYREL) tablet 50 mg, 50 mg, Oral, Nightly PRN, Presley Juarez MD, 50 mg at 07/22/21 2100    benztropine mesylate (COGENTIN) injection 2 mg, 2 mg, Intramuscular, BID PRN, Presley Jaurez MD    magnesium hydroxide (MILK OF MAGNESIA) 400 MG/5ML suspension 30 mL, 30 mL, Oral, Daily PRN, Presley Juarez MD    nicotine (NICODERM CQ) 21 MG/24HR 1 patch, 1 patch, Transdermal, Daily, Presley Juarez MD, 1 patch at 07/28/21 0859      Examination:  /78   Pulse 64   Temp 97.8 °F (36.6 °C) (Oral)   Resp 16   Ht 5' 7\" (1.702 m)   Wt 160 lb (72.6 kg)   SpO2 95%   BMI 25.06 kg/m²   Gait - steady  Medication side effects(SE): no    Mental Status Examination:    Level of consciousness:  within normal limits   Appearance:  poor grooming and poor hygiene  Behavior/Motor:  hyperactive  Attitude toward examiner:  playful  Speech:  hyperverbal   Mood: less euphoric  Affect:  mood congruent  Thought processes:less flight of ideas,   Thought content:  Suicidal Ideation:  denies suicidal ideation  Cognition:  oriented to person, place, and time   Concentration better  Insight better   Judgement better    ASSESSMENT:   Patient symptoms are:  [] Well controlled  [x] Improving  [] Worsening  [] No change      Diagnosis:   Active Problems:    Bipolar 1 disorder ([facility]  Resolved Problems:    * No resolved hospital problems. *      LABS:    No results for input(s): WBC, HGB, PLT in the last 72 hours. No results for input(s): NA, K, CL, CO2, BUN, CREATININE, GLUCOSE in the last 72 hours. No results for input(s): BILITOT, ALKPHOS, AST, ALT in the last 72 hours. Lab Results   Component Value Date    LABAMPH Neg 07/19/2021    BARBSCNU Neg 07/19/2021    LABBENZ Neg 07/19/2021    LABMETH Neg 07/19/2021    OPIATESCREENURINE Neg 07/19/2021    PHENCYCLIDINESCREENURINE Neg 07/19/2021    ETOH <10 07/19/2021     Lab Results   Component Value Date    TSH 2.060 07/19/2021     No results found for: LITHIUM  No results found for: VALPROATE, CBMZ      Treatment Plan:  Reviewed current Medications with the patient. Ambien added  Continue ativan taper  Family meeting today  Risks, benefits, side effects, drug-to-drug interactions and alternatives to treatment were discussed. Collateral information:  CD evaluation  Encourage patient to attend group and other milieu activities.   Discharge planning discussed with the patient and treatment team.    PSYCHOTHERAPY/COUNSELING:  [x] Therapeutic interview  [x] Supportive  [] CBT  [] Ongoing  [] Other  Patient was seen 1:1 for 20 minutes, other than E&M time spent, focusing on      - coping skills techniques     - Anxiety management techniques discussed including deep breathing exercise and PMR     - discussing patients strength and weakness      - Motivational interviewing to assess the stage of change and assessing patient readiness to quit substance use.      - Focusing on negative cognition and maladaptive thoughts, which is feeding and maintaining the depression symptoms         [x] Patient continues to need, on a daily basis, active treatment furnished directly by or requiring the supervision of inpatient psychiatric personnel      Anticipated Length of stay: on Thursday discharge if stable            Electronically signed by Betty Wynn MD on 7/28/2021 at 3:30 PM

## 2021-07-28 NOTE — PROGRESS NOTES
Pt. attended the 0900 community meeting. Electronically signed by Dennise Wright [address] [place] on 7/28/2021 at 12:02 PM

## 2021-07-28 NOTE — PROGRESS NOTES
Pt is given Vistaril for some anxiety after family meeting.  Electronically signed by Jacqueline Copeland LPN on 3/90/3451 at 31:65 AM

## 2021-07-28 NOTE — GROUP NOTE
Group Therapy Note    Date: 7/28/2021    Group Start Time: 1330  Group End Time: 1400  Group Topic: Group Therapy    [facility]    Sandip Thompson RN        Group Therapy Note    Attendees: 5/13         Patient's Goal:      Notes:      Status After Intervention:  Unchanged    Participation Level: Monopolizing    Participation Quality: Intrusive      Speech:  pressured      Thought Process/Content: Flight of ideas      Affective Functioning: Exaggerated      Mood: elevated      Level of consciousness:  Alert      Response to Learning: Progressing to goal      Endings: None Reported    Modes of Intervention: Education      Discipline Responsible: Registered Nurse      Signature:  Sandip Thompson RN

## 2021-07-28 NOTE — GROUP NOTE
Group Therapy Note    Date: 7/28/2021    Group Start Time: 1110  Group End Time: 1200  Group Topic: Psychotherapy    [facility], [facility]        Group Therapy Note    Attendees: 7         Patient's Goal:  To be discharged tomorrow    Notes:  Patient stated she wants to be able to drive due to working every day    Status After Intervention:  Improved    Participation Level: Interactive    Participation Quality: Appropriate      Speech:  normal      Thought Process/Content: Logical      Affective Functioning: Congruent      Mood: elevated      Level of consciousness:  Alert      Response to Learning: Progressing to goal      Endings: None Reported    Modes of Intervention: Support      Discipline Responsible: /Counselor      Signature:  Gladys Carrillo, [facility]

## 2021-07-28 NOTE — CARE COORDINATION
Writer met with pt to conduct family meeting. Writer called pt's  who had their son with him and phone was placed on speaker phone. The following was discussed:    1. Medication management. Pt's medications will be placed in a pill minder and meds will be locked away from pt until determined by outpatient provider she is stable enough to manage them herself. 2. MD states that pt is not to drive until her outpatient MD determines this is safe due to her recent reckless driving behaviors. Pt was very upset about this. 3. Discussed pt following up with the [facility] for wrap around care but pt declined this and states that she wants to meet with Dr. Khanh Ohara until she retires. Pt is heavily focused on insurance not covering other agencies. Discussed finding approved agencies/providers through her insurance, pt was adamant she will do this once Dr. Charlton Getting. 4. Pt is allowing her  to receive a copy of her AVS so he can coordinate her appointments and medications. 5. Discharge is scheduled for 7/29/21. Pt is not happy about this. Pt is heavily discharge focused. 6. Discussed mixing alcohol with psychotropic medications and how this is frowned upon.  called writer after the family meeting and stated that he got an appointment with Dr. Khanh Ohara tomorrow at 3:40pm if we need it.      Electronically signed by KARIN Mccollum on 7/28/2021 at 10:39 AM

## 2021-07-28 NOTE — GROUP NOTE
Group Therapy Note    Date: 7/28/2021    Group Start Time: 0839  Group End Time: 1063  Group Topic: Healthy Living/Wellness    [facility]    Scioto Fearing        Group Therapy Note    Attendees: 7/15         Patient's Goal:  To learn the benefits of meditation and to participate in a guided meditation. Notes:  Patient participated in group discussion and guided meditation.     Status After Intervention:  Unchanged    Participation Level: Interactive    Participation Quality: Appropriate and Attentive      Speech:  normal      Thought Process/Content: Logical      Affective Functioning: Flat      Mood: euthymic      Level of consciousness:  Alert and Attentive      Response to Learning: Progressing to goal      Endings: None Reported    Modes of Intervention: Education      Discipline Responsible: [address], [employer]      Signature:  Agapito Santoyo

## 2021-07-28 NOTE — CARE COORDINATION
Pt cooperative with assessment. Denies all. Rapid pressured speech noted with FOI, but has improved. Pt reports great sleep with the Ambien. \"I'm eating like a pig. \"  Pt is preoccupied with going home today and became tearful pondering the thought of having to wait until tomorrow. Pt regained control. Pt is very talkative and continues to deny the report about why she ran out of gas and walked until she had sores on her feet.

## 2021-07-28 NOTE — PROGRESS NOTES
Pt. refused to attend the 1000 skills group, despite staff encouragement. Electronically signed by Marco Curiel, [address] on 7/28/2021 at 12:02 PM

## 2021-07-28 NOTE — PROGRESS NOTES
Patient accepted PRN tylenol for complaint of 4/10 jaw pain.  Electronically signed by Evelin Pérez RN on 7/28/21 at 4:52 PM EDT

## 2021-07-28 NOTE — PROGRESS NOTES
Pt accepted PRN motrin for complaint of jaw pain 5/10.  Electronically signed by Laila Guerrero RN on 7/28/21 at 7:52 PM EDT

## 2021-07-28 NOTE — PROGRESS NOTES
Patient did not attend group despite staff encouragement.   Electronically signed by Yolanda Colvin on 7/28/2021 at 7:34 PM

## 2021-07-29 VITALS
TEMPERATURE: 97.7 F | DIASTOLIC BLOOD PRESSURE: 90 MMHG | OXYGEN SATURATION: 96 % | HEART RATE: 91 BPM | WEIGHT: 160 LBS | BODY MASS INDEX: 25.11 KG/M2 | RESPIRATION RATE: 16 BRPM | SYSTOLIC BLOOD PRESSURE: 128 MMHG | HEIGHT: 67 IN

## 2021-07-29 PROCEDURE — 6370000000 HC RX 637 (ALT 250 FOR IP): Performed by: NURSE PRACTITIONER

## 2021-07-29 PROCEDURE — 6370000000 HC RX 637 (ALT 250 FOR IP): Performed by: PSYCHIATRY & NEUROLOGY

## 2021-07-29 PROCEDURE — 99239 HOSP IP/OBS DSCHRG MGMT >30: CPT | Performed by: PSYCHIATRY & NEUROLOGY

## 2021-07-29 PROCEDURE — 6370000000 HC RX 637 (ALT 250 FOR IP): Performed by: PHYSICIAN ASSISTANT

## 2021-07-29 RX ORDER — LORAZEPAM 1 MG/1
1 TABLET ORAL NIGHTLY
Refills: 0 | Status: ON HOLD | COMMUNITY
Start: 2021-07-29 | End: 2021-09-28 | Stop reason: HOSPADM

## 2021-07-29 RX ORDER — ZOLPIDEM TARTRATE 5 MG/1
5 TABLET ORAL NIGHTLY
Qty: 14 TABLET | Refills: 2 | Status: SHIPPED | OUTPATIENT
Start: 2021-07-29 | End: 2021-08-12

## 2021-07-29 RX ADMIN — LEVOTHYROXINE SODIUM 50 MCG: 0.05 TABLET ORAL at 06:29

## 2021-07-29 RX ADMIN — GABAPENTIN 400 MG: 400 CAPSULE ORAL at 08:44

## 2021-07-29 RX ADMIN — ACETAMINOPHEN 650 MG: 325 TABLET ORAL at 08:44

## 2021-07-29 RX ADMIN — ANASTROZOLE 1 MG: 1 TABLET, COATED ORAL at 08:43

## 2021-07-29 RX ADMIN — TRIAMTERENE AND HYDROCHLOROTHIAZIDE 1 TABLET: 37.5; 25 TABLET ORAL at 08:44

## 2021-07-29 RX ADMIN — Medication 2000 UNITS: at 08:44

## 2021-07-29 RX ADMIN — IBUPROFEN 400 MG: 400 TABLET, FILM COATED ORAL at 03:08

## 2021-07-29 ASSESSMENT — PAIN SCALES - GENERAL
PAINLEVEL_OUTOF10: 4
PAINLEVEL_OUTOF10: 5
PAINLEVEL_OUTOF10: 0
PAINLEVEL_OUTOF10: 3

## 2021-07-29 NOTE — PROGRESS NOTES
Pt. refused to attend the 1000 skills group, despite staff encouragement. Electronically signed by Johnny Crump [address] on 7/29/2021 at 11:02 AM

## 2021-07-29 NOTE — GROUP NOTE
Group Therapy Note    Date: 7/28/2021    Group Start Time: 2100  Group End Time: 2110  Group Topic: Wrap-Up    [facility]    Con [place]        Group Therapy Note    Attendees: 3/16         Patient's Goal:  \"to smile regardless of me not getting to leave\"    Notes:  Patient reported meeting their goal for today. Patient shared she got to talk to some very nice people, including this tech. Patient chose to participate in a deep breathing exercise following wrap up group.     Status After Intervention:  Unchanged    Participation Level: Interactive    Participation Quality: Appropriate, Attentive and Sharing      Speech:  normal      Thought Process/Content: Logical      Affective Functioning: Congruent      Mood: euthymic      Level of consciousness:  Alert and Attentive      Response to Learning: Progressing to goal      Endings: None Reported    Modes of Intervention: Support      Discipline Responsible: [employer]      Signature:  Jaquan Estes

## 2021-07-29 NOTE — DISCHARGE SUMMARY
DISCHARGE SUMMARY      Patient ID:  Fransisca Pineda  [id number]  11 y.o.  1960      Admit date: 7/19/2021    Discharge date and time: 7/29/2021    Admitting Physician: Garrett Salazar MD     Discharge Physician: Dr Betzy Ba MD    Admission Diagnoses: Bipolar 1 disorder [facility]) [F31.9]    Admission Condition: poor    Discharged Condition: stable    Admission Circumstance: Fransisca Pineda  is a 59 y.o. female with history of treatment for mood disorder who was admitted from the emergency department due to not sleeping, anxiety, racing thoughts. Patient acknowledges current symptoms of racing thoughts, anxiety. Current stressors include not sleeping.     Sara Butler is a 59 y. o. female who per chart reviews past medical history of migraines anxiety depression breast cancer hypothyroidism presents to the emergency department for a psychiatric evaluation. Domitila Ledesma is pink slipped by the [facility] at this time.  Per pink slip, patient appears to be in a manic episode as evidenced by restlessness irritability agitation decreased need for sleep and impulsivity.  Patient  said that she goes multiple days without sleeping and plays music very loud at all hours of the night.  She has not been compliant with her psychotic medications from a recent hospitalization.  Patient states she is fine and does not know why she is in the hospital, does not feel that evaluation is necessary. She states she got into an argument with her daughter who then called the police. She denies SI, HI, AVH, drug and alcohol use. No medical complaints at this time.     Patient is circumstatial, hyperverbose, mildly pressured speech. Perseverative on family members who she feels have been unjustly causing her \"problems\", including her , her daughter. Says her daughter is Bipolar Disorder. Says she sees Dr. Asaf Vargas. Patient reports that she had been on a psychiatric hold recently at another hospital ([facility] [address]  \"The only thing I am on options. She declines Seroquel or other mood stabilizers. Strongly encouraged her to stop drinking ETOH. Will stay with current dose of Ativan and continue to slowly wean.             jana  called and said that she was brought home by [employer] and that she drove till she ran out of gas 80 miles away. His number is [phone] and she gives permission To speak to her . Spoke with Loida Mak who states Sharon Martinez is at [facility] on a pink slip and is not being cooperative. He adds history that she has not been taking Ativan as directed even druing the weaning process and has lied about several things related to stressors. He is concerned he has had not communication from the hospital nurses and doctors since her admission.            PAST MEDICAL/PSYCHIATRIC HISTORY:   Past Medical History:   Diagnosis Date    Anxiety     Cancer ([facility] [address] 05/10/2017    DCIS left breast    Depression     Manic depression (HCC)     Migraines     Neuropathy     Thyroid disease        FAMILY/SOCIAL HISTORY:  Family History   Problem Relation Age of Onset    High Blood Pressure Mother     High Cholesterol Mother     Cancer Father         brain cancer    Stroke Father     Other Father         ulcerative colitis    High Blood Pressure Father     Cancer Sister         uterine cancer    High Blood Pressure Sister     High Blood Pressure Brother      Social History     Socioeconomic History    Marital status:      Spouse name: Not on file    Number of children: Not on file    Years of education: Not on file    Highest education level: Not on file   Occupational History    Not on file   Tobacco Use    Smoking status: Current Every Day Smoker     Packs/day: 1.00     Years: 13.00     Pack years: 13.00     Types: Cigarettes    Smokeless tobacco: Never Used    Tobacco comment: start age 10   Vaping Use    Vaping Use: Never used   Substance and Sexual Activity    Alcohol use: Yes     Comment: occasional     Drug use: No    Sexual activity: Yes   Other Topics Concern    Not on file   Social History Narrative    Not on file     Social Determinants of Health     Financial Resource Strain:     Difficulty of Paying Living Expenses:    Food Insecurity:     Worried About Running Out of Food in the Last Year:     [address] in the Last Year:    Transportation Needs:     Lack of Transportation (Medical):      Lack of Transportation (Non-Medical):    Physical Activity:     Days of Exercise per Week:     Minutes of Exercise per Session:    Stress:     Feeling of Stress :    Social Connections:     Frequency of Communication with Friends and Family:     Frequency of Social Gatherings with Friends and Family:     Attends [employer] Services:     Active Member of Clubs or Organizations:     Attends Club or Organization Meetings:     Marital Status:    Intimate Partner Violence:     Fear of Current or Ex-Partner:     Emotionally Abused:     Physically Abused:     Sexually Abused:        MEDICATIONS:    Current Facility-Administered Medications:     zolpidem (AMBIEN) tablet 5 mg, 5 mg, Oral, Nightly, Brian Sanchez MD, 5 mg at 07/28/21 2010    LORazepam (ATIVAN) tablet 1 mg, 1 mg, Oral, Nightly, Brian Sanchez MD, 1 mg at 07/28/21 2010    lurasidone (LATUDA) tablet 40 mg, 40 mg, Oral, Dinner, Brian Sanchez MD, 40 mg at 07/28/21 1806    gabapentin (NEURONTIN) capsule 400 mg, 400 mg, Oral, TID, Brian Sanchez MD, 400 mg at 07/29/21 0844    ibuprofen (ADVIL;MOTRIN) tablet 400 mg, 400 mg, Oral, Q6H PRN, Yenni Gaitan MD, 400 mg at 07/29/21 0308    vitamin D (CHOLECALCIFEROL) tablet 2,000 Units, 2,000 Units, Oral, Daily, Malcom Bernheim, APRN - NP, 2,000 Units at 07/29/21 0844    SUMAtriptan (IMITREX) tablet 100 mg, 100 mg, Oral, Daily PRN, Malcom Bernheim, APRN - NP    triamterene-hydroCHLOROthiazide (MAXZIDE-25) 37.5-25 MG per tablet 1 tablet, 1 tablet, Oral, Daily, Malcom Bernheim, APRN - NP, 1 tablet at 07/29/21 0844    levothyroxine (SYNTHROID) tablet 50 mcg, 50 mcg, Oral, Daily, Alverto Wilkins, APRN - NP, 50 mcg at 07/29/21 9391    anastrozole (ARIMIDEX) tablet 1 mg, 1 mg, Oral, Daily, Megan Winter PA-C, 1 mg at 07/29/21 0843    haloperidol lactate (HALDOL) injection 5 mg, 5 mg, Intramuscular, Q6H PRN **OR** haloperidol (HALDOL) tablet 5 mg, 5 mg, Oral, Q6H PRN, Patrizia Chang MD    hydrOXYzine (VISTARIL) capsule 50 mg, 50 mg, Oral, Q6H PRN, 50 mg at 07/28/21 1047 **OR** hydrOXYzine (VISTARIL) injection 50 mg, 50 mg, Intramuscular, Q6H PRN, Patrizia Chang MD    acetaminophen (TYLENOL) tablet 650 mg, 650 mg, Oral, Q4H PRN, Patrizia Chang MD, 650 mg at 07/29/21 0844    traZODone (DESYREL) tablet 50 mg, 50 mg, Oral, Nightly PRN, Patrizia Chang MD, 50 mg at 07/22/21 2100    benztropine mesylate (COGENTIN) injection 2 mg, 2 mg, Intramuscular, BID PRN, Patrizia Chang MD    magnesium hydroxide (MILK OF MAGNESIA) 400 MG/5ML suspension 30 mL, 30 mL, Oral, Daily PRN, Patrizia Chang MD    nicotine (NICODERM CQ) 21 MG/24HR 1 patch, 1 patch, Transdermal, Daily, Patrizia Chang MD, 1 patch at 07/29/21 8660    Examination:  BP (!) 128/90 Comment: RN Notified   Pulse 91   Temp 97.7 °F (36.5 °C)   Resp 16   Ht 5' 7\" (1.702 m)   Wt 160 lb (72.6 kg)   SpO2 96%   BMI 25.06 kg/m²   Gait - steady    HOSPITAL COURSE[de-identified]  Following admission to the hospital, patient had a complete physical exam and blood work up  Patient was monitored closely with suicide precaution  Patient was started on medication as listed below    Was encouraged to participate in group and other milieu activity  Patient started to feel better with this combination of treatment. Significant progress in the symptoms since admission.     Mood better, with the score of 2/10 - bad   Still has mild flight of ideas  Sleeping better with Ambien  Ativan was tapered to 1mg po qhs    No AVH or paranoid thoughts  No Hopeless or worthless feeling  No active SI/HI  Appetite:  [x] Normal  [] Increased  [] Decreased    Sleep:       [x] Normal  [] Fair       [] Poor            Energy:    [x] Normal  [] Increased  [] Decreased     SI [] Present  [x] Absent  HI  []Present  [x] Absent   Aggression:  [] yes  [] no  Patient is [x] able  [] unable to CONTRACT FOR SAFETY   Medication side effects(SE):  [x] None(Psych. Meds.) [] Other      Mental Status Examination on discharge:    Level of consciousness:  within normal limits   Appearance:  well-appearing  Behavior/Motor:  no abnormalities noted  Attitude toward examiner:  attentive and good eye contact  Speech:  spontaneous, normal rate and normal volume   Mood: anxious  Affect:  mood congruent  Thought processes:  coherent   Thought content:  Suicidal Ideation:  denies suicidal ideation  Cognition:  oriented to person, place, and time   Concentration intact  Memory intact  Insight good   Judgement fair   Fund of Knowledge adequate      ASSESSMENT:  Patient symptoms are:  [x] Well controlled  [x] Improving  [] Worsening  [] No change      Diagnosis:  Active Problems:    Bipolar 1 disorder ([address]  Resolved Problems:    * No resolved hospital problems. *      LABS:    No results for input(s): WBC, HGB, PLT in the last 72 hours. No results for input(s): NA, K, CL, CO2, BUN, CREATININE, GLUCOSE in the last 72 hours. No results for input(s): BILITOT, ALKPHOS, AST, ALT in the last 72 hours. Lab Results   Component Value Date    LABAMPH Neg 07/19/2021    BARBSCNU Neg 07/19/2021    LABBENZ Neg 07/19/2021    LABMETH Neg 07/19/2021    OPIATESCREENURINE Neg 07/19/2021    PHENCYCLIDINESCREENURINE Neg 07/19/2021    ETOH <10 07/19/2021     Lab Results   Component Value Date    TSH 2.060 07/19/2021     No results found for: LITHIUM  No results found for: VALPROATE, CBMZ    RISK ASSESSMENT AT DISCHARGE: Low risk for suicide and homicide.      Treatment Plan:  Reviewed current Medications with the patient. Education provided on the complaince with treatment. Recommend not to drive until she sees her OP psychiatrist for clearance    Risks, benefits, side effects, drug-to-drug interactions and alternatives to treatment were discussed. Encourage patient to attend outpatient follow up appointment and therapy. Patient was advised to call the outpatient provider, visit the nearest ED or call 911 if symptoms are not manageable. Patient's family member was contacted prior to the discharge. Medication List      START taking these medications    lurasidone 40 MG Tabs tablet  Commonly known as: LATUDA  Take 1 tablet by mouth Daily with supper     zolpidem 5 MG tablet  Commonly known as: AMBIEN  Take 1 tablet by mouth nightly for 14 days. CHANGE how you take these medications    LORazepam 1 MG tablet  Commonly known as: ATIVAN  What changed:   · medication strength  · See the new instructions.         CONTINUE taking these medications    anastrozole 1 MG tablet  Commonly known as: ARIMIDEX  TAKE 1 TABLET EVERY DAY     gabapentin 300 MG capsule  Commonly known as: NEURONTIN     levothyroxine 25 MCG tablet  Commonly known as: SYNTHROID  Take 1 tablet by mouth Daily     SUMAtriptan 100 MG tablet  Commonly known as: IMITREX  TAKE 1 TABLET BY MOUTH once AS NEEDED for FOR MIGRAINE     triamterene-hydroCHLOROthiazide 37.5-25 MG per tablet  Commonly known as: MAXZIDE-25     TUMS PO     vitamin D 50 MCG (2000 UT) Tabs tablet  Commonly known as: CHOLECALCIFEROL  Take 1 tablet by mouth daily        STOP taking these medications    amoxicillin 500 MG capsule  Commonly known as: AMOXIL     Cymbalta 30 MG extended release capsule  Generic drug: DULoxetine           Where to Get Your Medications      These medications were sent to [address] [place] [address] [address] [phone] - F [phone]  [address] [place] [address]    Phone: [phone] · lurasidone 40 MG Tabs tablet     You can get these medications from any pharmacy    Bring a paper prescription for each of these medications  · zolpidem 5 MG tablet           Reason for more than one antipsychotic:   [x] N/A  [] 3 failed monotherapy(drugs tried):  [] Cross over to a new antipsychotic  [] Taper to monotherapy from polypharmacy  [] Augmentation of Clozapine therapy due to treatment resistance to single therapy        TIME SPEND - 35 MINUTES TO COMPLETE THE EVALUATION, DISCHARGE SUMMARY, MEDICATION RECONCILIATION AND FOLLOW UP CARE     Signed:  Jacob Jin MD  7/29/2021  9:58 AM

## 2021-07-29 NOTE — PROGRESS NOTES
Pt. attended the 0900 community meeting. Electronically signed by Derek Lindsay, [address] on 7/29/2021 at 9:45 AM

## 2021-07-29 NOTE — PROGRESS NOTES
Patient voiced sinus pain, rate 4/10 with 10 being the worst, medicated with Motrin per request. Will continue to monitor.

## 2021-09-17 ENCOUNTER — HOSPITAL ENCOUNTER (EMERGENCY)
Age: 61
Discharge: ANOTHER HOSPITAL | End: 2021-09-17
Attending: EMERGENCY MEDICINE
Payer: COMMERCIAL

## 2021-09-17 ENCOUNTER — HOSPITAL ENCOUNTER (INPATIENT)
Age: 61
LOS: 9 days | Discharge: OTHER INSTITUTION | DRG: 885 | End: 2021-09-28
Attending: EMERGENCY MEDICINE | Admitting: PSYCHIATRY & NEUROLOGY
Payer: COMMERCIAL

## 2021-09-17 VITALS
RESPIRATION RATE: 20 BRPM | WEIGHT: 155 LBS | OXYGEN SATURATION: 98 % | HEART RATE: 71 BPM | TEMPERATURE: 97.7 F | HEIGHT: 68 IN | BODY MASS INDEX: 23.49 KG/M2 | SYSTOLIC BLOOD PRESSURE: 136 MMHG | DIASTOLIC BLOOD PRESSURE: 72 MMHG

## 2021-09-17 DIAGNOSIS — F31.9 BIPOLAR 1 DISORDER (HCC): Primary | ICD-10-CM

## 2021-09-17 DIAGNOSIS — Z91.83 WANDERING ASSOCIATED WITH MENTAL DISORDER: ICD-10-CM

## 2021-09-17 DIAGNOSIS — F41.1 GENERALIZED ANXIETY DISORDER: ICD-10-CM

## 2021-09-17 LAB
ACETAMINOPHEN LEVEL: <15 UG/ML (ref 10–30)
ALBUMIN SERPL-MCNC: 4.8 G/DL (ref 3.5–4.6)
ALP BLD-CCNC: 57 U/L (ref 40–130)
ALT SERPL-CCNC: 16 U/L (ref 0–33)
AMPHETAMINE SCREEN, URINE: ABNORMAL
ANION GAP SERPL CALCULATED.3IONS-SCNC: 15 MEQ/L (ref 9–15)
AST SERPL-CCNC: 27 U/L (ref 0–35)
BACTERIA: ABNORMAL /HPF
BARBITURATE SCREEN URINE: ABNORMAL
BASOPHILS ABSOLUTE: 0 K/UL (ref 0–0.1)
BASOPHILS RELATIVE PERCENT: 0.5 % (ref 0.1–1.2)
BENZODIAZEPINE SCREEN, URINE: POSITIVE
BILIRUB SERPL-MCNC: 0.4 MG/DL (ref 0.2–0.7)
BILIRUBIN URINE: NEGATIVE
BLOOD, URINE: ABNORMAL
BUN BLDV-MCNC: 18 MG/DL (ref 8–23)
CALCIUM SERPL-MCNC: 9.7 MG/DL (ref 8.5–9.9)
CANNABINOID SCREEN URINE: ABNORMAL
CHLORIDE BLD-SCNC: 105 MEQ/L (ref 95–107)
CLARITY: CLEAR
CO2: 22 MEQ/L (ref 20–31)
COCAINE METABOLITE SCREEN URINE: ABNORMAL
COLOR: YELLOW
CREAT SERPL-MCNC: 0.63 MG/DL (ref 0.5–0.9)
EKG ATRIAL RATE: 68 BPM
EKG P AXIS: 71 DEGREES
EKG P-R INTERVAL: 164 MS
EKG Q-T INTERVAL: 400 MS
EKG QRS DURATION: 70 MS
EKG QTC CALCULATION (BAZETT): 425 MS
EKG R AXIS: 3 DEGREES
EKG T AXIS: 43 DEGREES
EKG VENTRICULAR RATE: 68 BPM
EOSINOPHILS ABSOLUTE: 0.2 K/UL (ref 0–0.4)
EOSINOPHILS RELATIVE PERCENT: 1.7 % (ref 0.7–5.8)
EPITHELIAL CELLS, UA: ABNORMAL /HPF
ETHANOL PERCENT: NORMAL G/DL
ETHANOL: <10 MG/DL (ref 0–0.08)
GFR AFRICAN AMERICAN: >60
GFR NON-AFRICAN AMERICAN: >60
GLOBULIN: 2.3 G/DL (ref 2.3–3.5)
GLUCOSE BLD-MCNC: 97 MG/DL (ref 70–99)
GLUCOSE URINE: NEGATIVE MG/DL
HCT VFR BLD CALC: 40.9 % (ref 37–47)
HEMOGLOBIN: 13.4 G/DL (ref 11.2–15.7)
IMMATURE GRANULOCYTES #: 0 K/UL
IMMATURE GRANULOCYTES %: 0.2 %
KETONES, URINE: 40 MG/DL
LEUKOCYTE ESTERASE, URINE: ABNORMAL
LYMPHOCYTES ABSOLUTE: 1.4 K/UL (ref 1.2–3.7)
LYMPHOCYTES RELATIVE PERCENT: 16.1 %
Lab: ABNORMAL
MCH RBC QN AUTO: 31.3 PG (ref 25.6–32.2)
MCHC RBC AUTO-ENTMCNC: 32.8 % (ref 32.2–35.5)
MCV RBC AUTO: 95.6 FL (ref 79.4–94.8)
MONOCYTES ABSOLUTE: 0.6 K/UL (ref 0.2–0.9)
MONOCYTES RELATIVE PERCENT: 6.3 % (ref 4.7–12.5)
NEUTROPHILS ABSOLUTE: 6.5 K/UL (ref 1.6–6.1)
NEUTROPHILS RELATIVE PERCENT: 75.2 % (ref 34–71.1)
NITRITE, URINE: NEGATIVE
OPIATE SCREEN URINE: ABNORMAL
PDW BLD-RTO: 12.9 % (ref 11.7–14.4)
PH UA: 5.5 (ref 5–9)
PHENCYCLIDINE SCREEN URINE: ABNORMAL
PLATELET # BLD: 251 K/UL (ref 182–369)
POTASSIUM SERPL-SCNC: 4.2 MEQ/L (ref 3.4–4.9)
PROTEIN UA: NEGATIVE MG/DL
RBC # BLD: 4.28 M/UL (ref 3.93–5.22)
RBC UA: ABNORMAL /HPF (ref 0–2)
SALICYLATE, SERUM: <0.3 MG/DL (ref 15–30)
SARS-COV-2, NAAT: NOT DETECTED
SODIUM BLD-SCNC: 142 MEQ/L (ref 135–144)
SPECIFIC GRAVITY UA: 1.02 (ref 1–1.03)
TOTAL PROTEIN: 7.1 G/DL (ref 6.3–8)
TRICYCLIC, URINE: ABNORMAL
URINE REFLEX TO CULTURE: ABNORMAL
UROBILINOGEN, URINE: 0.2 E.U./DL
WBC # BLD: 8.7 K/UL (ref 4–10)
WBC UA: ABNORMAL /HPF (ref 0–5)

## 2021-09-17 PROCEDURE — 93010 ELECTROCARDIOGRAM REPORT: CPT | Performed by: INTERNAL MEDICINE

## 2021-09-17 PROCEDURE — 81001 URINALYSIS AUTO W/SCOPE: CPT

## 2021-09-17 PROCEDURE — 99285 EMERGENCY DEPT VISIT HI MDM: CPT

## 2021-09-17 PROCEDURE — 85025 COMPLETE CBC W/AUTO DIFF WBC: CPT

## 2021-09-17 PROCEDURE — G0378 HOSPITAL OBSERVATION PER HR: HCPCS

## 2021-09-17 PROCEDURE — 99223 1ST HOSP IP/OBS HIGH 75: CPT | Performed by: PSYCHIATRY & NEUROLOGY

## 2021-09-17 PROCEDURE — 87635 SARS-COV-2 COVID-19 AMP PRB: CPT

## 2021-09-17 PROCEDURE — 82077 ASSAY SPEC XCP UR&BREATH IA: CPT

## 2021-09-17 PROCEDURE — 99283 EMERGENCY DEPT VISIT LOW MDM: CPT

## 2021-09-17 PROCEDURE — 36415 COLL VENOUS BLD VENIPUNCTURE: CPT

## 2021-09-17 PROCEDURE — 80053 COMPREHEN METABOLIC PANEL: CPT

## 2021-09-17 PROCEDURE — 80306 DRUG TEST PRSMV INSTRMNT: CPT

## 2021-09-17 PROCEDURE — 6370000000 HC RX 637 (ALT 250 FOR IP): Performed by: EMERGENCY MEDICINE

## 2021-09-17 PROCEDURE — 93005 ELECTROCARDIOGRAM TRACING: CPT

## 2021-09-17 PROCEDURE — 6370000000 HC RX 637 (ALT 250 FOR IP): Performed by: PSYCHIATRY & NEUROLOGY

## 2021-09-17 PROCEDURE — 80179 DRUG ASSAY SALICYLATE: CPT

## 2021-09-17 PROCEDURE — 80143 DRUG ASSAY ACETAMINOPHEN: CPT

## 2021-09-17 RX ORDER — HYDROXYZINE PAMOATE 50 MG/1
50 CAPSULE ORAL EVERY 6 HOURS PRN
Status: DISCONTINUED | OUTPATIENT
Start: 2021-09-17 | End: 2021-09-28 | Stop reason: HOSPADM

## 2021-09-17 RX ORDER — NICOTINE 21 MG/24HR
1 PATCH, TRANSDERMAL 24 HOURS TRANSDERMAL DAILY
Status: DISCONTINUED | OUTPATIENT
Start: 2021-09-17 | End: 2021-09-17 | Stop reason: SDUPTHER

## 2021-09-17 RX ORDER — TRAZODONE HYDROCHLORIDE 50 MG/1
50 TABLET ORAL NIGHTLY PRN
Status: DISCONTINUED | OUTPATIENT
Start: 2021-09-17 | End: 2021-09-28 | Stop reason: HOSPADM

## 2021-09-17 RX ORDER — LORAZEPAM 0.5 MG/1
0.5 TABLET ORAL 2 TIMES DAILY
Status: DISCONTINUED | OUTPATIENT
Start: 2021-09-17 | End: 2021-09-19

## 2021-09-17 RX ORDER — BENZTROPINE MESYLATE 1 MG/ML
2 INJECTION INTRAMUSCULAR; INTRAVENOUS 2 TIMES DAILY PRN
Status: DISCONTINUED | OUTPATIENT
Start: 2021-09-17 | End: 2021-09-28 | Stop reason: HOSPADM

## 2021-09-17 RX ORDER — HALOPERIDOL 5 MG
5 TABLET ORAL EVERY 6 HOURS PRN
Status: DISCONTINUED | OUTPATIENT
Start: 2021-09-17 | End: 2021-09-28 | Stop reason: HOSPADM

## 2021-09-17 RX ORDER — HYDROXYZINE HYDROCHLORIDE 50 MG/ML
50 INJECTION, SOLUTION INTRAMUSCULAR EVERY 6 HOURS PRN
Status: DISCONTINUED | OUTPATIENT
Start: 2021-09-17 | End: 2021-09-28 | Stop reason: HOSPADM

## 2021-09-17 RX ORDER — VALPROIC ACID 250 MG/5ML
250 SOLUTION ORAL 2 TIMES DAILY
Status: DISCONTINUED | OUTPATIENT
Start: 2021-09-17 | End: 2021-09-24

## 2021-09-17 RX ORDER — IBUPROFEN 600 MG/1
600 TABLET ORAL ONCE
Status: COMPLETED | OUTPATIENT
Start: 2021-09-17 | End: 2021-09-17

## 2021-09-17 RX ORDER — ACETAMINOPHEN 160 MG/5ML
650 SOLUTION ORAL ONCE
Status: DISCONTINUED | OUTPATIENT
Start: 2021-09-17 | End: 2021-09-17

## 2021-09-17 RX ORDER — HALOPERIDOL 5 MG/ML
5 INJECTION INTRAMUSCULAR EVERY 6 HOURS PRN
Status: DISCONTINUED | OUTPATIENT
Start: 2021-09-17 | End: 2021-09-28 | Stop reason: HOSPADM

## 2021-09-17 RX ORDER — ACETAMINOPHEN 500 MG
1000 TABLET ORAL ONCE
Status: DISCONTINUED | OUTPATIENT
Start: 2021-09-17 | End: 2021-09-18

## 2021-09-17 RX ORDER — ACETAMINOPHEN 325 MG/1
650 TABLET ORAL EVERY 4 HOURS PRN
Status: DISCONTINUED | OUTPATIENT
Start: 2021-09-17 | End: 2021-09-28 | Stop reason: HOSPADM

## 2021-09-17 RX ADMIN — ACETAMINOPHEN 650 MG: 325 TABLET ORAL at 20:34

## 2021-09-17 RX ADMIN — VALPROIC ACID 250 MG: 250 SOLUTION ORAL at 20:35

## 2021-09-17 RX ADMIN — LORAZEPAM 0.5 MG: 0.5 TABLET ORAL at 20:34

## 2021-09-17 RX ADMIN — HYDROXYZINE PAMOATE 50 MG: 50 CAPSULE ORAL at 12:33

## 2021-09-17 RX ADMIN — LORAZEPAM 0.5 MG: 0.5 TABLET ORAL at 13:52

## 2021-09-17 RX ADMIN — NICOTINE POLACRILEX 2 MG: 2 GUM, CHEWING BUCCAL at 20:35

## 2021-09-17 RX ADMIN — ACETAMINOPHEN 650 MG: 325 TABLET ORAL at 12:33

## 2021-09-17 RX ADMIN — VALPROIC ACID 250 MG: 250 SOLUTION ORAL at 13:52

## 2021-09-17 RX ADMIN — IBUPROFEN 600 MG: 600 TABLET, FILM COATED ORAL at 08:30

## 2021-09-17 ASSESSMENT — LIFESTYLE VARIABLES
HISTORY_ALCOHOL_USE: NO
HISTORY_ALCOHOL_USE: NO

## 2021-09-17 ASSESSMENT — ENCOUNTER SYMPTOMS
EYE REDNESS: 0
EYE DISCHARGE: 0
CONSTIPATION: 0
BLOOD IN STOOL: 0
TROUBLE SWALLOWING: 0
COUGH: 0
ABDOMINAL PAIN: 0
STRIDOR: 0
SORE THROAT: 0
SINUS PRESSURE: 0
VOICE CHANGE: 0
BACK PAIN: 0
DIARRHEA: 0
EYE PAIN: 0
CHOKING: 0
SHORTNESS OF BREATH: 0
CHEST TIGHTNESS: 0
FACIAL SWELLING: 0
WHEEZING: 0
VOMITING: 0

## 2021-09-17 ASSESSMENT — PAIN SCALES - GENERAL
PAINLEVEL_OUTOF10: 0
PAINLEVEL_OUTOF10: 7
PAINLEVEL_OUTOF10: 1
PAINLEVEL_OUTOF10: 1

## 2021-09-17 ASSESSMENT — SLEEP AND FATIGUE QUESTIONNAIRES
SLEEP PATTERN: NORMAL
DO YOU USE A SLEEP AID: NO
AVERAGE NUMBER OF SLEEP HOURS: 7
DO YOU HAVE DIFFICULTY SLEEPING: NO

## 2021-09-17 NOTE — ED NOTES
Report called to [address] [place] [address] and EMS updated     Mustapha Crump RN  09/17/21 [id number]

## 2021-09-17 NOTE — CARE COORDINATION
Brief Intervention and Referral to Treatment Summary    Patient was provided PHQ-9, AUDIT and DAST Screening:      PHQ-9 Score: 0  AUDIT Score:  0  DAST Score:  0    Patients substance use is considered     Low Risk/[facility]  Moderate Risk  Harmful  Dependent    Patients depression is considered:     Minimalx  Mild   Moderate  Moderately Severe  Severe    Brief Education Was Provided    Patient was receptivex  Patient was not receptive      Brief Intervention Is Provided (Only for AUDIT or DAST)     Patient reports readiness to decrease and/or stop use and a plan was discussed   Patient denies readiness to decrease and/or stop use and a plan was not discussed      Recommendations/Referrals for Brief and/or Specialized Treatment Provided to Patient   Patient denies use of drugs or alcohol. Tox was negative. Patient will follow up with outside provider.

## 2021-09-17 NOTE — H&P
PSYCHIATRIC EVALUATION      CHIEF COMPLAINT:  Irene     HISTORY OF PRESENT ILLNESS: Kerrie Helms  is a 59 y.o. female with history of treatment for mood disorder who was admitted from the emergency department due to not sleeping, anxiety, racing thoughts. 59year old female presented from [facility] after being found by [facility] dave. Per notes, she was reported missing last night by her . She presents manic and fixated on getting a flu shot. She denies any issues, and was stating \"Imnot suicidal or homicidal\" as she was coming down the castaneda with EMS. Per  and outpatient records, with exception to her Ativan, patient is not taking any of her medications, and is very manic at home. Records also show she fired her outpatient psychiatrist, and has not found another yet. She presents very disheveled, with rapid and pressured speech. Clearly not dressed appropriately to be outside last night.     Pt during interview, minimizing all her symptoms  Report feeling great, not sure why she is in hospital  Denies any stressor  Focused on getting Ativan - not interested in any other medication  Pt has recently fired her OP psychiatrist and not seeing anybody  Does not think she need any help  Want to go home  Not able to tell me the reason for wandering from home    OARRS report:    09/10/2021  1   09/10/2021  Lorazepam 1 MG Tablet  28.00  14 Wi Phi   [id number]   Dis (0144)   0  2.00 LME  [place]   08/26/2021  1   08/12/2021  Lorazepam 1 MG Tablet  28.00  14 Cy Sen   [id number]   Dis (0144)   1  2.00 L[place]   08/19/2021  1   07/06/2021  Gabapentin 300 MG Capsule  90.00  30 Cy Sen   [id number]   Dis (0144)   1   [place]   08/12/2021  1   08/12/2021  Lorazepam 1 MG Tablet  28.00  14 Cy Sen   [id number]   Dis (0144)   0  2.00 LME  [place]   07/30/2021  1   07/30/2021  Lorazepam 1 MG Tablet  30.00  30 Cy Sen   [id number]   Dis (0144)   0  1.00 L[place]   07/29/2021  1 07/29/2021  Zolpidem Tartrate 5 MG Tablet  14.00  14 Ba Feliciano   [id number]   Dis (0144)   0  0.25 L[place]   07/15/2021  1   07/15/2021  Lorazepam 1 MG Tablet  30.00  10 Ra Ernesto   [id number]   Dis (0144)   0  3.00 L[place]   07/06/2021  1   07/06/2021  Gabapentin 300 MG Capsule  90.00  30 Cy Sen   [id number]   Dis (0144)   0   [place]   07/03/2021  1   07/02/2021  Lorazepam 1 MG Tablet  30.00  10 Cy Sen   [id number]   Dis (0144)   0  3.00 LME  [place]   06/25/2021  1   06/25/2021  Lorazepam 1 MG Tablet  30.00  10 Cy Sen   [id number]   Dis (0144)   0  3.00 L[place]   06/01/2021  1   05/30/2021  Gabapentin 400 MG Capsule  90.00  30 Roderick   [id number]   Dis (0144)   0   Comm Ins   [place]   05/31/2021  1   05/30/2021  Lorazepam 1 MG Tablet  90.00  30 Sherine Olea   [id number]   [place] (5653)   0  3.00 LME  [place]   05/03/2021  1   05/03/2021  Lorazepam 0.5 MG Tablet  30.00  30 Cy Sen   [id number]   Dis (0144)   0  0.50 LME  [place]   05/03/2021  1   05/03/2021  Lorazepam 1 MG Tablet  30.00  10 Cy Sen   [id number]   Dis (0144)   0  3.00 L[place]   04/21/2021  1   01/19/2021  Gabapentin 300 MG Capsule  90.00  30 Cy Sen   [id number]   Dis (0144)   3   [place]   04/07/2021  1   04/06/2021  Lorazepam 1 MG Tablet  120.00  30 Ra Ernesto   [id number]   Dis (0144)   0  4.00 LME  [place]   03/24/2021  1   01/19/2021  Gabapentin 300 MG Capsule  90.00  30 Cy Sen   [id number]   Dis (0144)   2   [place]   03/24/2021  1   03/24/2021  Lorazepam 1 MG Tablet  90.00  30 Cy Sen   [id number]   Dis (0144)   0  3.00 L[place]   03/08/2021  1   02/24/2021  Lorazepam 1 MG Tablet  56.00  14 Cy Sen   [id number]   Dis (0144)   1  4.00 L[place]   02/24/2021  1   02/24/2021  Lorazepam 0.5 MG Tablet  14.00  14 Cy Sen   [id number]   Dis (0144)   0  0.50 LME  [place]   02/24/2021  1   02/24/2021  Lorazepam 1 MG Tablet  56.00  14 Cy Sen   [id number]   Dis (0144)   0  4.00 LME  [place]   02/17/2021  1 01/19/2021  Gabapentin 300 MG Capsule  90.00  30 Cy Sen   [id number]   Dis (0144)   1   [place]   02/10/2021  1   02/10/2021  Lorazepam 1 MG Tablet  56.00  14 Cy Sen   [id number]   Dis (0144)   0  4.00 LME  [place]   02/10/2021  1   02/10/2021  Lorazepam 0.5 MG Tablet  14.00  14 Cy Sen   [id number]   Dis (0144)   0  0.50 LME  [place]   01/27/2021  1   01/27/2021  Lorazepam 1 MG Tablet  70.00  14 Cy Sen   [id number]   Dis (0144)   0  5.00 L[place]   01/19/2021  1   01/19/2021  Gabapentin 300 MG Capsule  90.00  30 Cy Sen   [id number]   Dis (0144)   0   [place]   11/18/2020  1   11/17/2020  Gabapentin 300 MG Capsule  90.00  30 Cy Sen   [id number]   Dis (0144)   0   [place]   11/17/2020  1   11/17/2020  Lorazepam 1 MG Tablet  150.00  30 Cy Sen   [id number]   Dis (0144)   0  5.00 L[place]   11/11/2020  1   11/11/2020  Lorazepam 1 MG Tablet  35.00  7 Cy Sen   [id number]   Joyce (3313)   0  5.00 LME  [place]   [place]   10/21/2020  1   05/18/2020  Gabapentin 300 MG Capsule  90.00  30 Cy Sen   [id number]   Dis (0144)   1   [place]   10/19/2020  1   10/19/2020  Lorazepam 1 MG Tablet  150.00  30 Cy Sen   [id number]   Dis (0144)   0  5.00 L[place]   09/23/2020  1   09/23/2020  Lorazepam 2 MG Tablet  90.00  30 Cy Sen   [id number]   Dis (0144)   0  6.00 L[place]   09/20/2020  1   05/18/2020  Gabapentin 300 MG Capsule  90.00  30 Cy Sen   [id number]   Dis (0144)   0   [place]   07/31/2020  1   07/31/2020  Lorazepam 2 MG Tablet  105.00  27 Cy Sen   [id number]   Dis (0144)   0  7.78 LME  [place]   07/20/2020  1   05/18/2020  Gabapentin 300 MG Capsule  90.00  30 Cy Sen   [id number]   Dis (0144)   0   [place]   07/06/2020  1   07/06/2020  Lorazepam 2 MG Tablet  105.00  26 Cy Sen   [id number]   Joyce (6649)   0  8.08 LME  [place]   [place]       PAST MEDICAL HISTORY:       Diagnosis Date    Anxiety     Cancer ([facility] 05/10/2017    DCIS left breast    Depression     Manic depression ([facility]     Migraines     Neuropathy     Thyroid disease        MEDICAL ROS: denies somatic complaints     ALLERGIES: Sulfa antibiotics, Clonidine, Prochlorperazine, and Varenicline    PAST PSYCHIATRIC HISTORY:   Prior Diagnosis: Generalized Anxiety Disorder, Major Depressive Disorder  Rule out Bipolar Disorder   Psychiatrist: Dr. Jannet Mace Medications: lorazepam (Ativan), gabapentin  Therapist: none  Hospitalization: yes a amonth ago  Hx of Suicidal Attempts: yes, several years ago reported she had attempted suicide   Hx of violence:  no  ECT: no    Psychiatric Review of Systems       Depression: denies      Irene or Hypomania:  patient denies, but has had significant symptoms of impulsivity and irritability      Panic Attacks:  no     Phobias:  no     Obsessions and Compulsions:  no     PTSD : no      Hallucinations:  no      Delusions:  no     Appetite normal     Sleep disturbance Yes probably sleeping 7-9 hours      Fatigue \"never\"      Loss of pleasure No     Impulsive behavior Yes reported by family in chart, denied by patient      FAMILY PSYCHIATRIC HISTORY: :my daughter has Bipolar Disorder    SOCIAL HISTORY:   Born and Raised: [place]   Describes Childhood: good   Education: almost had a four year degree in psychology then found out I had cancer - breast cancer diagnosis 3.5 years prior   Employment: [employer] with [employer]ur [employer]:   Children: has two childrens, Srinivas Verduzco and verna   Current Support:    Legal Hx: none  Access to weapons?:  none     Substance Abuse History:  ETOH: denies any  Marijuana: no   Opiates: no   Other Drugs: no    VITALS: BP (!) 147/70   Pulse 68   Temp 98.1 °F (36.7 °C) (Oral)   Resp 18   Ht 5' 8\" (1.727 m)   Wt 155 lb (70.3 kg)   SpO2 99%   BMI 23.57 kg/m²     MENTAL STATUS EXAM:   Appearance    alert, smiling  Speech    rapid and hyperverbal  Mood    Irritability hypomanic   Affect    labile affect  Thought Content    excessive preoccupations, intrusive thoughts and paranoid thoughts  Thought Process    coherent and circumstantial  Associations    logical connections  Insight    Poor  Judgment    Impaired  Orientation    oriented to person, place, time, and general circumstances  Memory    recent and remote memory intact  Attention/Concentration   poor      LABS:   Admission on 09/17/2021   Component Date Value Ref Range Status    SARS-CoV-2, NAAT 09/17/2021 Not Detected  Not Detected Final    Comment: Rapid NAAT:   Negative results should be treated as presumptive and,  if inconsistent with clinical signs and symptoms or necessary for  patient management, should be tested with an alternative molecular  assay. Negative results do not preclude SARS-CoV-2 infection and  should not be used as the sole basis for patient management decisions. This test has been authorized by the FDA under an Emergency Use  Authorization (EUA) for use by authorized laboratories.     Fact sheet for Healthcare Providers:  Britney.susan  Fact sheet for Patients: Britney.susan    METHODOLOGY: Isothermal Nucleic Acid Amplification     Admission on 09/17/2021, Discharged on 09/17/2021   Component Date Value Ref Range Status    Ventricular Rate 09/17/2021 68  BPM Preliminary    Atrial Rate 09/17/2021 68  BPM Preliminary    P-R Interval 09/17/2021 164  ms Preliminary    QRS Duration 09/17/2021 70  ms Preliminary    Q-T Interval 09/17/2021 400  ms Preliminary    QTc Calculation (Bazett) 09/17/2021 425  ms Preliminary    P Axis 09/17/2021 71  degrees Preliminary    R Axis 09/17/2021 3  degrees Preliminary    T Axis 09/17/2021 43  degrees Preliminary    Sodium 09/17/2021 142  135 - 144 mEq/L Final    Potassium 09/17/2021 4.2  3.4 - 4.9 mEq/L Final    Chloride 09/17/2021 105  95 - 107 mEq/L Final    CO2 09/17/2021 22  20 - 31 mEq/L Final    Anion Gap 09/17/2021 15  9 - 15 mEq/L Final    Glucose 09/17/2021 97  70 - 99 Final    Lymphocytes Absolute 09/17/2021 1.4  1.2 - 3.7 K/uL Final    Monocytes Absolute 09/17/2021 0.6  0.2 - 0.9 K/uL Final    Eosinophils Absolute 09/17/2021 0.2  0.0 - 0.4 K/uL Final    Basophils Absolute 09/17/2021 0.0  0.0 - 0.1 K/uL Final    Color, UA 09/17/2021 Yellow  Straw/Yellow Final    Clarity, UA 09/17/2021 Clear  Clear Final    Glucose, Ur 09/17/2021 Negative  Negative mg/dL Final    Bilirubin Urine 09/17/2021 Negative  Negative Final    Ketones, Urine 09/17/2021 40* Negative mg/dL Final    Specific Gravity, UA 09/17/2021 1.025  1.005 - 1.030 Final    Blood, Urine 09/17/2021 Trace-intact  Negative Final    pH, UA 09/17/2021 5.5  5.0 - 9.0 Final    Protein, UA 09/17/2021 Negative  Negative mg/dL Final    Urobilinogen, Urine 09/17/2021 0.2  <2.0 E.U./dL Final    Nitrite, Urine 09/17/2021 Negative  Negative Final    Leukocyte Esterase, Urine 09/17/2021 Trace  Negative Final    Urine Reflex to Culture 09/17/2021 Not Indicated   Final    Ethanol Lvl 09/17/2021 <10  mg/dL Final    Ethanol percent 09/17/2021 Not indicated  G/dL Final    Salicylate, Serum 51/25/7310 <0.3* 15.0 - 30.0 mg/dL Final    Comment: Effective:  3/11/2014  Methodology and/or Reference Range has changed. Anti-pyretic:  3.0-10.0 mg/dL  Anti-inflammatory:  15.0-30.0 mg/dL  Toxic: >30.0 mg/dL      PCP Screen, Urine 09/17/2021 Neg  Negative <25 ng/mL Final    Benzodiazepine Screen, Urine 09/17/2021 POSITIVE* Negative <150 ng/mL Final    Comment: Effective:  7/16/18  Methodology and/or Reference Range-Cutoff has changed.  Cocaine Metabolite Screen, Urine 09/17/2021 Neg  Negative <150 ng/mL Final    Comment: Effective:  7/16/18  Methodology and/or Reference Range-Cutoff has changed.  Amphetamine Screen, Urine 09/17/2021 Neg  Negative <500 ng/mL Final    Comment: Effective:  7/16/18  Methodology and/or Reference Range-Cutoff has changed.       Cannabinoid Scrn, Ur 09/17/2021 Neg  Negative <50 ng/mL Final    Opiate Scrn, Ur 09/17/2021 Neg  Negative <100 ng/mL Final    Comment: Effective:  7/16/18  Methodology and/or Reference Range-Cutoff has changed.  Barbiturate Screen, Ur 09/17/2021 Neg  Negative <200 ng/mL Final    Comment: Effective:  7/16/18  Methodology and/or Reference Range-Cutoff has changed.  Tricyclic 29/36/2801 Neg  Negative <300 ng/mL Final    Comment: Effective:  7/16/18  Methodology and/or Reference Range-Cutoff has changed.  Drug Screen Comment: 09/17/2021 see below   Final    Comment: This method is a screening test to detect only these drug  classes as part of a medical workup. Confirmatory testing  by another method should be ordered if clinically indicated.  Acetaminophen Level 09/17/2021 <15  10 - 30 ug/mL Final    WBC, UA 09/17/2021 0-2  0 - 5 /HPF Final    RBC, UA 09/17/2021 0-2  0 - 2 /HPF Final    Epithelial Cells, UA 09/17/2021 3-5  /HPF Final    Bacteria, UA 09/17/2021 RARE* Negative /HPF Final           MEDICATIONS: Current Facility-Administered Medications: acetaminophen (TYLENOL) tablet 1,000 mg, 1,000 mg, Oral, Once  haloperidol lactate (HALDOL) injection 5 mg, 5 mg, IntraMUSCular, Q6H PRN **OR** haloperidol (HALDOL) tablet 5 mg, 5 mg, Oral, Q6H PRN  hydrOXYzine (VISTARIL) capsule 50 mg, 50 mg, Oral, Q6H PRN **OR** hydrOXYzine (VISTARIL) injection 50 mg, 50 mg, IntraMUSCular, Q6H PRN  acetaminophen (TYLENOL) tablet 650 mg, 650 mg, Oral, Q4H PRN  traZODone (DESYREL) tablet 50 mg, 50 mg, Oral, Nightly PRN  benztropine mesylate (COGENTIN) injection 2 mg, 2 mg, IntraMUSCular, BID PRN  magnesium hydroxide (MILK OF MAGNESIA) 400 MG/5ML suspension 30 mL, 30 mL, Oral, Daily PRN  nicotine (NICODERM CQ) 21 MG/24HR 1 patch, 1 patch, TransDERmal, Daily     ASSESSMENT:     DIAGNOSIS:   Bipolar 1 derrick severe  Secondary Diagnosis Post-Traumatic Stress Disorder     PLAN: Patient admitted to 3W general program for further evaluation, treatment and safety.  Daily vitals, regular diet provided. Patient will be started on depakote and lorazepam (Ativan), her home medications for derrick. PRN medications for agitation, anxiety and sleep are in place. Patient will be encouraged to participate in individual, group and milieu therapy. Patient will be discharged when clinically stable. Please note that case has been discussed with treatment team and notes have been reviewed.        Signed:  Kayleen Briggs MD  9/17/2021  12:32 PM

## 2021-09-17 NOTE — ED PROVIDER NOTES
CHOLECALCIFEROL (VITAMIN D) 2000 UNITS TABS TABLET    Take 1 tablet by mouth daily    GABAPENTIN (NEURONTIN) 300 MG CAPSULE    Take 300 mg by mouth 3 times daily. John Rosalino LEVOTHYROXINE (SYNTHROID) 25 MCG TABLET    Take 1 tablet by mouth Daily    LORAZEPAM (ATIVAN) 1 MG TABLET    Take 1 tablet by mouth nightly.     LURASIDONE (LATUDA) 40 MG TABS TABLET    Take 1 tablet by mouth Daily with supper    SUMATRIPTAN (IMITREX) 100 MG TABLET    TAKE 1 TABLET BY MOUTH once AS NEEDED for FOR MIGRAINE    TRIAMTERENE-HYDROCHLOROTHIAZIDE (MAXZIDE-25) 37.5-25 MG PER TABLET    Take 1 tablet by mouth daily       ALLERGIES     Sulfa antibiotics, Clonidine, Prochlorperazine, and Varenicline    FAMILY HISTORY       Family History   Problem Relation Age of Onset    High Blood Pressure Mother     High Cholesterol Mother     Cancer Father         brain cancer    Stroke Father     Other Father         ulcerative colitis    High Blood Pressure Father     Cancer Sister         uterine cancer    High Blood Pressure Sister     High Blood Pressure Brother           SOCIAL HISTORY       Social History     Socioeconomic History    Marital status:      Spouse name: None    Number of children: None    Years of education: None    Highest education level: None   Occupational History    None   Tobacco Use    Smoking status: Current Every Day Smoker     Packs/day: 1.00     Years: 13.00     Pack years: 13.00     Types: Cigarettes    Smokeless tobacco: Never Used    Tobacco comment: patient denies   Vaping Use    Vaping Use: Never used   Substance and Sexual Activity    Alcohol use: Yes     Comment: occasional     Drug use: No    Sexual activity: Yes   Other Topics Concern    None   Social History Narrative    None     Social Determinants of Health     Financial Resource Strain:     Difficulty of Paying Living Expenses:    Food Insecurity:     Worried About Running Out of Food in the Last Year:     Rocío of Food in the Last Year:    Transportation Needs:     Lack of Transportation (Medical):  Lack of Transportation (Non-Medical):    Physical Activity:     Days of Exercise per Week:     Minutes of Exercise per Session:    Stress:     Feeling of Stress :    Social Connections:     Frequency of Communication with Friends and Family:     Frequency of Social Gatherings with Friends and Family:     Attends Congregational Services:     Active Member of Clubs or Organizations:     Attends Club or Organization Meetings:     Marital Status:    Intimate Partner Violence:     Fear of Current or Ex-Partner:     Emotionally Abused:     Physically Abused:     Sexually Abused:          PHYSICAL EXAM       ED Triage Vitals   BP Temp Temp src Pulse Resp SpO2 Height Weight   -- -- -- -- -- -- -- --       Physical Exam  Vitals and nursing note reviewed. Constitutional:       Appearance: She is well-developed. HENT:      Head: Normocephalic. Right Ear: External ear normal.      Left Ear: External ear normal.   Eyes:      Conjunctiva/sclera: Conjunctivae normal.      Pupils: Pupils are equal, round, and reactive to light. Cardiovascular:      Rate and Rhythm: Normal rate and regular rhythm. Heart sounds: Normal heart sounds. Pulmonary:      Effort: Pulmonary effort is normal.      Breath sounds: Normal breath sounds. Abdominal:      General: Bowel sounds are normal. There is no distension. Palpations: Abdomen is soft. Tenderness: There is no abdominal tenderness. Musculoskeletal:         General: Normal range of motion. Cervical back: Normal range of motion and neck supple. Skin:     General: Skin is warm and dry. Neurological:      Mental Status: She is alert and oriented to person, place, and time. Psychiatric:      Comments: Pressured speech, flight of ideas, circumferential thoughts           MDM  60 yo female presents to the ED with likely derrick. Pt is afebrile, hemodynamically stable. Covid negative. Labs done at [facility] unremarkable. UA negative. Pt is medically clear for psych evaluation. Case discussed with Dr. Elgin Cotton (psych) who recommended admission. Pt admitted to psych in stable condition. FINAL IMPRESSION      1.  Bipolar 1 disorder [facility])          DISPOSITION/PLAN   DISPOSITION Decision To Admit 09/17/2021 09:56:47 AM        DISCHARGE MEDICATIONS:  [unfilled]         Tristin Delgado MD(electronically signed)  Attending Emergency Physician            Tristin Delgado MD  09/17/21 [id number]

## 2021-09-17 NOTE — ED NOTES
Patient pulled IV out stating she did not need it anymore.  Gonzalez Bears aware     Jones Zimmerman RN  09/17/21 5626

## 2021-09-17 NOTE — ED NOTES
Provisional Diagnosis:    Bipolar 1    Psychosocial and Contextual Factors:    Lives with her  in [place], [facility]    C-SSRS Summary:     Patient: C-SSRS Suicide Screening  1) Within the past month, have you wished you were dead or wished you could go to sleep and not wake up? : No  2) Have you actually had any thoughts of killing yourself? : No  6) Have you ever done anything, started to do anything, or prepared to do anything to end your life?: No    Family:  she lives    Agency: [facility]          Abuse Assessment  Physical Abuse: Denies  Verbal Abuse: Denies  Emotional abuse: Denies  Financial Abuse: Denies  Sexual abuse: Denies  Elder abuse: No    Clinical Summary:    59year old female presented from [facility] after being found by [facility]. Per notes, she was reported missing last night by her . She presents manic and fixated on getting a flu shot. She denies any issues, and was stating \"Imnot suicidal or homicidal\" as she was coming down the castaneda with EMS. Per  and outpatient records, with exception to her Ativan, patient is not taking any of her medications, and is very manic at home. Records also show she fired her outpatient psychiatrist, and has not found another yet. She presents very disheveled, with rapid and pressured speech. Clearly not dressed appropriately to be outside last night. Level of Care Disposition:      Per Dr Lundberg Quant - admit to 3w. Standard PRNs only due to non-compliance with other home medications.      Nai Ulloa RN  09/17/21 [id number]
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
## 2021-09-17 NOTE — ED NOTES
Spoke with [place] [employer] dispatch, patient was found walking down [facility] [place] [facility] by [place]andeep, Officer Geovani Padilla brought pt to ED, patient has a history of wandering off and bipolar.  Pts  notified and is coming to ED per dispatch     [facility], RN  09/17/21 [id number]
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
## 2021-09-17 NOTE — PROGRESS NOTES
Patient arrived to [address] [place] [address] accompanied by [employer]. Patient is familiar with the unit and declined a tour. Contraband check is negative and skin check is negative with the exception of scratches on right forearm that are red with small dots if fresh blood and scrap to her right knee with what appears to be fresh scabs. Patient reports she got them in the woods playing with her dog.

## 2021-09-17 NOTE — PROGRESS NOTES
Patient has been isolative to her room and naps at long intervals. She took a phone call from her  and became loud and vulgar calling him names, and demanding he comes to get her. Explained to patient that she would not be permitted to leave and the Dr. Nehemias Emmanuel be in to see her tomorrow. Patient is grandiose, entitled and can be irritable. She denies SI, HI, or hallucinations.

## 2021-09-17 NOTE — SUICIDE SAFETY PLAN
SAFETY PLAN    A suicide Safety Plan is a document that supports someone when they are having thoughts of suicide. Warning Signs that indicate a suicidal crisis may be developing: What (situations, thoughts, feelings, body sensations, behaviors, etc.) do you experience that lets you know you are beginning to think about suicide? 1. anxiety        Internal Coping Strategies:  What things can I do (relaxation techniques, hobbies, physical activities, etc.) to take my mind off my problems without contacting another person? 1. working  2. exercise  3. yardwork    People and social settings that provide distraction: Who can I call or where can I go to distract me? 1. Name: [employer]  Phone: [phone]  2. Name: [employer]   Phone: has#   3. Place: [facility]          4. Place: yard    People whom I can ask for help: Who can I call when I need help - for example, friends, family, clergy, someone else? 1. Name: Kem Somers           Phone: has#  2. Name: [employer] Phone: [phone]  3. Name: [employer] Phone: has#    Professionals or [facility] agencies I can contact during a crisis: Who can I call for help - for example, my doctor, my psychiatrist, my psychologist, a mental health provider, a suicide hotline? 1. Clinician Name:Dr. Elaine Cuba  Phone:has#            2. Suicide Prevention Lifeline: 3-986-615-TALK (6686)    3. [address] [place] [address] Emergency Services -  for example, [facility] suicide hotline, [facility]: [facility]      Emergency Services Address: [address] [facility] [address] [place]      Emergency Services Phone: [phone]    Making the environment safe: How can I make my environment (house/apartment/living space) safer? For example, can I remove guns, medications, and other items? 1. Having her  there   2.  No weapons in the home

## 2021-09-17 NOTE — ED NOTES
Patient and family updated with POC and patient transported to ED [facility], [facility]  09/17/21 6535

## 2021-09-17 NOTE — PROGRESS NOTES
Patient was laying in bed in her room. She was awake and alert. She is familiar with the unit and this writer. She was talkative, anxious, she denies feeling depressed or stressed. She denies being suicidal.  She denies any auditory or visual hallucinations. She lacks insight. She denies the need to be here. Patient stated the police took her to [facility] and then she came here. She does not know why she is here. She stated \"I went to get my hair done and I was walking home\". Per chart, patient was missing since 7 pm last night. She lives with her  and he is supportive. She enjoys walking, watching TV and reading.  Electronically signed by Abhinav Jauregui [address] on 9/17/2021 at 11:59 AM

## 2021-09-18 PROBLEM — F31.13 BIPOLAR DISORDER WITH SEVERE MANIA (HCC): Status: ACTIVE | Noted: 2021-07-20

## 2021-09-18 LAB — TSH REFLEX: 0.87 UIU/ML (ref 0.44–3.86)

## 2021-09-18 PROCEDURE — 6370000000 HC RX 637 (ALT 250 FOR IP): Performed by: NURSE PRACTITIONER

## 2021-09-18 PROCEDURE — 6370000000 HC RX 637 (ALT 250 FOR IP): Performed by: PSYCHIATRY & NEUROLOGY

## 2021-09-18 PROCEDURE — 99232 SBSQ HOSP IP/OBS MODERATE 35: CPT | Performed by: PSYCHIATRY & NEUROLOGY

## 2021-09-18 PROCEDURE — 84443 ASSAY THYROID STIM HORMONE: CPT

## 2021-09-18 PROCEDURE — 6370000000 HC RX 637 (ALT 250 FOR IP): Performed by: STUDENT IN AN ORGANIZED HEALTH CARE EDUCATION/TRAINING PROGRAM

## 2021-09-18 PROCEDURE — G0378 HOSPITAL OBSERVATION PER HR: HCPCS

## 2021-09-18 PROCEDURE — 36415 COLL VENOUS BLD VENIPUNCTURE: CPT

## 2021-09-18 RX ORDER — IBUPROFEN 600 MG/1
600 TABLET ORAL EVERY 6 HOURS PRN
Status: DISCONTINUED | OUTPATIENT
Start: 2021-09-18 | End: 2021-09-19

## 2021-09-18 RX ORDER — ANASTROZOLE 1 MG/1
1 TABLET ORAL DAILY
Status: DISCONTINUED | OUTPATIENT
Start: 2021-09-18 | End: 2021-09-28 | Stop reason: HOSPADM

## 2021-09-18 RX ADMIN — VALPROIC ACID 250 MG: 250 SOLUTION ORAL at 20:26

## 2021-09-18 RX ADMIN — HYDROXYZINE PAMOATE 50 MG: 50 CAPSULE ORAL at 10:07

## 2021-09-18 RX ADMIN — NICOTINE POLACRILEX 2 MG: 2 GUM, CHEWING BUCCAL at 07:46

## 2021-09-18 RX ADMIN — IBUPROFEN 600 MG: 600 TABLET, FILM COATED ORAL at 18:35

## 2021-09-18 RX ADMIN — HYDROXYZINE PAMOATE 50 MG: 50 CAPSULE ORAL at 18:35

## 2021-09-18 RX ADMIN — HYDROXYZINE PAMOATE 50 MG: 50 CAPSULE ORAL at 01:42

## 2021-09-18 RX ADMIN — NICOTINE POLACRILEX 2 MG: 2 GUM, CHEWING BUCCAL at 11:12

## 2021-09-18 RX ADMIN — NICOTINE POLACRILEX 2 MG: 2 GUM, CHEWING BUCCAL at 14:00

## 2021-09-18 RX ADMIN — ACETAMINOPHEN 650 MG: 325 TABLET ORAL at 12:51

## 2021-09-18 RX ADMIN — ACETAMINOPHEN 650 MG: 325 TABLET ORAL at 01:42

## 2021-09-18 RX ADMIN — VALPROIC ACID 250 MG: 250 SOLUTION ORAL at 08:52

## 2021-09-18 RX ADMIN — LORAZEPAM 0.5 MG: 0.5 TABLET ORAL at 08:52

## 2021-09-18 RX ADMIN — LORAZEPAM 0.5 MG: 0.5 TABLET ORAL at 20:26

## 2021-09-18 RX ADMIN — ANASTROZOLE 1 MG: 1 TABLET, COATED ORAL at 11:53

## 2021-09-18 RX ADMIN — IBUPROFEN 600 MG: 600 TABLET, FILM COATED ORAL at 08:53

## 2021-09-18 RX ADMIN — NICOTINE POLACRILEX 2 MG: 2 GUM, CHEWING BUCCAL at 19:27

## 2021-09-18 ASSESSMENT — ENCOUNTER SYMPTOMS
EYE PAIN: 0
STRIDOR: 0
COLOR CHANGE: 0
ABDOMINAL DISTENTION: 0
EYE ITCHING: 0
ABDOMINAL PAIN: 0
WHEEZING: 0
EYE DISCHARGE: 0
BLOOD IN STOOL: 0
CHEST TIGHTNESS: 0
FACIAL SWELLING: 0
BACK PAIN: 0
SHORTNESS OF BREATH: 0
ANAL BLEEDING: 0
CONSTIPATION: 0
DIARRHEA: 0

## 2021-09-18 ASSESSMENT — PAIN SCALES - GENERAL
PAINLEVEL_OUTOF10: 0
PAINLEVEL_OUTOF10: 5
PAINLEVEL_OUTOF10: 2
PAINLEVEL_OUTOF10: 5
PAINLEVEL_OUTOF10: 5
PAINLEVEL_OUTOF10: 3

## 2021-09-18 NOTE — PROGRESS NOTES
[facility] FOLLOW-UP NOTE       9/18/2021     Patient was seen and examined in person, Chart reviewed   Patient's case discussed with staff/team    Chief Complaint: derrick    Interim History:     Pt lost significant jane loss - 30 lb since covid last month  Feel good with weight loss  Pt feel less manic today  Less grandiose  Still has racing thoughts  Poor insight  Pt report that she went to meet her friend and her  was concerned  Flights of ideas    Appetite:   [] Normal/Unchanged  [] Increased  [x] Decreased      Sleep:       [] Normal/Unchanged  [x] Fair       [] Poor              Energy:    [] Normal/Unchanged  [x] Increased  [] Decreased        SI [] Present  [] Absent    HI  []Present  [] Absent     Aggression:  [] yes  [] no    Patient is [] able  [x] unable to CONTRACT FOR SAFETY     PAST MEDICAL/PSYCHIATRIC HISTORY:   Past Medical History:   Diagnosis Date    Anxiety     Cancer ([facility] 05/10/2017    DCIS left breast    Depression     Manic depression ([facility]     Migraines     Neuropathy     Thyroid disease        FAMILY/SOCIAL HISTORY:  Family History   Problem Relation Age of Onset    High Blood Pressure Mother     High Cholesterol Mother     Cancer Father         brain cancer    Stroke Father     Other Father         ulcerative colitis    High Blood Pressure Father     Cancer Sister         uterine cancer    High Blood Pressure Sister     High Blood Pressure Brother      Social History     Socioeconomic History    Marital status:      Spouse name: Not on file    Number of children: Not on file    Years of education: Not on file    Highest education level: Not on file   Occupational History    Not on file   Tobacco Use    Smoking status: Current Every Day Smoker     Packs/day: 1.00     Years: 13.00     Pack years: 13.00     Types: Cigarettes    Smokeless tobacco: Never Used    Tobacco comment: patient denies   Vaping Use    Vaping Use: Never used   Substance and Sexual Activity    Alcohol use: Yes     Comment: occasional     Drug use: No    Sexual activity: Yes   Other Topics Concern    Not on file   Social History Narrative    Not on file     Social Determinants of Health     Financial Resource Strain:     Difficulty of Paying Living Expenses:    Food Insecurity:     Worried About Running Out of Food in the Last Year:     [address] in the Last Year:    Transportation Needs:     Lack of Transportation (Medical):  Lack of Transportation (Non-Medical):    Physical Activity:     Days of Exercise per Week:     Minutes of Exercise per Session:    Stress:     Feeling of Stress :    Social Connections:     Frequency of Communication with Friends and Family:     Frequency of Social Gatherings with Friends and Family:     Attends Yazidi Services:     Active Member of Clubs or Organizations:     Attends Club or Organization Meetings:     Marital Status:    Intimate Partner Violence:     Fear of Current or Ex-Partner:     Emotionally Abused:     Physically Abused:     Sexually Abused:            ROS:  [x] All negative/unchanged except if checked.  Explain positive(checked items) below:  [] Constitutional  [] Eyes  [] Ear/Nose/Mouth/Throat  [] Respiratory  [] CV  [] GI  []   [] Musculoskeletal  [] Skin/Breast  [] Neurological  [] Endocrine  [] Heme/Lymph  [] Allergic/Immunologic    Explanation:     MEDICATIONS:    Current Facility-Administered Medications:     ibuprofen (ADVIL;MOTRIN) tablet 600 mg, 600 mg, Oral, Q6H PRN, IDALMIS Jaramillo - CNP, 600 mg at 09/18/21 0853    haloperidol lactate (HALDOL) injection 5 mg, 5 mg, IntraMUSCular, Q6H PRN **OR** haloperidol (HALDOL) tablet 5 mg, 5 mg, Oral, Q6H PRN, Natanael Loomis MD    hydrOXYzine (VISTARIL) capsule 50 mg, 50 mg, Oral, Q6H PRN, 50 mg at 09/18/21 0142 **OR** hydrOXYzine (VISTARIL) injection 50 mg, 50 mg, IntraMUSCular, Q6H PRN, Cheyenne Yarbrough Starr Powers MD    acetaminophen (TYLENOL) tablet 650 mg, 650 mg, Oral, Q4H PRN, Michael Thompson MD, 650 mg at 09/18/21 0142    traZODone (DESYREL) tablet 50 mg, 50 mg, Oral, Nightly PRN, Michael Thompson MD    benztropine mesylate (COGENTIN) injection 2 mg, 2 mg, IntraMUSCular, BID PRN, Michael Thompson MD    magnesium hydroxide (MILK OF MAGNESIA) 400 MG/5ML suspension 30 mL, 30 mL, Oral, Daily PRN, Michael Thompson MD    valproic acid (DEPAKENE) 250 MG/5ML oral solution 250 mg, 250 mg, Oral, BID, Michael Thompson MD, 250 mg at 09/18/21 4025    LORazepam (ATIVAN) tablet 0.5 mg, 0.5 mg, Oral, BID, Michael Thompson MD, 0.5 mg at 09/18/21 9035    nicotine polacrilex (NICORETTE) gum 2 mg, 2 mg, Oral, PRN, Michael Thompson MD, 2 mg at 09/18/21 0746      Examination:  BP (!) 155/103   Pulse 90   Temp 98.1 °F (36.7 °C) (Oral)   Resp 18   Ht 5' 8\" (1.727 m)   Wt 155 lb (70.3 kg)   SpO2 99%   BMI 23.57 kg/m²   Gait - steady  Medication side effects(SE): no    Mental Status Examination:    Level of consciousness:  within normal limits   Appearance:  poor grooming and poor hygiene  Behavior/Motor:  hyperactive  Attitude toward examiner:  playful  Speech:  rapid   Mood: labile  Affect:  mood congruent  Thought processes:  rapid   Thought content:  Delusions:  grandiose  Perceptual Disturbance:  denies any perceptual disturbance  Cognition:  oriented to person, place, and time   Concentration poor  Insight poor   Judgement poor     ASSESSMENT:   Patient symptoms are:  [] Well controlled  [] Improving  [] Worsening  [x] No change      Diagnosis:   Active Problems:    Bipolar disorder with severe derrick ([facility]  Resolved Problems:    * No resolved hospital problems.  *      LABS:    Recent Labs     09/17/21  0733   WBC 8.7   HGB 13.4        Recent Labs     09/17/21  0733      K 4.2      CO2 22   BUN 18   CREATININE 0.63   GLUCOSE 97     Recent Labs     09/17/21  0733   BILITOT 0.4   ALKPHOS 57 AST 27   ALT 16     Lab Results   Component Value Date    LABAMPH Neg 09/17/2021    BARBSCNU Neg 09/17/2021    LABBENZ POSITIVE 09/17/2021    LABMETH Neg 07/19/2021    OPIATESCREENURINE Neg 09/17/2021    PHENCYCLIDINESCREENURINE Neg 09/17/2021    ETOH <10 09/17/2021     Lab Results   Component Value Date    TSH 2.060 07/19/2021     No results found for: LITHIUM  No results found for: VALPROATE, CBMZ    RISK ASSESSMENT: high risk of impulsive    Treatment Plan:  Reviewed current Medications with the patient. Medication as ordered  Risks, benefits, side effects, drug-to-drug interactions and alternatives to treatment were discussed. Collateral information:   CD evaluation  Encourage patient to attend group and other milieu activities.   Discharge planning discussed with the patient and treatment team.    PSYCHOTHERAPY/COUNSELING:  [x] Therapeutic interview  [x] Supportive  [] CBT  [] Ongoing  [] Other    [x] Patient continues to need, on a daily basis, active treatment furnished directly by or requiring the supervision of inpatient psychiatric personnel      Anticipated Length of stay:            Electronically signed by Bryant Meza MD on 9/18/2021 at 9:29 AM

## 2021-09-18 NOTE — GROUP NOTE
Group Therapy Note    Date: 9/18/2021    Group Start Time: 1000  Group End Time: 1050  Group Topic: Psychoeducation    [facility]    Yary Smith        Group Therapy Note    Attendees: 10         Patient's Goal: \"Stay calm, cool and collect. To have a great day\"    Notes:  Patient attended the 1000 skills group. She was distracted, slightly anxious and she work minimal on her task.     Status After Intervention:  Unchanged    Participation Level: Minimal    Participation Quality: adequate      Speech:  Mostly quiet      Thought Process/Content: Linear      Affective Functioning: Flat      Mood: anxious      Level of consciousness:  Preoccupied      Response to Learning: Progressing to goal      Endings: None Reported    Modes of Intervention: Education, Socialization and Activity      Discipline Responsible: Psychoeducational Specialist      Signature:  Yary Smith

## 2021-09-18 NOTE — PROGRESS NOTES
Patient is given Motrin for pain and does take prn nicotine gum this am. Patient does talk about how \"The delta kicked my As$, I lost weight and was out for a week. \" patient also states that she had corona virus earlier in the year.   Electronically signed by Ziggy Miles LPN on 0/40/9180 at 1:04 AM

## 2021-09-18 NOTE — H&P
[address] MEDICINE    HISTORY AND PHYSICAL EXAM    PATIENT NAME:  Olya Ramos    MRN:  [id number]  SERVICE DATE:  9/18/2021   SERVICE TIME:  10:49 AM    Primary Care Physician: Kassandra Broussard DO     SUBJECTIVE  CHIEF COMPLAINT:  Irene    HPI:  Olya Ramos is a 59 y.o., , female who  has a past medical history of Anxiety, Cancer ([facility] Depression, Manic depression ([facility] Migraines, Neuropathy, and Thyroid disease. that is hospitalized for acute irene. Per Ed chart, patient was found noted to be reported as missing and was found yesterday by [place] police. After being found, patient was brought in to the ED for further evaluation. Patient has multiple admissions for her psych disorders in [facility] as well as [facility]. Per , patient has not been taking her medications and was uncomfortable taking patient home. This was discussed with psych, and recommended admission for further management. Medicine has been placed on consult for medical management while patient is hospitalized. On further question in inpatient psychiatric unit, patient regards she overall feels well and is reading books. Her recollection of the events were that she was just out to get a haircut and she was walking her dogs and she was brought the the hospital. Denies any current SI or HI. With regards to her medications, she reports that she does not take her blood pressure medications and has not taken these in some time. In addition, does not take medications for her thyroid disease and only takes her medications for her breast cancer. She reports no additional issues at this time.      HPI     PAST MEDICAL HISTORY:    Past Medical History:   Diagnosis Date    Anxiety     Cancer ([facility] 05/10/2017    DCIS left breast    Depression     Manic depression (HCC)     Migraines     Neuropathy     Thyroid disease      PAST SURGICAL HISTORY:    Past Surgical History:   Procedure Laterality Date    BREAST BIOPSY Left 5/23/2017    INJECTION METHYLENE BLUE LEFT BREAST ULTRASOUND GUIDED LEFT BREAST LUMPECTOMY LEFT SENTINEL NODE BIOPSY, 90 MINS, EVICEL 2ML, PAT ON ADMIT  performed by [facility]e [facility] at [facility]      t flap     BREAST SURGERY Left 6/27/13    exploration of left nipple with excisonal bx of ducts    BUNIONECTOMY      2006   [address], 1990    COLONOSCOPY      HYSTERECTOMY      HYSTERECTOMY, TOTAL ABDOMINAL      with BSO    MASTECTOMY Left     MN COLON CA SCRN NOT  [address] [facility] N/A 9/25/2018    COLONOSCOPY performed by Madisyn Goodman MD at [facility]:    Family History   Problem Relation Age of Onset    High Blood Pressure Mother     High Cholesterol Mother     Cancer Father         brain cancer    Stroke Father     Other Father         ulcerative colitis    High Blood Pressure Father     Cancer Sister         uterine cancer    High Blood Pressure Sister     High Blood Pressure Brother      SOCIAL HISTORY:    Social History     Socioeconomic History    Marital status:      Spouse name: Not on file    Number of children: Not on file    Years of education: Not on file    Highest education level: Not on file   Occupational History    Not on file   Tobacco Use    Smoking status: Current Every Day Smoker     Packs/day: 1.00     Years: 13.00     Pack years: 13.00     Types: Cigarettes    Smokeless tobacco: Never Used    Tobacco comment: patient denies   Vaping Use    Vaping Use: Never used   Substance and Sexual Activity    Alcohol use: Yes     Comment: occasional     Drug use: No    Sexual activity: Yes   Other Topics Concern    Not on file   Social History Narrative    Not on file     Social Determinants of Health     Financial Resource Strain:     Difficulty of Paying Living Expenses:    Food Insecurity:     Worried About Running Out of Food in the Last Year:     [place] of Food in the Last Year: Transportation Needs:     Lack of Transportation (Medical):  Lack of Transportation (Non-Medical):    Physical Activity:     Days of Exercise per Week:     Minutes of Exercise per Session:    Stress:     Feeling of Stress :    Social Connections:     Frequency of Communication with Friends and Family:     Frequency of Social Gatherings with Friends and Family:     Attends Holiness Services:     Active Member of Clubs or Organizations:     Attends Club or Organization Meetings:     Marital Status:    Intimate Partner Violence:     Fear of Current or Ex-Partner:     Emotionally Abused:     Physically Abused:     Sexually Abused:      MEDICATIONS:   Prior to Admission medications    Medication Sig Start Date End Date Taking? Authorizing Provider   LORazepam (ATIVAN) 1 MG tablet Take 1 tablet by mouth nightly. 7/29/21   Chong Camargo MD   lurasidone (LATUDA) 40 MG TABS tablet Take 1 tablet by mouth Daily with supper 7/29/21   Chong Camargo MD   triamterene-hydroCHLOROthiazide (MAXZIDE-25) 37.5-25 MG per tablet Take 1 tablet by mouth daily    Historical Provider, MD   anastrozole (ARIMIDEX) 1 MG tablet TAKE 1 TABLET EVERY DAY 7/8/19   Janelle Brian DO   levothyroxine (SYNTHROID) 25 MCG tablet Take 1 tablet by mouth Daily 11/5/18   Cris Queen DO   SUMAtriptan (IMITREX) 100 MG tablet TAKE 1 TABLET BY MOUTH once AS NEEDED for FOR MIGRAINE 11/2/18   Cris Queen DO   Calcium Carbonate Antacid (TUMS PO) Take by mouth    Historical Provider, MD   Cholecalciferol (VITAMIN D) 2000 units TABS tablet Take 1 tablet by mouth daily 4/5/18   Cris Queen DO   gabapentin (NEURONTIN) 300 MG capsule Take 300 mg by mouth 3 times daily. . 2/22/17   Historical Provider, MD       ALLERGIES: Sulfa antibiotics, Clonidine, Prochlorperazine, and Varenicline    REVIEW OF SYSTEM:   Review of Systems   Constitutional: Negative for activity change, appetite change, chills, fatigue and fever.    HENT: She is alert and oriented to person, place, and time. Mental status is at baseline. GCS: GCS eye subscore is 4. GCS verbal subscore is 5. GCS motor subscore is 6. Cranial Nerves: Cranial nerves are intact. No cranial nerve deficit, dysarthria or facial asymmetry. Sensory: Sensation is intact. Motor: Motor function is intact. No weakness. Coordination: Coordination is intact. Gait: Gait is intact. Comments: CN II-XII intact   Psychiatric:         Attention and Perception: She is inattentive. Mood and Affect: Mood is anxious and elated. Speech: Speech is rapid and pressured and tangential.         Behavior: Behavior is hyperactive. BP (!) 155/103   Pulse 90   Temp 98.1 °F (36.7 °C) (Oral)   Resp 18   Ht 5' 8\" (1.727 m)   Wt 155 lb (70.3 kg)   SpO2 99%   BMI 23.57 kg/m²     DATA:     Diagnostic tests reviewed for today's visit:    Most recent labs and imaging results reviewed. LABS:  No results found for this or any previous visit (from the past 24 hour(s)). IMAGING:  No results found.     ASSESSMENT AND PLAN  Active Problems:    Bipolar disorder with severe derrick (HCC)  Plan: management per psychiatry team    #HTN  Was previously on thiazide, however, patient has not been taking these  BP was elevated in ED, will continue to monitor, withhold starting thiazide at this time as it could potentially cause hyponatremia with initiation of psychiatric medications  If continues to be elevated could consider CCB vs ACE    #Hypothyroidism  Unsure how compliant patient has been with synthroid  Will check TSH and potentially initiate Hormone replacement based on results, last draw in 07/19/21 was noted to be 2.060    #Breast Cx  -continue home arimidex as patient has been compliant with this    #DVTppx  ambulate        Plan of care discussed with: patient    Additional work up or/and treatment plan may be added today or then after based on clinical progression. I am managing a portion of pt care. Some medical issues are handled byother specialists. Additional work up and treatment should be done in out pt setting by pt PCP and other out pt providers. In addition to examining and evaluating pt, I spent additional time explaining care, normaland abnormal findings, and treatment plan. All of pt questions were answered. Counseling, diet and education were provided. Case will be discussed with nursing staff when appropriate. Family will be updated if and whenappropriate.       SIGNATURE: Alecia Gregorio DO  DATE: September 18, 2021  TIME: 10:49 AM

## 2021-09-18 NOTE — PROGRESS NOTES
Patient did not attend group despite staff encouragement.   Electronically signed by Demetrio Manzanares on 9/18/2021 at 5:04 PM

## 2021-09-18 NOTE — PROGRESS NOTES
Pt medicated with PRN tylenol for bilateral foot pains from walking so much and PRN vistaril for anxiety @ 0142.

## 2021-09-18 NOTE — PROGRESS NOTES
Pt. attended the 0900 community meeting. Electronically signed by Mark Marquis [address] on 9/18/2021 at 9:50 AM

## 2021-09-18 NOTE — PROGRESS NOTES
Pt states she would like to leave tomorrow and to get her driving privileges back. Pt states she no one should be able to take her driving rights away.  Orders received for PRN ibuprofen per pt c/o pain/ request.
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
## 2021-09-18 NOTE — GROUP NOTE
Group Therapy Note    Date: 9/18/2021    Group Start Time: 1400  Group End Time: 7614  Group Topic: Healthy Living/Wellness    [facility]    Vincent Schaumann, RN        Group Therapy Note    Attendees: 9      Patient's Goal:  Cultivate a Positive Mindset    Notes:   Education provided on ways to seek positivity while adopting a healthy lifestyle with self-assessment handout to reflect on areas for personal growth.    Status After Intervention:  Unchanged    Participation Level: Monopolizing    Participation Quality:normal Attentive, Sharing and Supportive      Speech:  Normal      Thought Process/Content: Logical      Affective Functioning: Congruent      Mood: elevated      Level of consciousness:  Alert, Oriented x4 and Attentive      Response to Learning: Progressing to goal      Endings: None Reported    Modes of Intervention: Education, Support, Socialization and Exploration      Discipline Responsible: Registered Nurse      Signature:  Vincent Schaumann, RN

## 2021-09-18 NOTE — PROGRESS NOTES
Patient comes to the desk and asks for vistaril for anxiety 7/10.  Electronically signed by Colten Chapman LPN on 4/01/7815 at 89:28 AM

## 2021-09-18 NOTE — GROUP NOTE
Group Therapy Note    Date: 9/18/2021    Group Start Time: 1100  Group End Time: 2403  Group Topic: Healthy Living/Wellness    [facility]    Francia Burciaga RN        Group Therapy Note    Attendees: 8       Notes:  Patient did not participate in group      Discipline Responsible: Registered Nurse      Signature:  Francia Burciaga RN

## 2021-09-19 PROBLEM — F31.13 BIPOLAR AFFECTIVE DISORDER, MANIC, SEVERE (HCC): Status: ACTIVE | Noted: 2021-09-19

## 2021-09-19 PROCEDURE — 6370000000 HC RX 637 (ALT 250 FOR IP): Performed by: STUDENT IN AN ORGANIZED HEALTH CARE EDUCATION/TRAINING PROGRAM

## 2021-09-19 PROCEDURE — 99231 SBSQ HOSP IP/OBS SF/LOW 25: CPT | Performed by: PSYCHIATRY & NEUROLOGY

## 2021-09-19 PROCEDURE — 1240000000 HC EMOTIONAL WELLNESS R&B

## 2021-09-19 PROCEDURE — 6370000000 HC RX 637 (ALT 250 FOR IP): Performed by: PSYCHIATRY & NEUROLOGY

## 2021-09-19 RX ORDER — LORAZEPAM 0.5 MG/1
0.25 TABLET ORAL 2 TIMES DAILY
Status: DISCONTINUED | OUTPATIENT
Start: 2021-09-19 | End: 2021-09-23

## 2021-09-19 RX ADMIN — LORAZEPAM 0.5 MG: 0.5 TABLET ORAL at 08:35

## 2021-09-19 RX ADMIN — LORAZEPAM 0.25 MG: 0.5 TABLET ORAL at 20:28

## 2021-09-19 RX ADMIN — VALPROIC ACID 250 MG: 250 SOLUTION ORAL at 20:28

## 2021-09-19 RX ADMIN — HYDROXYZINE PAMOATE 50 MG: 50 CAPSULE ORAL at 03:14

## 2021-09-19 RX ADMIN — NICOTINE POLACRILEX 2 MG: 2 GUM, CHEWING BUCCAL at 08:35

## 2021-09-19 RX ADMIN — ACETAMINOPHEN 650 MG: 325 TABLET ORAL at 07:07

## 2021-09-19 RX ADMIN — ACETAMINOPHEN 650 MG: 325 TABLET ORAL at 13:35

## 2021-09-19 RX ADMIN — HYDROXYZINE PAMOATE 50 MG: 50 CAPSULE ORAL at 10:44

## 2021-09-19 RX ADMIN — HYDROXYZINE PAMOATE 50 MG: 50 CAPSULE ORAL at 19:36

## 2021-09-19 RX ADMIN — NICOTINE POLACRILEX 2 MG: 2 GUM, CHEWING BUCCAL at 15:08

## 2021-09-19 RX ADMIN — ANASTROZOLE 1 MG: 1 TABLET, COATED ORAL at 08:35

## 2021-09-19 RX ADMIN — VALPROIC ACID 250 MG: 250 SOLUTION ORAL at 08:35

## 2021-09-19 RX ADMIN — ACETAMINOPHEN 650 MG: 325 TABLET ORAL at 19:36

## 2021-09-19 ASSESSMENT — PAIN SCALES - GENERAL
PAINLEVEL_OUTOF10: 3
PAINLEVEL_OUTOF10: 5
PAINLEVEL_OUTOF10: 0
PAINLEVEL_OUTOF10: 7
PAINLEVEL_OUTOF10: 3
PAINLEVEL_OUTOF10: 5

## 2021-09-19 NOTE — PROGRESS NOTES
Activity group was called when this writer asked to meet with patient for her assessment, so explained I'd meet with her after group, however pt. Reports she is not going to go to any more meetings her because she was upset after his appointment with Psychiatrist this morning and asked to speak with SW.  So, walked with patient to her room for physical assessment. Patient denies SI/HI, Denies depression, rates her anxiety at a 9/10, and denies auditory or visual hallucinations. Patient reports she is feeling fine and just wants to go home. Patient denies any further concerns then wanting to talk with SW, then said she was going to read her books to try to calm down.

## 2021-09-19 NOTE — PROGRESS NOTES
Patient given tylenol 650 mg po PRN at 1936 for complaint of head ache 7/10. Patient given vistaril 50 mg po PRN at 1936 for complaint of anxiety.

## 2021-09-19 NOTE — GROUP NOTE
Group Therapy Note    Date: 9/19/2021    Group Start Time: 1100  Group End Time: 1140  Group Topic: Group Therapy    [facility]    ERNST Kwan        Group Therapy Note    Attendees: 7         Patient's Goal: To participate in a goal oriented group. Notes: Patient stated her goal is to get released and see her three year old grandson before he returns to his home out of state. Status After Intervention:  Unchanged    Participation Level: Active Listener    Participation Quality: Appropriate      Speech:  normal      Thought Process/Content: Logical      Affective Functioning: Congruent      Mood: elevated      Level of consciousness:  Alert      Response to Learning: Able to verbalize current knowledge/experience      Endings: None Reported    Modes of Intervention: Education      Discipline Responsible: /Counselor      Signature:   ERNST Kwan

## 2021-09-19 NOTE — PROGRESS NOTES
Behavioral Services  Medicare Certification Upon Admission    I certify that this patient's inpatient psychiatric hospital admission is medically necessary for:    [x] (1) Treatment which could reasonably be expected to improve this patient's condition,       [x] (2) Or for diagnostic study;     AND     [x](2) The inpatient psychiatric services are provided while the individual is under the care of a physician and are included in the individualized plan of care.     Estimated length of stay/service 3-5 days    Plan for post-hospital care OP care    Electronically signed by Caitlin Paulson MD on 9/19/2021 at 9:26 AM

## 2021-09-19 NOTE — PROGRESS NOTES
Pt. attended the 0900 community meeting. Electronically signed by Kerwin Jamil, [address] on 9/19/2021 at 12:53 PM

## 2021-09-19 NOTE — GROUP NOTE
Group Therapy Note    Date: 9/19/2021    Group Start Time: 2087  Group End Time: 1900  Group Topic: Recreational    [facility]    Lashae Blackwood        Group Therapy Note    Attendees: 10/16         Patient's Goal:  To play Heads Up with the group. Notes:  Patient arrived towards the end of group, but did participate.     Status After Intervention:  Unchanged    Participation Level: Interactive    Participation Quality: Appropriate and Attentive      Speech:  normal      Thought Process/Content: Logical      Affective Functioning: Congruent      Mood: euthymic      Level of consciousness:  Alert and Attentive      Response to Learning: Progressing to goal      Endings: None Reported    Modes of Intervention: Activity      Discipline Responsible: [address], [employer]      Signature:  Lashae Blackwood

## 2021-09-19 NOTE — PROGRESS NOTES
Patient did not attend group despite staff encouragement.   Electronically signed by Shannan Mueller on 9/18/2021 at 8:08 PM

## 2021-09-19 NOTE — PROGRESS NOTES
Pt. refused to attend the 1000 skills group, despite staff encouragement. Electronically signed by Mony Ann, [address] on 9/19/2021 at 12:54 PM

## 2021-09-19 NOTE — PROGRESS NOTES
Patient did not attend group despite staff encouragement.   Electronically signed by Fabricio Aguirre on 9/19/2021 at 7:16 PM

## 2021-09-19 NOTE — PROGRESS NOTES
Patient asks this nurse to get her weight. Patient is put on the scale and is 165 lbs.  Electronically signed by Jaison Alex LPN on 2/73/7967 at 19:99 AM
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
## 2021-09-19 NOTE — PROGRESS NOTES
Patient did not attend group despite staff encouragement.   Electronically signed by Sylvia Freitas on 9/18/2021 at 9:14 PM

## 2021-09-20 PROCEDURE — 6370000000 HC RX 637 (ALT 250 FOR IP): Performed by: STUDENT IN AN ORGANIZED HEALTH CARE EDUCATION/TRAINING PROGRAM

## 2021-09-20 PROCEDURE — 1240000000 HC EMOTIONAL WELLNESS R&B

## 2021-09-20 PROCEDURE — 6370000000 HC RX 637 (ALT 250 FOR IP): Performed by: PSYCHIATRY & NEUROLOGY

## 2021-09-20 PROCEDURE — 99232 SBSQ HOSP IP/OBS MODERATE 35: CPT | Performed by: PSYCHIATRY & NEUROLOGY

## 2021-09-20 RX ORDER — PALIPERIDONE 3 MG/1
3 TABLET, EXTENDED RELEASE ORAL DAILY
Status: DISCONTINUED | OUTPATIENT
Start: 2021-09-20 | End: 2021-09-28 | Stop reason: HOSPADM

## 2021-09-20 RX ORDER — RISPERIDONE 0.5 MG/1
0.5 TABLET, FILM COATED ORAL 2 TIMES DAILY
Status: DISCONTINUED | OUTPATIENT
Start: 2021-09-20 | End: 2021-09-20

## 2021-09-20 RX ORDER — ACETAMINOPHEN 80 MG
TABLET,CHEWABLE ORAL ONCE
Status: COMPLETED | OUTPATIENT
Start: 2021-09-20 | End: 2021-09-20

## 2021-09-20 RX ADMIN — HYDROXYZINE PAMOATE 50 MG: 50 CAPSULE ORAL at 00:08

## 2021-09-20 RX ADMIN — Medication: at 09:00

## 2021-09-20 RX ADMIN — ACETAMINOPHEN 650 MG: 325 TABLET ORAL at 13:41

## 2021-09-20 RX ADMIN — HYDROXYZINE PAMOATE 50 MG: 50 CAPSULE ORAL at 12:18

## 2021-09-20 RX ADMIN — NICOTINE POLACRILEX 2 MG: 2 GUM, CHEWING BUCCAL at 19:12

## 2021-09-20 RX ADMIN — TRAZODONE HYDROCHLORIDE 50 MG: 50 TABLET ORAL at 00:08

## 2021-09-20 RX ADMIN — ANASTROZOLE 1 MG: 1 TABLET, COATED ORAL at 09:02

## 2021-09-20 RX ADMIN — VALPROIC ACID 250 MG: 250 SOLUTION ORAL at 20:51

## 2021-09-20 RX ADMIN — PALIPERIDONE 3 MG: 3 TABLET, EXTENDED RELEASE ORAL at 12:16

## 2021-09-20 RX ADMIN — ACETAMINOPHEN 650 MG: 325 TABLET ORAL at 09:01

## 2021-09-20 RX ADMIN — NICOTINE POLACRILEX 2 MG: 2 GUM, CHEWING BUCCAL at 20:51

## 2021-09-20 RX ADMIN — NICOTINE POLACRILEX 2 MG: 2 GUM, CHEWING BUCCAL at 15:57

## 2021-09-20 RX ADMIN — NICOTINE POLACRILEX 2 MG: 2 GUM, CHEWING BUCCAL at 10:57

## 2021-09-20 RX ADMIN — LORAZEPAM 0.25 MG: 0.5 TABLET ORAL at 09:02

## 2021-09-20 RX ADMIN — LORAZEPAM 0.25 MG: 0.5 TABLET ORAL at 20:51

## 2021-09-20 RX ADMIN — NICOTINE POLACRILEX 2 MG: 2 GUM, CHEWING BUCCAL at 07:28

## 2021-09-20 RX ADMIN — VALPROIC ACID 250 MG: 250 SOLUTION ORAL at 09:02

## 2021-09-20 RX ADMIN — NICOTINE POLACRILEX 2 MG: 2 GUM, CHEWING BUCCAL at 13:41

## 2021-09-20 RX ADMIN — ACETAMINOPHEN 650 MG: 325 TABLET ORAL at 20:51

## 2021-09-20 ASSESSMENT — PAIN SCALES - GENERAL
PAINLEVEL_OUTOF10: 0
PAINLEVEL_OUTOF10: 3
PAINLEVEL_OUTOF10: 0
PAINLEVEL_OUTOF10: 4
PAINLEVEL_OUTOF10: 10
PAINLEVEL_OUTOF10: 1

## 2021-09-20 NOTE — GROUP NOTE
Group Therapy Note    Date: 9/20/2021    Group Start Time: 6603  Group End Time: 6196  Group Topic: Healthy Living/Wellness    [facility]    Alejandra Samayoa        Group Therapy Note    Attendees: 9/15         PPatient's Goal:  To learn how to live a healthy lifestyle. Topics covered included sleep hygiene and coping skills. Notes:  Patient participated in group discussion. Patient was monopolizing at times, but was redirected by this writer.     Status After Intervention:  Unchanged    Participation Level: Interactive and Monopolizing    Participation Quality: Appropriate, Attentive, Sharing and Intrusive      Speech:  pressured      Thought Process/Content: Linear      Affective Functioning: Exaggerated      Mood: elevated      Level of consciousness:  Alert and Attentive      Response to Learning: Able to verbalize current knowledge/experience      Endings: None Reported    Modes of Intervention: Education      Discipline Responsible: [address], [employer]      Signature:  Alejandra Samayoa

## 2021-09-20 NOTE — CARE COORDINATION
Daughter , Sofia Laguna [facility] ) # [phone] and wanted an update. Daughter said once patient is home she goes off her meds. They had discussed an injectable but patient refused. Family stated that Dr. Torsten Daily who has saw patient for over 20 years and saw her a month ago but refuses to complete an expert eval form. Dr. Torsten Daily prescribed  8 mg a day of Ativan for 20 years. Patient is now seeing Ju Leggett NP who stated he cannot complete the expert eval since he is not an MD. Daughter stated that she does not believe [facility] will complete this either and is asking if [facility] doctor can complete the expert eval.   Will update daughter tomorrow.

## 2021-09-20 NOTE — GROUP NOTE
Group Therapy Note    Date: 9/20/2021    Group Start Time: 1000  Group End Time: 1100  Group Topic: Psychoeducation    [facility] [place], CTRS        Group Therapy Note    Attendees: 12       Patient's Goal:  \"to go home\"    Notes:  Pt. attended the 1000 skill group. Easily distracted. Tearful with a song on the radio and left the session only to return again. Makes random statements. Status After Intervention:  Unchanged    Participation Level:  Active Listener and Interactive    Participation Quality: Appropriate, Attentive, Sharing and Intrusive      Speech:  normal      Thought Process/Content: Logical  Flight of ideas      Affective Functioning: Flat      Mood: labile      Level of consciousness:  Alert, Oriented x4, Attentive and Inattentive      Response to Learning: Able to retain information, Able to change behavior and Progressing to goal      Endings: None Reported    Modes of Intervention: Education, Support, Socialization and Activity      Discipline Responsible: Psychoeducational Specialist      Signature:  Justice Castillo

## 2021-09-20 NOTE — PROGRESS NOTES
Explained and gave all am meds 1/2 tab of 0.5, 0.25, pt voiced understanding, pt presented with rapid speech,denied depression ,reports anxiety#10, denies problems with sleep, denied any suicidal thoughts.

## 2021-09-20 NOTE — PROGRESS NOTES
Explained and gave newly ordered med invega 3mg , pt refused med education print out, pt aware Risperdal is discontinued. Pt requested vistaril,  50mg was given for generalized anxiety. Pt reports it takes a lot of meds to keep her down.  Pt is eating lunch now

## 2021-09-20 NOTE — PROGRESS NOTES
Patient did not attend group despite staff encouragement.   Electronically signed by Bishop Baez on 9/19/2021 at 9:37 PM

## 2021-09-20 NOTE — GROUP NOTE
Group Therapy Note    Date: 9/20/2021    Group Start Time: 1132  Group End Time: 1430  Group Topic: Healthy Living/Wellness    [facility]    Rakesh Caballero RN        Group Therapy Note    Attendees: 6/15         Patient's Goal:  Discuss importance of self esteem. Notes:  Hyper talkative.      Status After Intervention:  Unchanged    Participation Level: Monopolizing    Participation Quality: Sharing      Speech:  pressured      Thought Process/Content: Flight of ideas      Affective Functioning: Congruent      Mood: euthymic      Level of consciousness:  Alert and Attentive      Response to Learning: Able to verbalize current knowledge/experience and Able to verbalize/acknowledge new learning      Endings: None Reported    Modes of Intervention: Education and Support      Discipline Responsible: Registered Nurse      Signature:  Rakesh Caballero RN

## 2021-09-20 NOTE — PROGRESS NOTES
[facility] [place] [facility] FOLLOW-UP NOTE       9/20/2021     Patient was seen and examined in person, Chart reviewed   Patient's case discussed with staff/team    Chief Complaint: Irene    Interim History:     Pt remain hypomanic and grandiose  Poor insight and Judgment  Pt did not want to be probated  Pt is agreeable to long acting injection, guardianship   Does not want to go to [address] [place] [address], [address]  Has been compliant with depakote    Appetite:   [] Normal/Unchanged  [] Increased  [x] Decreased      Sleep:       [] Normal/Unchanged  [x] Fair       [] Poor              Energy:    [] Normal/Unchanged  [] Increased  [x] Decreased        SI [] Present  [] Absent    HI  []Present  [] Absent     Aggression:  [] yes  [] no    Patient is [] able  [x] unable to CONTRACT FOR SAFETY     PAST MEDICAL/PSYCHIATRIC HISTORY:   Past Medical History:   Diagnosis Date    Anxiety     Cancer ([facility] 05/10/2017    DCIS left breast    Depression     Manic depression ([facility]     Migraines     Neuropathy     Thyroid disease        FAMILY/SOCIAL HISTORY:  Family History   Problem Relation Age of Onset    High Blood Pressure Mother     High Cholesterol Mother     Cancer Father         brain cancer    Stroke Father     Other Father         ulcerative colitis    High Blood Pressure Father     Cancer Sister         uterine cancer    High Blood Pressure Sister     High Blood Pressure Brother      Social History     Socioeconomic History    Marital status:      Spouse name: Not on file    Number of children: Not on file    Years of education: Not on file    Highest education level: Not on file   Occupational History    Not on file   Tobacco Use    Smoking status: Current Every Day Smoker     Packs/day: 1.00     Years: 13.00     Pack years: 13.00     Types: Cigarettes    Smokeless tobacco: Never Used    Tobacco comment: patient denies   Vaping Use    Vaping Use: Never used   Substance and Sexual Activity    Alcohol use: Yes     Comment: occasional     Drug use: No    Sexual activity: Yes   Other Topics Concern    Not on file   Social History Narrative    Not on file     Social Determinants of Health     Financial Resource Strain:     Difficulty of Paying Living Expenses:    Food Insecurity:     Worried About Running Out of Food in the Last Year:     [address] in the Last Year:    Transportation Needs:     Lack of Transportation (Medical):  Lack of Transportation (Non-Medical):    Physical Activity:     Days of Exercise per Week:     Minutes of Exercise per Session:    Stress:     Feeling of Stress :    Social Connections:     Frequency of Communication with Friends and Family:     Frequency of Social Gatherings with Friends and Family:     Attends Mandaeism Services:     Active Member of Clubs or Organizations:     Attends Club or Organization Meetings:     Marital Status:    Intimate Partner Violence:     Fear of Current or Ex-Partner:     Emotionally Abused:     Physically Abused:     Sexually Abused:            ROS:  [x] All negative/unchanged except if checked.  Explain positive(checked items) below:  [] Constitutional  [] Eyes  [] Ear/Nose/Mouth/Throat  [] Respiratory  [] CV  [] GI  []   [] Musculoskeletal  [] Skin/Breast  [] Neurological  [] Endocrine  [] Heme/Lymph  [] Allergic/Immunologic    Explanation:     MEDICATIONS:    Current Facility-Administered Medications:     risperiDONE (RISPERDAL) tablet 0.5 mg, 0.5 mg, Oral, BID, Sang Coronel MD    LORazepam (ATIVAN) tablet 0.25 mg, 0.25 mg, Oral, BID, Sang Coronel MD, 0.25 mg at 09/20/21 0902    anastrozole (ARIMIDEX) tablet 1 mg, 1 mg, Oral, Daily, Edilma Lyman Miniaci, DO, 1 mg at 09/20/21 0902    haloperidol lactate (HALDOL) injection 5 mg, 5 mg, IntraMUSCular, Q6H PRN **OR** haloperidol (HALDOL) tablet 5 mg, 5 mg, Oral, Q6H PRN, Sang Coronel MD    hydrOXYzine (VISTARIL) capsule 50 mg, 50 mg, Oral, Q6H PRN, 50 mg at 09/20/21 0008 **OR** hydrOXYzine (VISTARIL) injection 50 mg, 50 mg, IntraMUSCular, Q6H PRN, Ivy Lau MD    acetaminophen (TYLENOL) tablet 650 mg, 650 mg, Oral, Q4H PRN, Ivy Lau MD, 650 mg at 09/20/21 0901    traZODone (DESYREL) tablet 50 mg, 50 mg, Oral, Nightly PRN, Ivy Lau MD, 50 mg at 09/20/21 0008    benztropine mesylate (COGENTIN) injection 2 mg, 2 mg, IntraMUSCular, BID PRN, Ivy Lau MD    magnesium hydroxide (MILK OF MAGNESIA) 400 MG/5ML suspension 30 mL, 30 mL, Oral, Daily PRN, Ivy Lau MD    valproic acid (DEPAKENE) 250 MG/5ML oral solution 250 mg, 250 mg, Oral, BID, Ivy Lau MD, 250 mg at 09/20/21 0902    nicotine polacrilex (NICORETTE) gum 2 mg, 2 mg, Oral, PRN, Ivy Lau MD, 2 mg at 09/20/21 1066      Examination:  BP (!) 155/76   Pulse 74   Temp 98.8 °F (37.1 °C) (Oral)   Resp 14   Ht 5' 8\" (1.727 m)   Wt 165 lb (74.8 kg)   SpO2 98%   BMI 25.09 kg/m²   Gait - steady  Medication side effects(SE): no    Mental Status Examination:    Level of consciousness:  within normal limits   Appearance:  poor grooming and fair hygiene  Behavior/Motor:  no abnormalities noted  Attitude toward examiner:  playful  Speech:  rapid   Mood: labile  Affect:  mood incongruent  Thought processes:  flight of ideas   Thought content:  Delusions:  grandiose  Cognition:  oriented to person, place, and time   Concentration poor  Insight poor   Judgement poor     ASSESSMENT:   Patient symptoms are:  [] Well controlled  [] Improving  [] Worsening  [x] No change      Diagnosis:   Principal Problem:    Bipolar affective disorder, manic, severe ([facility]  Resolved Problems:    * No resolved hospital problems. *      LABS:    No results for input(s): WBC, HGB, PLT in the last 72 hours. No results for input(s): NA, K, CL, CO2, BUN, CREATININE, GLUCOSE in the last 72 hours.   No results for input(s): BILITOT, ALKPHOS, AST, ALT in the last 72 hours. Lab Results   Component Value Date    LABAMPH Neg 09/17/2021    BARBSCNU Neg 09/17/2021    LABBENZ POSITIVE 09/17/2021    LABMETH Neg 07/19/2021    OPIATESCREENURINE Neg 09/17/2021    PHENCYCLIDINESCREENURINE Neg 09/17/2021    ETOH <10 09/17/2021     Lab Results   Component Value Date    TSH 2.060 07/19/2021     No results found for: LITHIUM  No results found for: VALPROATE, CBMZ      Treatment Plan:  Reviewed current Medications with the patient. Meds as ordered  Risks, benefits, side effects, drug-to-drug interactions and alternatives to treatment were discussed. Collateral information:   CD evaluation  Encourage patient to attend group and other milieu activities.   Discharge planning discussed with the patient and treatment team.    PSYCHOTHERAPY/COUNSELING:  [x] Therapeutic interview  [x] Supportive  [] CBT  [] Ongoing  [] Other    [x] Patient continues to need, on a daily basis, active treatment furnished directly by or requiring the supervision of inpatient psychiatric personnel      Anticipated Length of stay:            Electronically signed by Natanael Mauricio MD on 9/20/2021 at 10:42 AM

## 2021-09-20 NOTE — PROGRESS NOTES
Pt. attended the 0900 community meeting.  Electronically signed by Candelaria Robert on 9/20/2021 at 9:28 AM

## 2021-09-20 NOTE — CARE COORDINATION
called for an update. Patient called her  today and told him we changed our mind and we are discharging her today and she needs $63.  was calling to make sure we were not discharging her. Assured  that we were not.  cannot come to visit today but will come tomorrow.

## 2021-09-20 NOTE — PROGRESS NOTES
Pt to nurse's station requesting meds for upset stomach. Pt was told she has nothing ordered but accepted gingerale and crackers. Few minutes later she approached nurse's station again wanting something for anxiety/panic attack. Pt stated \"I want all the police to come here now. Who security downstairs? What kind of animal wants to put me in a state hospital?\" Per pt she said that her  wants her to go to state hospitals. She continued to be uspet \"I have never been accused of a crime. He is a demon. \" She was given trazodone and vistaril. After taking the medications she immediately calmed and started talking about reading a book when she goes back to her room. Labile mood, tearful and laughing.

## 2021-09-20 NOTE — PROGRESS NOTES
Margarette Urbina is grandiose and intrusive. She was expansive about the need to be released because she does not need to be here. Insight and judgement ar impaired. She shares \"200 family member agree with me\". She blames others for conspiring against her to keep her in the hospital. Multiple times this AM she demanded the Police come be with her while the Doctor was here, because \"he's trying to send me away, and I don't even need to be here\". Patient denies SI, HI and reports she has things to do.

## 2021-09-20 NOTE — GROUP NOTE
Group Therapy Note    Date: 9/20/2021    Group Start Time: 1100  Group End Time: 1130  Group Topic: Psychoeducation    [facility]    JOAN Uriostegui, JEAN PAULW        Group Therapy Note    Attendees: 8         Patient's Goal:  To participate in a goal oriented group    Notes:  Patient was monopolizing with a flight of ideas    Status After Intervention:  Unchanged    Participation Level: Monopolizing    Participation Quality: Intrusive      Speech:  pressured      Thought Process/Content: Flight of ideas      Affective Functioning: Incongruent      Mood: elevated      Level of consciousness:  Alert      Response to Learning: Progressing to goal      Endings: None Reported    Modes of Intervention: Education      Discipline Responsible: /Counselor      Signature:  JOAN Uriostegui, PARVEZ

## 2021-09-20 NOTE — PROGRESS NOTES
Patient was medicated with Vistaril at her request for anxiety. She is also awaiting her dinner tray from the cafeteria.

## 2021-09-21 PROCEDURE — 1240000000 HC EMOTIONAL WELLNESS R&B

## 2021-09-21 PROCEDURE — 99232 SBSQ HOSP IP/OBS MODERATE 35: CPT | Performed by: PSYCHIATRY & NEUROLOGY

## 2021-09-21 PROCEDURE — 6370000000 HC RX 637 (ALT 250 FOR IP): Performed by: STUDENT IN AN ORGANIZED HEALTH CARE EDUCATION/TRAINING PROGRAM

## 2021-09-21 PROCEDURE — 6370000000 HC RX 637 (ALT 250 FOR IP): Performed by: PSYCHIATRY & NEUROLOGY

## 2021-09-21 RX ORDER — TRIAMTERENE AND HYDROCHLOROTHIAZIDE 37.5; 25 MG/1; MG/1
1 TABLET ORAL DAILY
Status: DISCONTINUED | OUTPATIENT
Start: 2021-09-21 | End: 2021-09-28 | Stop reason: HOSPADM

## 2021-09-21 RX ORDER — LEVOTHYROXINE SODIUM 0.05 MG/1
50 TABLET ORAL DAILY
Status: DISCONTINUED | OUTPATIENT
Start: 2021-09-21 | End: 2021-09-28 | Stop reason: HOSPADM

## 2021-09-21 RX ADMIN — TRIAMTERENE AND HYDROCHLOROTHIAZIDE 1 TABLET: 37.5; 25 TABLET ORAL at 14:36

## 2021-09-21 RX ADMIN — VALPROIC ACID 250 MG: 250 SOLUTION ORAL at 08:55

## 2021-09-21 RX ADMIN — ANASTROZOLE 1 MG: 1 TABLET, COATED ORAL at 08:55

## 2021-09-21 RX ADMIN — ACETAMINOPHEN 650 MG: 325 TABLET ORAL at 21:36

## 2021-09-21 RX ADMIN — NICOTINE POLACRILEX 2 MG: 2 GUM, CHEWING BUCCAL at 15:54

## 2021-09-21 RX ADMIN — ACETAMINOPHEN 650 MG: 325 TABLET ORAL at 10:15

## 2021-09-21 RX ADMIN — HYDROXYZINE PAMOATE 50 MG: 50 CAPSULE ORAL at 12:03

## 2021-09-21 RX ADMIN — VALPROIC ACID 250 MG: 250 SOLUTION ORAL at 20:28

## 2021-09-21 RX ADMIN — HYDROXYZINE PAMOATE 50 MG: 50 CAPSULE ORAL at 03:58

## 2021-09-21 RX ADMIN — LEVOTHYROXINE SODIUM 50 MCG: 0.05 TABLET ORAL at 14:36

## 2021-09-21 RX ADMIN — LORAZEPAM 0.25 MG: 0.5 TABLET ORAL at 08:55

## 2021-09-21 RX ADMIN — ACETAMINOPHEN 650 MG: 325 TABLET ORAL at 03:58

## 2021-09-21 RX ADMIN — LORAZEPAM 0.25 MG: 0.5 TABLET ORAL at 20:28

## 2021-09-21 RX ADMIN — NICOTINE POLACRILEX 2 MG: 2 GUM, CHEWING BUCCAL at 08:55

## 2021-09-21 RX ADMIN — TRAZODONE HYDROCHLORIDE 50 MG: 50 TABLET ORAL at 20:28

## 2021-09-21 RX ADMIN — PALIPERIDONE 3 MG: 3 TABLET, EXTENDED RELEASE ORAL at 08:54

## 2021-09-21 RX ADMIN — NICOTINE POLACRILEX 2 MG: 2 GUM, CHEWING BUCCAL at 14:18

## 2021-09-21 RX ADMIN — ACETAMINOPHEN 650 MG: 325 TABLET ORAL at 15:54

## 2021-09-21 RX ADMIN — NICOTINE POLACRILEX 2 MG: 2 GUM, CHEWING BUCCAL at 19:26

## 2021-09-21 ASSESSMENT — PAIN SCALES - GENERAL
PAINLEVEL_OUTOF10: 4
PAINLEVEL_OUTOF10: 2
PAINLEVEL_OUTOF10: 4
PAINLEVEL_OUTOF10: 2
PAINLEVEL_OUTOF10: 2
PAINLEVEL_OUTOF10: 4

## 2021-09-21 NOTE — PROGRESS NOTES
Pt. attended the 0900 community meeting. Electronically signed by Lance Moody [address] on 9/21/2021 at 10:01 AM

## 2021-09-21 NOTE — PROGRESS NOTES
Pt medicated per request with PRN tylenol for c/o generalized discomfort in feet and PRN vistaril for c/o anxiety.

## 2021-09-21 NOTE — PROGRESS NOTES
Positive results from PRN medication- pt has been resting in bed with eyes closed since administration.

## 2021-09-21 NOTE — PROGRESS NOTES
Explained and gave all am meds, pt was agreeable, reports anxiety is # 3-4, gave ativan 0.25 1/2 tab of 0.5, pt is bright , pleasant in good humor, less rapid pressured speech, denies depression, suicidal thoughts or voices.

## 2021-09-21 NOTE — CARE COORDINATION
FAMILY COLLATERAL NOTE    Family/Support Name: Tyler Chapin #: [phone]  Relationship to Pt: daughter       Placed call to above to update. Made daughter aware that patient has agreed to a long acting -Invega 1x a month. Patient also agreed to allow daughter to be her guardian. Probate was not followed through due to patient being agreeable with treatment. The doctor on 3W will complete the expert eval.  Daughter stated that patients  will be completing the guardianship papers since patient is out of state. Daughter stated that she will stay in [place] up to a week after patient is home for added support. Patients 's work is seasonal and he will be finished in 2-3 weeks. Guardianship papers for husbands portion will be left  At [employer] for  to  tonight when he visits.              Elayne Wiggins, [facility]

## 2021-09-21 NOTE — PROGRESS NOTES
Patient is more calm and less intrusive. Consents were reviewed. Patient spoke of taking the long acting medication after discharge. She is future oriented. Judgement is still poor but better. Speech is less pressured.

## 2021-09-21 NOTE — GROUP NOTE
Group Therapy Note    Date: 9/20/2021    Group Start Time: 2115  Group End Time: 2125  Group Topic: Wrap-Up    [facility]    Alejandra Samayoa        Group Therapy Note    Attendees: 9/16         Patient's Goal:  \"you remember, to wear clothes to the reception! My mom was supposed to bring them, but she didn't and I don't want to go anyway since I haven't seen them in 39 years. \"     Notes:  Patient reported not meeting their goal for the day. Patient reported she was happy she wasn't \"whiny\" today.     Status After Intervention:  Decompensated    Participation Level: Monopolizing    Participation Quality: Attentive, Sharing and Intrusive      Speech:  pressured      Thought Process/Content: Flight of ideas      Affective Functioning: Congruent      Mood: irritable      Level of consciousness:  Alert, Attentive and Preoccupied      Response to Learning: Progressing to goal      Endings: None Reported    Modes of Intervention: Support      Discipline Responsible: appening      Signature:  Alejandra Samayoa

## 2021-09-21 NOTE — GROUP NOTE
Group Therapy Note    Date: 9/21/2021    Group Start Time: 1000  Group End Time: 1050  Group Topic: Psychoeducation    [facility]    Tee Sprague        Group Therapy Note    Attendees: 9         Patient's Goal:  \"Work on going home\"    Notes:  Patient attended the 1000 skills group. Patient had a flat affect, low interests but she stay in group and work adequately on her task.     Status After Intervention:  Unchanged    Participation Level: Adequate    Participation Quality: Appropriate      Speech:  quiet      Thought Process/Content: Linear      Affective Functioning: Flat      Mood: depressed      Level of consciousness:  Preoccupied      Response to Learning: Progressing to goal      Endings: None Reported    Modes of Intervention: Education, Socialization and Activity      Discipline Responsible: Psychoeducational Specialist      Signature:  Tee Sprague

## 2021-09-21 NOTE — GROUP NOTE
Group Therapy Note    Date: 9/21/2021    Group Start Time: 1400  Group End Time: 1500  Group Topic: Psychoeducation    [facility]    JOAN Loredo LSW        Group Therapy Note    Attendees: 9         Patient's Goal:  To participate in coping skills oriented group    Notes:  Patient's coping skills are to read and listen to music    Status After Intervention:  Improved    Participation Level: active     Participation Quality: Sharing      Speech:  normal      Thought Process/Content: Logical      Affective Functioning: Congruent      Mood: calm      Level of consciousness:  Alert      Response to Learning: Able to verbalize current knowledge/experience      Endings: None Reported    Modes of Intervention: Education      Discipline Responsible: /Counselor      Signature:  JOAN Loredo LSW

## 2021-09-21 NOTE — PROGRESS NOTES
Pt accepted PRN tylenol for complaint of 3/10 back pain.  Electronically signed by Yobany Anaya RN on 9/20/21 at 8:53 PM EDT

## 2021-09-21 NOTE — PROGRESS NOTES
Patient visible on unit, doing puzzle at table. Patient pleasant. Reactive affect. Tangential. Pt less tangential than yesterday. Pressured speech. Good concentration. Patient states she has felt mildly anxious today and she believes she has had too much sugar. Pt denies depression. Pt denies SI, HI, and Hallucinations. Pt reports good sleep and appetite. Pt states she plans to follow up with a psych NP, a therapist, and take her long acting injection. Patient accepted PRN tylenol for 4/10 neck pain at 1554.  Electronically signed by Donna Lai RN on 9/21/21 at 4:00 PM EDT

## 2021-09-21 NOTE — GROUP NOTE
Group Therapy Note    Date: 9/21/2021    Group Start Time: 8804  Group End Time: 5918  Group Topic: Healthy Living/Wellness    [facility]    Khai Herman RN        Group Therapy Note    Attendees: 13/17         Patient's Goal:  Building coping skills and socialization.     Notes:  Alert and appropriate    Status After Intervention:  Unchanged    Participation Level: Interactive    Participation Quality: Appropriate and Attentive      Speech:  normal      Thought Process/Content: Logical  Linear      Affective Functioning: Flat      Mood: euthymic      Level of consciousness:  Alert and Oriented x4      Response to Learning: Able to verbalize current knowledge/experience and Able to verbalize/acknowledge new learning      Endings: None Reported    Modes of Intervention: Support and Socialization      Discipline Responsible: Registered Nurse      Signature:  Khai Herman RN

## 2021-09-21 NOTE — GROUP NOTE
Group Therapy Note    Date: 9/20/2021    Group Start Time: 2050  Group End Time: 2115  Group Topic: Relaxation    [facility]    Layne Rangel        Group Therapy Note    Attendees: 9/16         Patient's Goal:  To play Wii [employer] with the group. Notes:  Patient played the game with peers. Patient made several bizarre comments throughout the game, such as \"this is why I can't play the piano, but I can pay the drums\" and \"she won't be allowed back next time. \"    Status After Intervention:  Unchanged    Participation Level: Interactive    Participation Quality: Appropriate, Attentive, Supportive and Intrusive      Speech:  pressured      Thought Process/Content: Flight of ideas      Affective Functioning: Congruent      Mood: euthymic      Level of consciousness:  Alert and Attentive      Response to Learning: Progressing to goal      Endings: None Reported    Modes of Intervention: Activity      Discipline Responsible: [address], [employer]      Signature:  Layne Rangel

## 2021-09-22 PROCEDURE — 6370000000 HC RX 637 (ALT 250 FOR IP): Performed by: STUDENT IN AN ORGANIZED HEALTH CARE EDUCATION/TRAINING PROGRAM

## 2021-09-22 PROCEDURE — 6370000000 HC RX 637 (ALT 250 FOR IP): Performed by: PSYCHIATRY & NEUROLOGY

## 2021-09-22 PROCEDURE — 6370000000 HC RX 637 (ALT 250 FOR IP): Performed by: NURSE PRACTITIONER

## 2021-09-22 PROCEDURE — 99232 SBSQ HOSP IP/OBS MODERATE 35: CPT | Performed by: PSYCHIATRY & NEUROLOGY

## 2021-09-22 PROCEDURE — 1240000000 HC EMOTIONAL WELLNESS R&B

## 2021-09-22 RX ORDER — MECOBALAMIN 5000 MCG
5 TABLET,DISINTEGRATING ORAL NIGHTLY
Status: DISCONTINUED | OUTPATIENT
Start: 2021-09-22 | End: 2021-09-28 | Stop reason: HOSPADM

## 2021-09-22 RX ADMIN — LEVOTHYROXINE SODIUM 50 MCG: 0.05 TABLET ORAL at 06:20

## 2021-09-22 RX ADMIN — HYDROXYZINE PAMOATE 50 MG: 50 CAPSULE ORAL at 18:02

## 2021-09-22 RX ADMIN — ACETAMINOPHEN 650 MG: 325 TABLET ORAL at 05:15

## 2021-09-22 RX ADMIN — LORAZEPAM 0.25 MG: 0.5 TABLET ORAL at 08:51

## 2021-09-22 RX ADMIN — HYDROXYZINE PAMOATE 50 MG: 50 CAPSULE ORAL at 11:48

## 2021-09-22 RX ADMIN — VALPROIC ACID 250 MG: 250 SOLUTION ORAL at 20:03

## 2021-09-22 RX ADMIN — TRAZODONE HYDROCHLORIDE 50 MG: 50 TABLET ORAL at 21:36

## 2021-09-22 RX ADMIN — PALIPERIDONE 3 MG: 3 TABLET, EXTENDED RELEASE ORAL at 08:50

## 2021-09-22 RX ADMIN — HYDROXYZINE PAMOATE 50 MG: 50 CAPSULE ORAL at 05:15

## 2021-09-22 RX ADMIN — Medication 5 MG: at 23:34

## 2021-09-22 RX ADMIN — TRIAMTERENE AND HYDROCHLOROTHIAZIDE 1 TABLET: 37.5; 25 TABLET ORAL at 08:50

## 2021-09-22 RX ADMIN — NICOTINE POLACRILEX 2 MG: 2 GUM, CHEWING BUCCAL at 15:59

## 2021-09-22 RX ADMIN — LORAZEPAM 0.25 MG: 0.5 TABLET ORAL at 20:04

## 2021-09-22 RX ADMIN — VALPROIC ACID 250 MG: 250 SOLUTION ORAL at 08:50

## 2021-09-22 RX ADMIN — ACETAMINOPHEN 650 MG: 325 TABLET ORAL at 18:54

## 2021-09-22 RX ADMIN — NICOTINE POLACRILEX 2 MG: 2 GUM, CHEWING BUCCAL at 18:54

## 2021-09-22 RX ADMIN — ACETAMINOPHEN 650 MG: 325 TABLET ORAL at 10:47

## 2021-09-22 RX ADMIN — NICOTINE POLACRILEX 2 MG: 2 GUM, CHEWING BUCCAL at 18:02

## 2021-09-22 RX ADMIN — ANASTROZOLE 1 MG: 1 TABLET, COATED ORAL at 08:51

## 2021-09-22 RX ADMIN — NICOTINE POLACRILEX 2 MG: 2 GUM, CHEWING BUCCAL at 08:50

## 2021-09-22 RX ADMIN — NICOTINE POLACRILEX 2 MG: 2 GUM, CHEWING BUCCAL at 13:08

## 2021-09-22 ASSESSMENT — PAIN SCALES - GENERAL
PAINLEVEL_OUTOF10: 2
PAINLEVEL_OUTOF10: 4
PAINLEVEL_OUTOF10: 5
PAINLEVEL_OUTOF10: 0
PAINLEVEL_OUTOF10: 6

## 2021-09-22 NOTE — PROGRESS NOTES
Pt. attended the 0900 community meeting. Electronically signed by Lance Moody [address] on 9/22/2021 at 9:52 AM

## 2021-09-22 NOTE — GROUP NOTE
Group Therapy Note    Date: 9/22/2021    Group Start Time: 1000  Group End Time: 1050  Group Topic: Psychoeducation    [facility]    Jack Mckee        Group Therapy Note    Attendees: 13         Patient's Goal:  \"To go home\"    Notes:  Patient attended the 1000 skills group. She was preoccupied and work minimal on her task.     Status After Intervention:  Unchanged    Participation Level: Minimal    Participation Quality: Appropriate      Speech:  talkative      Thought Process/Content: Linear      Affective Functioning: Flat      Mood: anxious      Level of consciousness:  Preoccupied      Response to Learning: Progressing to goal      Endings: None Reported    Modes of Intervention: Education, Socialization and Activity      Discipline Responsible: Psychoeducational Specialist      Signature:  Jack Mckee

## 2021-09-22 NOTE — PROGRESS NOTES
Pt continues to have pressured speech, flight of ideas and is very intrusive with staff and other patients. Pt is redirectable but remains very suspicious of staff and the physician. Pt has been calling her daughter Eve Bardales at home and telling her that she will discontinue taking her medications once she is discharged. Daughter spoke to Norah Monahan and this nurse about discharge plan of care.

## 2021-09-22 NOTE — PROGRESS NOTES
Pt up to nurse's station, requesting Vistaril for \"anxiousness\" and Tylenol for 4/10 neck pain. Medicated per PRN order. No other complaints or concerns at this time.

## 2021-09-22 NOTE — GROUP NOTE
Group Therapy Note    Date: 9/20/2021    Group Start Time: 1435  Group End Time: 1099  Group Topic: Cognitive Skills    [facility]    Lisa De La Torre, [facility]        Group Therapy Note    Attendees: 7         Patient's Goal:  Identify positive coping skills    Notes:  Patient participated    Status After Intervention:  Improved    Participation Level: Interactive    Participation Quality: Appropriate      Speech:  normal      Thought Process/Content: Logical      Affective Functioning: Congruent      Mood: anxious      Level of consciousness:  Alert      Response to Learning: Progressing to goal      Endings: None Reported    Modes of Intervention: Support      Discipline Responsible: /Counselor      Signature:  Imtiaz Weaver, [facility]

## 2021-09-22 NOTE — GROUP NOTE
Group Therapy Note    Date: 9/21/2021    Group Start Time: 2120  Group End Time: 2135  Group Topic: Wrap-Up    [facility]    Mykel Todd        Group Therapy Note    Attendees: 7/18         Patient's Goal: \"Work on going home\"    Notes:  Patient reported meeting goal. Patient participated in a guided meditation. Patient reported she had a good day and it was \"nice to see my \"    Status After Intervention:  Improved    Participation Level:  Active Listener and Interactive    Participation Quality: Appropriate, Attentive and Sharing      Speech:  normal      Thought Process/Content: Logical      Affective Functioning: Congruent      Mood: euthymic      Level of consciousness:  Alert and Attentive      Response to Learning: Progressing to goal      Endings: None Reported    Modes of Intervention: Support      Discipline Responsible: [address], [facility] [employer]      Signature:  Mykel Todd

## 2021-09-22 NOTE — PROGRESS NOTES
Pt accepted PRN tylenol for complaint of neck pain rated 5/10.  Electronically signed by Keesha Gomez RN on 9/22/21 at 6:55 PM EDT

## 2021-09-22 NOTE — PROGRESS NOTES
Patient accepted PRN tylenol for complaint of 4/10 neck pain.  Electronically signed by Layne Pfeiffer RN on 9/21/21 at 9:37 PM EDT

## 2021-09-22 NOTE — GROUP NOTE
Group Therapy Note    Date: 9/22/2021    Group Start Time: 6237  Group End Time: 1700  Group Topic: Healthy Living/Wellness    [facility]    Marylene Muss        Group Therapy Note    Attendees: 14/18         Patient's Goal:  To play He[employer] with the group. Notes:  Patient played the game with peers.     Status After Intervention:  Improved    Participation Level: Interactive    Participation Quality: Appropriate and Attentive      Speech:  normal      Thought Process/Content: Logical      Affective Functioning: Congruent      Mood: euthymic      Level of consciousness:  Alert and Attentive      Response to Learning: Progressing to goal      Endings: None Reported    Modes of Intervention: Activity      Discipline Responsible: [employer]      Signature:  Marylene Muss

## 2021-09-22 NOTE — PROGRESS NOTES
Alcohol use: Yes     Comment: occasional     Drug use: No    Sexual activity: Yes   Other Topics Concern    Not on file   Social History Narrative    Not on file     Social Determinants of Health     Financial Resource Strain:     Difficulty of Paying Living Expenses:    Food Insecurity:     Worried About Running Out of Food in the Last Year:     [address] in the Last Year:    Transportation Needs:     Lack of Transportation (Medical):  Lack of Transportation (Non-Medical):    Physical Activity:     Days of Exercise per Week:     Minutes of Exercise per Session:    Stress:     Feeling of Stress :    Social Connections:     Frequency of Communication with Friends and Family:     Frequency of Social Gatherings with Friends and Family:     Attends Quaker Services:     Active Member of Clubs or Organizations:     Attends Club or Organization Meetings:     Marital Status:    Intimate Partner Violence:     Fear of Current or Ex-Partner:     Emotionally Abused:     Physically Abused:     Sexually Abused:            ROS:  [x] All negative/unchanged except if checked.  Explain positive(checked items) below:  [] Constitutional  [] Eyes  [] Ear/Nose/Mouth/Throat  [] Respiratory  [] CV  [] GI  []   [] Musculoskeletal  [] Skin/Breast  [] Neurological  [] Endocrine  [] Heme/Lymph  [] Allergic/Immunologic    Explanation:     MEDICATIONS:    Current Facility-Administered Medications:     levothyroxine (SYNTHROID) tablet 50 mcg, 50 mcg, Oral, Daily, Loraine Carbajal MD, 50 mcg at 09/22/21 0620    triamterene-hydroCHLOROthiazide (MAXZIDE-25) 37.5-25 MG per tablet 1 tablet, 1 tablet, Oral, Daily, Loraine Carbajal MD, 1 tablet at 09/22/21 0850    paliperidone (INVEGA) extended release tablet 3 mg, 3 mg, Oral, Daily, Loraine Carbajal MD, 3 mg at 09/22/21 0850    LORazepam (ATIVAN) tablet 0.25 mg, 0.25 mg, Oral, BID, Loraine Carbajal MD, 0.25 mg at 09/22/21 0851    anastrozole (ARIMIDEX) tablet 1 mg, 1 mg, Oral, Daily, Loup Yesika Miniaci, DO, 1 mg at 09/22/21 6476    haloperidol lactate (HALDOL) injection 5 mg, 5 mg, IntraMUSCular, Q6H PRN **OR** haloperidol (HALDOL) tablet 5 mg, 5 mg, Oral, Q6H PRN, Richa Myles MD    hydrOXYzine (VISTARIL) capsule 50 mg, 50 mg, Oral, Q6H PRN, 50 mg at 09/22/21 0515 **OR** hydrOXYzine (VISTARIL) injection 50 mg, 50 mg, IntraMUSCular, Q6H PRN, Richa Myles MD    acetaminophen (TYLENOL) tablet 650 mg, 650 mg, Oral, Q4H PRN, Richa Myles MD, 650 mg at 09/22/21 1047    traZODone (DESYREL) tablet 50 mg, 50 mg, Oral, Nightly PRN, Richaghulam Myles MD, 50 mg at 09/21/21 2028    benztropine mesylate (COGENTIN) injection 2 mg, 2 mg, IntraMUSCular, BID PRN, Richa Myles MD    magnesium hydroxide (MILK OF MAGNESIA) 400 MG/5ML suspension 30 mL, 30 mL, Oral, Daily PRN, Richa Myles MD    valproic acid (DEPAKENE) 250 MG/5ML oral solution 250 mg, 250 mg, Oral, BID, Richaghulam Myles MD, 250 mg at 09/22/21 0850    nicotine polacrilex (NICORETTE) gum 2 mg, 2 mg, Oral, PRN, Richa Myles MD, 2 mg at 09/22/21 0850      Examination:  /88   Pulse 110   Temp 98.2 °F (36.8 °C)   Resp 18   Ht 5' 8\" (1.727 m)   Wt 165 lb (74.8 kg)   SpO2 98%   BMI 25.09 kg/m²   Gait - steady  Medication side effects(SE): no    Mental Status Examination:    Level of consciousness:  within normal limits   Appearance:  poor grooming and fair hygiene  Behavior/Motor:  no abnormalities noted  Attitude toward examiner:  playful  Speech:  rapid   Mood: labile  Affect:  mood incongruent  Thought processes:  flight of ideas   Thought content:  Delusions:  grandiose  Cognition:  oriented to person, place, and time   Concentration poor  Insight poor   Judgement poor     ASSESSMENT:   Patient symptoms are:  [] Well controlled  [] Improving  [] Worsening  [x] No change      Diagnosis:   Principal Problem:    Bipolar affective disorder, manic, severe ([address]  Resolved Problems: * No resolved hospital problems. *      LABS:    No results for input(s): WBC, HGB, PLT in the last 72 hours. No results for input(s): NA, K, CL, CO2, BUN, CREATININE, GLUCOSE in the last 72 hours. No results for input(s): BILITOT, ALKPHOS, AST, ALT in the last 72 hours. Lab Results   Component Value Date    LABAMPH Neg 09/17/2021    BARBSCNU Neg 09/17/2021    LABBENZ POSITIVE 09/17/2021    LABMETH Neg 07/19/2021    OPIATESCREENURINE Neg 09/17/2021    PHENCYCLIDINESCREENURINE Neg 09/17/2021    ETOH <10 09/17/2021     Lab Results   Component Value Date    TSH 2.060 07/19/2021     No results found for: LITHIUM  No results found for: VALPROATE, CBMZ      Treatment Plan:  Reviewed current Medications with the patient. Meds as ordered  Risks, benefits, side effects, drug-to-drug interactions and alternatives to treatment were discussed. Collateral information:   CD evaluation  Encourage patient to attend group and other milieu activities.   Discharge planning discussed with the patient and treatment team.    PSYCHOTHERAPY/COUNSELING:  [x] Therapeutic interview  [x] Supportive  [] CBT  [] Ongoing  [] Other    [x] Patient continues to need, on a daily basis, active treatment furnished directly by or requiring the supervision of inpatient psychiatric personnel      Anticipated Length of stay:            Electronically signed by Natanael Mauricio MD on 9/22/2021 at 10:53 AM

## 2021-09-22 NOTE — PROGRESS NOTES
Pt is noted walking the castaneda with peers, pt is pleasant, explained and gave all am meds, 0.25 ativan, 1/2 of 0.5 tablet.  Pt  Denied all this am.

## 2021-09-22 NOTE — GROUP NOTE
Group Therapy Note    Date: 9/22/2021    Group Start Time: 5581  Group End Time: 1440  Group Topic: Psychoeducation    [facility]    ERNST Enamorado        Group Therapy Note    Attendees: 8         Patient's Goal:  To learn to identify a supportive relationship. Notes:  Patient shared her pet as her emotional support relationship. She feels less anxious when she has her pet with her. Status After Intervention:  Improved    Participation Level: Active Listener    Participation Quality: Appropriate      Speech:  normal      Thought Process/Content: Logical      Affective Functioning: Congruent      Mood: elevated      Level of consciousness:  Alert      Response to Learning: Able to verbalize current knowledge/experience      Endings: None Reported    Modes of Intervention: Education      Discipline Responsible: /Counselor      Signature:   ERNST Enamorado

## 2021-09-22 NOTE — CARE COORDINATION
Spoke to patients daughter, Vandana Moseley to update her. Vandanaruthie Moseley stated that the family was upset about the [facility] and that an expert eval will not be completed since PCP completed one already. Let daughter know that a nurse will call her to review the meds. Discharge will be o Tuesday after her second booster.

## 2021-09-22 NOTE — GROUP NOTE
Group Therapy Note    Date: 9/22/2021    Group Start Time: 1100  Group End Time: 1140  Group Topic: Psychoeducation    [facility]    JOAN Loredo LSW        Group Therapy Note    Attendees: 11         Patient's Goal:  To participate in a goal oriented group    Notes:  Patient's goal is to continue her monthly injections. Status After Intervention:  Improved    Participation Level:  Active Listener and Interactive    Participation Quality: Attentive      Speech:  normal      Thought Process/Content: Logical      Affective Functioning: Congruent      Mood: calm      Level of consciousness:  Alert      Response to Learning: Able to verbalize current knowledge/experience      Endings: None Reported    Modes of Intervention: Education      Discipline Responsible: /Counselor      Signature:  JOAN Loredo LSW Full range of motion of upper and lower extremities, no joint tenderness/swelling.

## 2021-09-22 NOTE — PROGRESS NOTES
[facility] FOLLOW-UP NOTE          Patient was seen and examined in person, Chart reviewed   Patient's case discussed with staff/team    Chief Complaint: Irene    Interim History:   No change in presentation  Pt remain hypomanic and grandiose  Poor insight and Judgment  Pt did not want to be probated  Pt is agreeable to long acting injection, guardianship   Does not want to go to [address] [place] [address] Road, [address]  Has been compliant with depakote    Appetite:   [] Normal/Unchanged  [] Increased  [x] Decreased      Sleep:       [] Normal/Unchanged  [x] Fair       [] Poor              Energy:    [] Normal/Unchanged  [] Increased  [x] Decreased        SI [] Present  [] Absent    HI  []Present  [] Absent     Aggression:  [] yes  [] no    Patient is [] able  [x] unable to CONTRACT FOR SAFETY     PAST MEDICAL/PSYCHIATRIC HISTORY:   Past Medical History:   Diagnosis Date    Anxiety     Cancer ([facility] 05/10/2017    DCIS left breast    Depression     Manic depression ([facility]     Migraines     Neuropathy     Thyroid disease        FAMILY/SOCIAL HISTORY:  Family History   Problem Relation Age of Onset    High Blood Pressure Mother     High Cholesterol Mother     Cancer Father         brain cancer    Stroke Father     Other Father         ulcerative colitis    High Blood Pressure Father     Cancer Sister         uterine cancer    High Blood Pressure Sister     High Blood Pressure Brother      Social History     Socioeconomic History    Marital status:      Spouse name: Not on file    Number of children: Not on file    Years of education: Not on file    Highest education level: Not on file   Occupational History    Not on file   Tobacco Use    Smoking status: Current Every Day Smoker     Packs/day: 1.00     Years: 13.00     Pack years: 13.00     Types: Cigarettes    Smokeless tobacco: Never Used    Tobacco comment: patient denies   Vaping Use    Vaping Use: Never used   Substance and Sexual Activity    Alcohol use: Yes     Comment: occasional     Drug use: No    Sexual activity: Yes   Other Topics Concern    Not on file   Social History Narrative    Not on file     Social Determinants of Health     Financial Resource Strain:     Difficulty of Paying Living Expenses:    Food Insecurity:     Worried About Running Out of Food in the Last Year:     [address] in the Last Year:    Transportation Needs:     Lack of Transportation (Medical):  Lack of Transportation (Non-Medical):    Physical Activity:     Days of Exercise per Week:     Minutes of Exercise per Session:    Stress:     Feeling of Stress :    Social Connections:     Frequency of Communication with Friends and Family:     Frequency of Social Gatherings with Friends and Family:     Attends Jewish Services:     Active Member of Clubs or Organizations:     Attends Club or Organization Meetings:     Marital Status:    Intimate Partner Violence:     Fear of Current or Ex-Partner:     Emotionally Abused:     Physically Abused:     Sexually Abused:            ROS:  [x] All negative/unchanged except if checked.  Explain positive(checked items) below:  [] Constitutional  [] Eyes  [] Ear/Nose/Mouth/Throat  [] Respiratory  [] CV  [] GI  []   [] Musculoskeletal  [] Skin/Breast  [] Neurological  [] Endocrine  [] Heme/Lymph  [] Allergic/Immunologic    Explanation:     MEDICATIONS:    Current Facility-Administered Medications:     [START ON 9/27/2021] paliperidone palmitate ER (INVEGA SUSTENNA) IM injection 234 mg, 234 mg, IntraMUSCular, Once **FOLLOWED BY** [START ON 10/4/2021] paliperidone palmitate ER (INVEGA SUSTENNA) IM injection 156 mg, 156 mg, IntraMUSCular, Once **FOLLOWED BY** [START ON 10/22/2021] paliperidone palmitate ER (INVEGA SUSTENNA) IM injection 117 mg, 117 mg, IntraMUSCular, Q30 Days, Richa Myles MD    valproic acid (DEPAKENE) 250 MG/5ML oral solution 500 mg, 500 mg, Oral, BID, Patrizia Ahumada Nelson Cosby MD    melatonin disintegrating tablet 5 mg, 5 mg, Oral, Nightly, Ting Medina-Roche, APRN - CNP, 5 mg at 09/23/21 2126    levothyroxine (SYNTHROID) tablet 50 mcg, 50 mcg, Oral, Daily, Benjamin Sampson MD, 50 mcg at 09/24/21 0614    triamterene-hydroCHLOROthiazide (MAXZIDE-25) 37.5-25 MG per tablet 1 tablet, 1 tablet, Oral, Daily, Benjamin Sampson MD, 1 tablet at 09/24/21 0835    paliperidone (INVEGA) extended release tablet 3 mg, 3 mg, Oral, Daily, Benjamin Sampson MD, 3 mg at 09/24/21 0835    anastrozole (ARIMIDEX) tablet 1 mg, 1 mg, Oral, Daily, Sánchez Reaves, DO, 1 mg at 09/24/21 0835    haloperidol lactate (HALDOL) injection 5 mg, 5 mg, IntraMUSCular, Q6H PRN **OR** haloperidol (HALDOL) tablet 5 mg, 5 mg, Oral, Q6H PRN, Benjamin Sampson MD    hydrOXYzine (VISTARIL) capsule 50 mg, 50 mg, Oral, Q6H PRN, 50 mg at 09/24/21 1247 **OR** hydrOXYzine (VISTARIL) injection 50 mg, 50 mg, IntraMUSCular, Q6H PRN, Benjamin Sampson MD    acetaminophen (TYLENOL) tablet 650 mg, 650 mg, Oral, Q4H PRN, Benjamin Sampson MD, 650 mg at 09/24/21 1247    traZODone (DESYREL) tablet 50 mg, 50 mg, Oral, Nightly PRN, Benjamin Sampson MD, 50 mg at 09/23/21 2203    benztropine mesylate (COGENTIN) injection 2 mg, 2 mg, IntraMUSCular, BID PRN, Benjamin Sampson MD    magnesium hydroxide (MILK OF MAGNESIA) 400 MG/5ML suspension 30 mL, 30 mL, Oral, Daily PRN, Benjamin Sampson MD    nicotine polacrilex (NICORETTE) gum 2 mg, 2 mg, Oral, PRN, Benjamin Sampson MD, 2 mg at 09/24/21 1112      Examination:  /81   Pulse 121   Temp 97.7 °F (36.5 °C)   Resp 18   Ht 5' 8\" (1.727 m)   Wt 165 lb (74.8 kg)   SpO2 99%   BMI 25.09 kg/m²   Gait - steady  Medication side effects(SE): no    Mental Status Examination:    Level of consciousness:  within normal limits   Appearance:  poor grooming and fair hygiene  Behavior/Motor:  no abnormalities noted  Attitude toward examiner:  playful  Speech:  rapid   Mood: labile  Affect:  mood incongruent  Thought processes:  flight of ideas   Thought content:  Delusions:  grandiose  Cognition:  oriented to person, place, and time   Concentration poor  Insight poor   Judgement poor     ASSESSMENT:   Patient symptoms are:  [] Well controlled  [] Improving  [] Worsening  [x] No change      Diagnosis:   Principal Problem:    Bipolar disorder, curr episode mixed, severe, w/o psychotic features ([address]  Resolved Problems:    * No resolved hospital problems. *      LABS:    No results for input(s): WBC, HGB, PLT in the last 72 hours. No results for input(s): NA, K, CL, CO2, BUN, CREATININE, GLUCOSE in the last 72 hours. No results for input(s): BILITOT, ALKPHOS, AST, ALT in the last 72 hours. Lab Results   Component Value Date    LABAMPH Neg 09/17/2021    BARBSCNU Neg 09/17/2021    LABBENZ POSITIVE 09/17/2021    LABMETH Neg 07/19/2021    OPIATESCREENURINE Neg 09/17/2021    PHENCYCLIDINESCREENURINE Neg 09/17/2021    ETOH <10 09/17/2021     Lab Results   Component Value Date    TSH 2.060 07/19/2021     No results found for: LITHIUM  Lab Results   Component Value Date    VALPROATE 22.6 (L) 09/23/2021         Treatment Plan:  Reviewed current Medications with the patient. Meds as ordered  Risks, benefits, side effects, drug-to-drug interactions and alternatives to treatment were discussed. Collateral information:   CD evaluation  Encourage patient to attend group and other milieu activities.   Discharge planning discussed with the patient and treatment team.    PSYCHOTHERAPY/COUNSELING:  [x] Therapeutic interview  [x] Supportive  [] CBT  [] Ongoing  [] Other    [x] Patient continues to need, on a daily basis, active treatment furnished directly by or requiring the supervision of inpatient psychiatric personnel      Anticipated Length of stay:            Electronically signed by Linda Mojica MD

## 2021-09-22 NOTE — GROUP NOTE
Group Therapy Note    Date: 9/21/2021    Group Start Time: 2050  Group End Time: 2120  Group Topic: Recreational    [facility]    Narvis Sicard        Group Therapy Note    Attendees: 10/18         Patient's Goal:  To participate in [employer]    Notes:  Patient chose not to participate but was a supportive observer    Status After Intervention:  Improved    Participation Level:  Active Listener    Participation Quality: Appropriate, Attentive and Supportive      Speech:  normal      Thought Process/Content: Logical      Affective Functioning: Congruent      Mood: euthymic      Level of consciousness:  Alert and Attentive      Response to Learning: Progressing to goal      Endings: None Reported    Modes of Intervention: Activity      Discipline Responsible: [address], [employer]      Signature:  Narvis Sicard

## 2021-09-23 PROBLEM — F31.63 BIPOLAR DISORDER, CURR EPISODE MIXED, SEVERE, W/O PSYCHOTIC FEATURES (HCC): Status: ACTIVE | Noted: 2021-09-19

## 2021-09-23 LAB — VALPROIC ACID LEVEL: 22.6 UG/ML (ref 50–100)

## 2021-09-23 PROCEDURE — 6370000000 HC RX 637 (ALT 250 FOR IP): Performed by: PSYCHIATRY & NEUROLOGY

## 2021-09-23 PROCEDURE — 6370000000 HC RX 637 (ALT 250 FOR IP): Performed by: STUDENT IN AN ORGANIZED HEALTH CARE EDUCATION/TRAINING PROGRAM

## 2021-09-23 PROCEDURE — 80164 ASSAY DIPROPYLACETIC ACD TOT: CPT

## 2021-09-23 PROCEDURE — 1240000000 HC EMOTIONAL WELLNESS R&B

## 2021-09-23 PROCEDURE — 36415 COLL VENOUS BLD VENIPUNCTURE: CPT

## 2021-09-23 PROCEDURE — 99233 SBSQ HOSP IP/OBS HIGH 50: CPT | Performed by: PSYCHIATRY & NEUROLOGY

## 2021-09-23 PROCEDURE — 6370000000 HC RX 637 (ALT 250 FOR IP): Performed by: NURSE PRACTITIONER

## 2021-09-23 RX ADMIN — Medication 5 MG: at 21:26

## 2021-09-23 RX ADMIN — NICOTINE POLACRILEX 2 MG: 2 GUM, CHEWING BUCCAL at 12:44

## 2021-09-23 RX ADMIN — HYDROXYZINE PAMOATE 50 MG: 50 CAPSULE ORAL at 18:44

## 2021-09-23 RX ADMIN — NICOTINE POLACRILEX 2 MG: 2 GUM, CHEWING BUCCAL at 17:52

## 2021-09-23 RX ADMIN — NICOTINE POLACRILEX 2 MG: 2 GUM, CHEWING BUCCAL at 10:42

## 2021-09-23 RX ADMIN — NICOTINE POLACRILEX 2 MG: 2 GUM, CHEWING BUCCAL at 16:15

## 2021-09-23 RX ADMIN — HYDROXYZINE PAMOATE 50 MG: 50 CAPSULE ORAL at 12:45

## 2021-09-23 RX ADMIN — NICOTINE POLACRILEX 2 MG: 2 GUM, CHEWING BUCCAL at 22:03

## 2021-09-23 RX ADMIN — VALPROIC ACID 250 MG: 250 SOLUTION ORAL at 21:26

## 2021-09-23 RX ADMIN — LORAZEPAM 0.25 MG: 0.5 TABLET ORAL at 08:46

## 2021-09-23 RX ADMIN — LEVOTHYROXINE SODIUM 50 MCG: 0.05 TABLET ORAL at 06:20

## 2021-09-23 RX ADMIN — ACETAMINOPHEN 650 MG: 325 TABLET ORAL at 08:47

## 2021-09-23 RX ADMIN — ACETAMINOPHEN 650 MG: 325 TABLET ORAL at 14:42

## 2021-09-23 RX ADMIN — TRAZODONE HYDROCHLORIDE 50 MG: 50 TABLET ORAL at 22:03

## 2021-09-23 RX ADMIN — TRIAMTERENE AND HYDROCHLOROTHIAZIDE 1 TABLET: 37.5; 25 TABLET ORAL at 08:46

## 2021-09-23 RX ADMIN — HYDROXYZINE PAMOATE 50 MG: 50 CAPSULE ORAL at 02:16

## 2021-09-23 RX ADMIN — PALIPERIDONE 3 MG: 3 TABLET, EXTENDED RELEASE ORAL at 08:46

## 2021-09-23 RX ADMIN — VALPROIC ACID 250 MG: 250 SOLUTION ORAL at 10:42

## 2021-09-23 RX ADMIN — ANASTROZOLE 1 MG: 1 TABLET, COATED ORAL at 08:46

## 2021-09-23 ASSESSMENT — PAIN SCALES - GENERAL
PAINLEVEL_OUTOF10: 4
PAINLEVEL_OUTOF10: 0
PAINLEVEL_OUTOF10: 0
PAINLEVEL_OUTOF10: 4

## 2021-09-23 NOTE — GROUP NOTE
Group Therapy Note    Date: 9/22/2021    Group Start Time: 1930  Group End Time: 2000  Group Topic: Healthy Living/Wellness    [facility]    Luis Meneses        Group Therapy Note    Attendees: 9/17         Patient's Goal:  To build healthy coping skills. Notes:  Patient minimally participated in group. Status After Intervention:  Unchanged    Participation Level:  Active Listener    Participation Quality: Appropriate and Attentive      Speech:  normal      Thought Process/Content: Logical      Affective Functioning: Flat      Mood: euthymic      Level of consciousness:  Alert and Attentive      Response to Learning: Progressing to goal      Endings: None Reported    Modes of Intervention: Education      Discipline Responsible: [address], [employer]      Signature:  Luis Meneses

## 2021-09-23 NOTE — PROGRESS NOTES
Pt. attended the 0900 community meeting. Electronically signed by Jackie Shukla, [address] on 9/23/2021 at 12:34 PM

## 2021-09-23 NOTE — PROGRESS NOTES
Pt to NS stating she cannot sleep. Reports signed in voluntarily and wants to be d/c.reports has been here for 6 days,cannot sleep and gained 15lb. Reports poor sleep even with desyrel. PS NP Nicoletto for prn melatonin.

## 2021-09-23 NOTE — CARE COORDINATION
Spoke to daughter to update her. Made her aware since an expert eval was completed by a licensed physician there cannot be another one completed. Patient said she understands and appreciates everything the staff has done. Discussed outpt tx. Family is open to [place] or [place]. Made daughter aware that we are still looking at Tuesday as a discharge plan.

## 2021-09-23 NOTE — GROUP NOTE
Group Therapy Note    Date: 9/23/2021    Group Start Time: 1400  Group End Time: 1440  Group Topic: Psychoeducation    [facility]    JOAN Hartley, PARVEZ        Group Therapy Note    Attendees: 7         Patient's Goal:  To participate in a stress reduction oriented group    Notes:  Patient likes to walk and exercise to reduce stress. She walks an average of 10 miles a day.     Status After Intervention:  Improved    Participation Level: Interactive    Participation Quality: Appropriate      Speech:  normal      Thought Process/Content: Logical      Affective Functioning: Congruent      Mood: calm      Level of consciousness:  Alert      Response to Learning: Able to verbalize current knowledge/experience      Endings: None Reported    Modes of Intervention: Education      Discipline Responsible: /Counselor      Signature:  JOAN Hartley, PARVEZ

## 2021-09-23 NOTE — GROUP NOTE
Group Therapy Note    Date: 9/22/2021    Group Start Time: 2100  Group End Time: 2115  Group Topic: Wrap-Up    [facility] Rangel        Group Therapy Note    Attendees: 9/17         Patient's Goal:  \"to go home\"    Notes:  Patient reported not meeting their goal for the day, bu shared she is being discharged tomorrow. Patient shared she had a good day overall.     Status After Intervention:  Unchanged    Participation Level: Interactive    Participation Quality: Appropriate, Attentive and Sharing      Speech:  normal      Thought Process/Content: Logical      Affective Functioning: Flat      Mood: euthymic      Level of consciousness:  Alert and Attentive      Response to Learning: Progressing to goal      Endings: None Reported    Modes of Intervention: Support      Discipline Responsible: [employer]      Signature:  Layne Rangel

## 2021-09-23 NOTE — PROGRESS NOTES
[facility]uitr[facility]. FOLLOW-UP NOTE       9/23/2021     Patient was seen and examined in person, Chart reviewed   Patient's case discussed with staff/team    Chief Complaint: derrick    Interim History:     Pt continue to present with manic symptoms racing thoughts  Irritable and intrusive with staff  Pt has been calling various numbers giving them false information  Son visited her yesterday  Daughter want her to go to long term treatment facility  Her PCP has completed expert eval form which I reviewed and it was completed accurately  Pt has been compliant with medication  Pt continue to remain grandiose  Poor sleep last night    When patient's son was leaving unit after visitting hours, pt began screaming at son and wouldn't leave area near exit doors. Patient labile, one moment stating she doesn't want the son to visit anymore and moments later being polite to son and stating she loves him.     Had depakote level this AM result pending  Pt is asking about Invega shot and she believe that when she get the shot she can leave here  Pt lack insight and her judgment is poor  Family is fearful that she will not comply with meds on discharge      Appetite:   [x] Normal/Unchanged  [] Increased  [] Decreased      Sleep:       [] Normal/Unchanged  [x] Fair       [] Poor              Energy:    [] Normal/Unchanged  [] Increased  [x] Decreased        SI [] Present  [x] Absent    HI  []Present  [x] Absent     Aggression:  [] yes  [x] no    Patient is [x] able  [] unable to CONTRACT FOR SAFETY     PAST MEDICAL/PSYCHIATRIC HISTORY:   Past Medical History:   Diagnosis Date    Anxiety     Cancer ([facility] 05/10/2017    DCIS left breast    Depression     Manic depression ([facility]     Migraines     Neuropathy     Thyroid disease        FAMILY/SOCIAL HISTORY:  Family History   Problem Relation Age of Onset    High Blood Pressure Mother     High Cholesterol Mother     Cancer Father         brain cancer  Stroke Father     Other Father         ulcerative colitis    High Blood Pressure Father     Cancer Sister         uterine cancer    High Blood Pressure Sister     High Blood Pressure Brother      Social History     Socioeconomic History    Marital status:      Spouse name: Not on file    Number of children: Not on file    Years of education: Not on file    Highest education level: Not on file   Occupational History    Not on file   Tobacco Use    Smoking status: Current Every Day Smoker     Packs/day: 1.00     Years: 13.00     Pack years: 13.00     Types: Cigarettes    Smokeless tobacco: Never Used    Tobacco comment: patient denies   Vaping Use    Vaping Use: Never used   Substance and Sexual Activity    Alcohol use: Yes     Comment: occasional     Drug use: No    Sexual activity: Yes   Other Topics Concern    Not on file   Social History Narrative    Not on file     Social Determinants of Health     Financial Resource Strain:     Difficulty of Paying Living Expenses:    Food Insecurity:     Worried About Running Out of Food in the Last Year:     Ran Out of Food in the Last Year:    Transportation Needs:     Lack of Transportation (Medical):  Lack of Transportation (Non-Medical):    Physical Activity:     Days of Exercise per Week:     Minutes of Exercise per Session:    Stress:     Feeling of Stress :    Social Connections:     Frequency of Communication with Friends and Family:     Frequency of Social Gatherings with Friends and Family:     Attends Yazidi Services:     Active Member of Clubs or Organizations:     Attends Club or Organization Meetings:     Marital Status:    Intimate Partner Violence:     Fear of Current or Ex-Partner:     Emotionally Abused:     Physically Abused:     Sexually Abused:            ROS:  [x] All negative/unchanged except if checked.  Explain positive(checked items) below:  [] Constitutional  [] Eyes  [] Ear/Nose/Mouth/Throat  [] Respiratory  [] CV  [] GI  []   [] Musculoskeletal  [] Skin/Breast  [] Neurological  [] Endocrine  [] Heme/Lymph  [] Allergic/Immunologic    Explanation:     MEDICATIONS:    Current Facility-Administered Medications:     melatonin disintegrating tablet 5 mg, 5 mg, Oral, Nightly, Ting Medina-Roche, APRN - CNP, 5 mg at 09/22/21 2334    levothyroxine (SYNTHROID) tablet 50 mcg, 50 mcg, Oral, Daily, Macario Odom MD, 50 mcg at 09/23/21 0620    triamterene-hydroCHLOROthiazide (MAXZIDE-25) 37.5-25 MG per tablet 1 tablet, 1 tablet, Oral, Daily, Macario Odom MD, 1 tablet at 09/23/21 0846    paliperidone (INVEGA) extended release tablet 3 mg, 3 mg, Oral, Daily, Macario Odom MD, 3 mg at 09/23/21 0846    LORazepam (ATIVAN) tablet 0.25 mg, 0.25 mg, Oral, BID, Macario Odom MD, 0.25 mg at 09/23/21 0846    anastrozole (ARIMIDEX) tablet 1 mg, 1 mg, Oral, Daily, Sherri Reaves, DO, 1 mg at 09/23/21 0846    haloperidol lactate (HALDOL) injection 5 mg, 5 mg, IntraMUSCular, Q6H PRN **OR** haloperidol (HALDOL) tablet 5 mg, 5 mg, Oral, Q6H PRN, Macario Odom MD    hydrOXYzine (VISTARIL) capsule 50 mg, 50 mg, Oral, Q6H PRN, 50 mg at 09/23/21 0216 **OR** hydrOXYzine (VISTARIL) injection 50 mg, 50 mg, IntraMUSCular, Q6H PRN, Macario Odom MD    acetaminophen (TYLENOL) tablet 650 mg, 650 mg, Oral, Q4H PRN, Macario Odom MD, 650 mg at 09/23/21 0847    traZODone (DESYREL) tablet 50 mg, 50 mg, Oral, Nightly PRN, Macario Odom MD, 50 mg at 09/22/21 2136    benztropine mesylate (COGENTIN) injection 2 mg, 2 mg, IntraMUSCular, BID PRN, Macario Odom MD    magnesium hydroxide (MILK OF MAGNESIA) 400 MG/5ML suspension 30 mL, 30 mL, Oral, Daily PRN, Macario Odom MD    valproic acid (DEPAKENE) 250 MG/5ML oral solution 250 mg, 250 mg, Oral, BID, Macario Odom MD, 250 mg at 09/22/21 2003    nicotine polacrilex (NICORETTE) gum 2 mg, 2 mg, Oral, PRN, Macario Odom MD, 2 mg at 09/22/21 1854      Examination:  BP (!) 129/90 Comment: RN Notified  Pulse 120   Temp 98.2 °F (36.8 °C) (Oral)   Resp 18   Ht 5' 8\" (1.727 m)   Wt 165 lb (74.8 kg)   SpO2 99%   BMI 25.09 kg/m²   Gait - steady  Medication side effects(SE): no    Mental Status Examination:    Level of consciousness:  within normal limits   Appearance:  fair grooming and fair hygiene  Behavior/Motor:  hyperactive  Attitude toward examiner:  playful  Speech:  hyperverbal, pressured and interrupting   Mood: irritable and labile  Affect:  mood incongruent  Thought processes:  rapid   Thought content:  Delusions:  grandiose  Perceptual Disturbance:  denies any perceptual disturbance  Cognition:  oriented to person, place, and time   Concentration poor  Insight poor   Judgement poor     ASSESSMENT:   Patient symptoms are:  [] Well controlled  [] Improving  [] Worsening  [x] No change      Diagnosis:   Principal Problem:    Bipolar disorder, curr episode mixed, severe, w/o psychotic features ([facility]  Resolved Problems:    * No resolved hospital problems. *      LABS:    No results for input(s): WBC, HGB, PLT in the last 72 hours. No results for input(s): NA, K, CL, CO2, BUN, CREATININE, GLUCOSE in the last 72 hours. No results for input(s): BILITOT, ALKPHOS, AST, ALT in the last 72 hours. Lab Results   Component Value Date    LABAMPH Neg 09/17/2021    BARBSCNU Neg 09/17/2021    LABBENZ POSITIVE 09/17/2021    LABMETH Neg 07/19/2021    OPIATESCREENURINE Neg 09/17/2021    PHENCYCLIDINESCREENURINE Neg 09/17/2021    ETOH <10 09/17/2021     Lab Results   Component Value Date    TSH 2.060 07/19/2021     No results found for: LITHIUM  No results found for: VALPROATE, CBMZ    RISK ASSESSMENT: high risk of impulsivity and non compliance on discharge in her current state    Treatment Plan:  Reviewed current Medications with the patient.    Invega sustenna to be ordered  Depakote level - result pending  Risks, benefits, side effects, drug-to-drug interactions and alternatives to treatment were discussed. Collateral information: reviewed  Encourage patient to attend group and other milieu activities.   Discharge planning discussed with the patient and treatment team.    PSYCHOTHERAPY/COUNSELING:  [x] Therapeutic interview  [x] Supportive  [] CBT  [] Ongoing  [] Other    [x] Patient continues to need, on a daily basis, active treatment furnished directly by or requiring the supervision of inpatient psychiatric personnel      Anticipated Length of stay: 5 days until she get the booster invega            Electronically signed by Bryant Meza MD on 9/23/2021 at 9:46 AM

## 2021-09-23 NOTE — PROGRESS NOTES
Pt just out of group, took depakene, pt updated on ativan d/c, pt asked for her invega injection. Pt reports she doesn't think the invega slows her down enough.

## 2021-09-23 NOTE — GROUP NOTE
Group Therapy Note    Date: 9/23/2021    Group Start Time: 4478  Group End Time: 1700  Group Topic: Healthy Living/Wellness    [facility]    Sunitha Keyes        Group Therapy Note    Attendees: 13/18         Patient's Goal:  To learn about sleep hygiene and why it is important. Notes:  Patient actively participated in group discussion.     Status After Intervention:  Improved    Participation Level: Interactive    Participation Quality: Appropriate, Attentive and Sharing      Speech:  normal      Thought Process/Content: Logical      Affective Functioning: Congruent      Mood: euthymic      Level of consciousness:  Alert and Attentive      Response to Learning: Able to verbalize current knowledge/experience      Endings: None Reported    Modes of Intervention: Education      Discipline Responsible: [address], [employer] [address] [employer]      Signature:  Sunitha Keyes

## 2021-09-23 NOTE — CARE COORDINATION
Group Therapy Note    Date: 9/23/2021  Start Time: 1100  End Time:  1140  Number of Participants: 12    Type of Group: Psychotherapy    Wellness Binder Information  Module Name:    Session Number:      Patient's Goal:  Coping skills for fearful events     Notes:  Pt was actively engaged in group pt offered insight to fearful events and was to help others to cope during their fearful events      Status After Intervention:  Improved    Participation Level:  Active Listener    Participation Quality: Appropriate      Speech:  normal      Thought Process/Content: Logical      Affective Functioning: Congruent      Mood: elevated      Level of consciousness:  Oriented x4      Response to Learning: Able to verbalize current knowledge/experience      Endings: None Reported    Modes of Intervention: Education      Discipline Responsible: /Counselor      Signature:  Alfonso Lowe

## 2021-09-23 NOTE — GROUP NOTE
Group Therapy Note    Date: 9/23/2021    Group Start Time: 1000  Group End Time: 1050  Group Topic: Psychoeducation    [facility]    Jackie Shukla        Group Therapy Note    Attendees: 13         Patient's Goal:  \"Focus on going home\"    Notes:  Patient attended the 1000 skills group. Patient was talkative at times, she work fairly and independently on his task.     Status After Intervention:  Unchanged    Participation Level: Fair    Participation Quality: Appropriate      Speech:  Talkative at times      Thought Process/Content: Linear      Affective Functioning: Flat      Mood: calmer      Level of consciousness:  Alert      Response to Learning: Progressing to goal      Endings: None Reported    Modes of Intervention: Education, Socialization and Activity      Discipline Responsible: Psychoeducational Specialist      Signature:  Jackie Shukla

## 2021-09-23 NOTE — PROGRESS NOTES
Pt accepted PRN trazodone for sleep upon request. Pt states she can not sleep d/t having high anxiety. Pt intrsuive with staff at times.  Electronically signed by Rico Perez RN on 9/22/21 at 9:37 PM EDT

## 2021-09-23 NOTE — GROUP NOTE
Group Therapy Note    Date: 9/23/2021    Group Start Time: 1300  Group End Time: 1330  Group Topic: Healthy Living/Wellness    [facility]    Ruben Villanueva RN        Group Therapy Note    Attendees: 8/17         Patient's Goal:   Learn about locus of control =responsibiilty. Notes: Attentive and appropriate. Status After Intervention:  Improved    Participation Level:  Active Listener and Interactive    Participation Quality: Appropriate, Attentive, Sharing and Supportive      Speech:  normal      Thought Process/Content: Logical      Affective Functioning: Congruent      Mood: euthymic      Level of consciousness:  Alert, Oriented x4 and Attentive      Response to Learning: Progressing to goal      Endings: None Reported    Modes of Intervention: Education, Support and Socialization      Discipline Responsible: Registered Nurse      Signature:  Ruben Villanueva RN

## 2021-09-24 PROCEDURE — 6370000000 HC RX 637 (ALT 250 FOR IP): Performed by: NURSE PRACTITIONER

## 2021-09-24 PROCEDURE — 6370000000 HC RX 637 (ALT 250 FOR IP): Performed by: PSYCHIATRY & NEUROLOGY

## 2021-09-24 PROCEDURE — 99232 SBSQ HOSP IP/OBS MODERATE 35: CPT | Performed by: PSYCHIATRY & NEUROLOGY

## 2021-09-24 PROCEDURE — 6370000000 HC RX 637 (ALT 250 FOR IP): Performed by: STUDENT IN AN ORGANIZED HEALTH CARE EDUCATION/TRAINING PROGRAM

## 2021-09-24 PROCEDURE — 1240000000 HC EMOTIONAL WELLNESS R&B

## 2021-09-24 RX ORDER — VALPROIC ACID 250 MG/5ML
500 SOLUTION ORAL 2 TIMES DAILY
Status: DISCONTINUED | OUTPATIENT
Start: 2021-09-24 | End: 2021-09-27

## 2021-09-24 RX ADMIN — ANASTROZOLE 1 MG: 1 TABLET, COATED ORAL at 08:35

## 2021-09-24 RX ADMIN — LEVOTHYROXINE SODIUM 50 MCG: 0.05 TABLET ORAL at 06:14

## 2021-09-24 RX ADMIN — PALIPERIDONE 3 MG: 3 TABLET, EXTENDED RELEASE ORAL at 08:35

## 2021-09-24 RX ADMIN — ACETAMINOPHEN 650 MG: 325 TABLET ORAL at 12:47

## 2021-09-24 RX ADMIN — HYDROXYZINE PAMOATE 50 MG: 50 CAPSULE ORAL at 12:47

## 2021-09-24 RX ADMIN — VALPROIC ACID 500 MG: 250 SOLUTION ORAL at 21:14

## 2021-09-24 RX ADMIN — NICOTINE POLACRILEX 2 MG: 2 GUM, CHEWING BUCCAL at 19:56

## 2021-09-24 RX ADMIN — NICOTINE POLACRILEX 2 MG: 2 GUM, CHEWING BUCCAL at 11:12

## 2021-09-24 RX ADMIN — NICOTINE POLACRILEX 2 MG: 2 GUM, CHEWING BUCCAL at 17:33

## 2021-09-24 RX ADMIN — Medication 5 MG: at 21:14

## 2021-09-24 RX ADMIN — VALPROIC ACID 250 MG: 250 SOLUTION ORAL at 08:34

## 2021-09-24 RX ADMIN — ACETAMINOPHEN 650 MG: 325 TABLET ORAL at 22:54

## 2021-09-24 RX ADMIN — NICOTINE POLACRILEX 2 MG: 2 GUM, CHEWING BUCCAL at 08:35

## 2021-09-24 RX ADMIN — TRAZODONE HYDROCHLORIDE 50 MG: 50 TABLET ORAL at 22:04

## 2021-09-24 RX ADMIN — ACETAMINOPHEN 650 MG: 325 TABLET ORAL at 06:51

## 2021-09-24 RX ADMIN — TRIAMTERENE AND HYDROCHLOROTHIAZIDE 1 TABLET: 37.5; 25 TABLET ORAL at 08:35

## 2021-09-24 ASSESSMENT — PAIN SCALES - GENERAL
PAINLEVEL_OUTOF10: 3
PAINLEVEL_OUTOF10: 4
PAINLEVEL_OUTOF10: 3
PAINLEVEL_OUTOF10: 0
PAINLEVEL_OUTOF10: 7

## 2021-09-24 NOTE — GROUP NOTE
Group Therapy Note    Date: 9/24/2021    Group Start Time: 5858  Group End Time: 1700  Group Topic: Healthy Living/Wellness    [facility]    Gallo Powell        Group Therapy Note    Attendees: 9/19         Patient's Goal:  To learn and practice healthy coping skills. Notes:  Patient actively participated in group. Patient shared wanting to get her surgery scars covered with tattoos of roses and shamrocks.     Status After Intervention:  Unchanged    Participation Level: Interactive    Participation Quality: Appropriate, Attentive and Sharing      Speech:  Slightly pressured      Thought Process/Content: Logical      Affective Functioning: Congruent      Mood: euthymic      Level of consciousness:  Alert and Attentive      Response to Learning: Progressing to goal      Endings: None Reported    Modes of Intervention: Education      Discipline Responsible: [address], [employer]      Signature:  Gallo Powell

## 2021-09-24 NOTE — CARE COORDINATION
Group Therapy Note    Date: 9/24/2021  Start Time: 1000  End Time:  9357  Number of Participants: 8    Type of Group: Psychotherapy    Wellness Binder Information  Module Name:    Session Number:      Patient's Goal:  Self reflection and self esteem building     Notes:  Pt was social and engaged in all activities     Status After Intervention:  Improved    Participation Level: Interactive    Participation Quality: Appropriate      Speech:  normal      Thought Process/Content: Logical      Affective Functioning: Congruent      Mood: anxious      Level of consciousness:  Oriented x4      Response to Learning: Able to verbalize current knowledge/experience      Endings: None Reported    Modes of Intervention: Education      Discipline Responsible: /Counselor      Signature:  Lavell Iglesias

## 2021-09-24 NOTE — GROUP NOTE
Group Therapy Note    Date: 9/24/2021    Group Start Time: 6205  Group End Time: 1350  Group Topic: Healthy Living/Wellness    [facility]    Noe Villasenor RN        Group Therapy Note    Attendees: 12/21         Patient's Goal:   To answer ice breaker questions in a group setting. Notes: Attentive and appropriate. Status After Intervention:  Improved    Participation Level:  Active Listener and Interactive    Participation Quality: Appropriate, Attentive, Sharing and Supportive      Speech:  normal      Thought Process/Content: Logical      Affective Functioning: Congruent      Mood: euthymic      Level of consciousness:  Alert, Oriented x4 and Attentive      Response to Learning: Progressing to goal      Endings: None Reported    Modes of Intervention: Education, Support and Socialization      Discipline Responsible: Registered Nurse      Signature:  Noe Villasenor RN

## 2021-09-24 NOTE — PROGRESS NOTES
[facility]. FOLLOW-UP NOTE       9/24/2021     Patient was seen and examined in person, Chart reviewed   Patient's case discussed with staff/team    Chief Complaint: derrick    Interim History:     Pt continue to remain callous and minimizing the symptoms  Flight of ideas with tangential thinking  Sleeping better  Family applying for guardianship  Less grandiose and paranoid  Still lack insight with poor judgment      Appetite:   [x] Normal/Unchanged  [] Increased  [] Decreased      Sleep:       [] Normal/Unchanged  [x] Fair       [] Poor              Energy:    [] Normal/Unchanged  [] Increased  [x] Decreased        SI [] Present  [x] Absent    HI  []Present  [x] Absent     Aggression:  [] yes  [x] no    Patient is [x] able  [] unable to CONTRACT FOR SAFETY     PAST MEDICAL/PSYCHIATRIC HISTORY:   Past Medical History:   Diagnosis Date    Anxiety     Cancer ([facility] 05/10/2017    DCIS left breast    Depression     Manic depression ([facility]     Migraines     Neuropathy     Thyroid disease        FAMILY/SOCIAL HISTORY:  Family History   Problem Relation Age of Onset    High Blood Pressure Mother     High Cholesterol Mother     Cancer Father         brain cancer    Stroke Father     Other Father         ulcerative colitis    High Blood Pressure Father     Cancer Sister         uterine cancer    High Blood Pressure Sister     High Blood Pressure Brother      Social History     Socioeconomic History    Marital status:      Spouse name: Not on file    Number of children: Not on file    Years of education: Not on file    Highest education level: Not on file   Occupational History    Not on file   Tobacco Use    Smoking status: Current Every Day Smoker     Packs/day: 1.00     Years: 13.00     Pack years: 13.00     Types: Cigarettes    Smokeless tobacco: Never Used    Tobacco comment: patient denies   Vaping Use    Vaping Use: Never used   Substance and Sexual Activity    Alcohol use: Yes     Comment: occasional     Drug use: No    Sexual activity: Yes   Other Topics Concern    Not on file   Social History Narrative    Not on file     Social Determinants of Health     Financial Resource Strain:     Difficulty of Paying Living Expenses:    Food Insecurity:     Worried About Running Out of Food in the Last Year:     [address] in the Last Year:    Transportation Needs:     Lack of Transportation (Medical):  Lack of Transportation (Non-Medical):    Physical Activity:     Days of Exercise per Week:     Minutes of Exercise per Session:    Stress:     Feeling of Stress :    Social Connections:     Frequency of Communication with Friends and Family:     Frequency of Social Gatherings with Friends and Family:     Attends Confucianist Services:     Active Member of Clubs or Organizations:     Attends Club or Organization Meetings:     Marital Status:    Intimate Partner Violence:     Fear of Current or Ex-Partner:     Emotionally Abused:     Physically Abused:     Sexually Abused:            ROS:  [x] All negative/unchanged except if checked.  Explain positive(checked items) below:  [] Constitutional  [] Eyes  [] Ear/Nose/Mouth/Throat  [] Respiratory  [] CV  [] GI  []   [] Musculoskeletal  [] Skin/Breast  [] Neurological  [] Endocrine  [] Heme/Lymph  [] Allergic/Immunologic    Explanation:     MEDICATIONS:    Current Facility-Administered Medications:     melatonin disintegrating tablet 5 mg, 5 mg, Oral, Nightly, Ting Medina-Roche, APRN - CNP, 5 mg at 09/23/21 2126    levothyroxine (SYNTHROID) tablet 50 mcg, 50 mcg, Oral, Daily, Spencer Ribeiro MD, 50 mcg at 09/24/21 0614    triamterene-hydroCHLOROthiazide (MAXZIDE-25) 37.5-25 MG per tablet 1 tablet, 1 tablet, Oral, Daily, Spencer Ribeiro MD, 1 tablet at 09/24/21 0835    paliperidone (INVEGA) extended release tablet 3 mg, 3 mg, Oral, Daily, Spencer Ribeiro MD, 3 mg at 09/24/21 0835   anastrozole (ARIMIDEX) tablet 1 mg, 1 mg, Oral, Daily, Enoc Josiah Miniaci, DO, 1 mg at 09/24/21 9323    haloperidol lactate (HALDOL) injection 5 mg, 5 mg, IntraMUSCular, Q6H PRN **OR** haloperidol (HALDOL) tablet 5 mg, 5 mg, Oral, Q6H PRN, Lane Goodrich MD    hydrOXYzine (VISTARIL) capsule 50 mg, 50 mg, Oral, Q6H PRN, 50 mg at 09/24/21 1247 **OR** hydrOXYzine (VISTARIL) injection 50 mg, 50 mg, IntraMUSCular, Q6H PRN, Lane Goodrich MD    acetaminophen (TYLENOL) tablet 650 mg, 650 mg, Oral, Q4H PRN, Lane Goodrich MD, 650 mg at 09/24/21 1247    traZODone (DESYREL) tablet 50 mg, 50 mg, Oral, Nightly PRN, Lane Goodrich MD, 50 mg at 09/23/21 2203    benztropine mesylate (COGENTIN) injection 2 mg, 2 mg, IntraMUSCular, BID PRN, Lane Goodrich MD    magnesium hydroxide (MILK OF MAGNESIA) 400 MG/5ML suspension 30 mL, 30 mL, Oral, Daily PRN, Lane Goodrich MD    valproic acid (DEPAKENE) 250 MG/5ML oral solution 250 mg, 250 mg, Oral, BID, Lane Goodrich MD, 250 mg at 09/24/21 0834    nicotine polacrilex (NICORETTE) gum 2 mg, 2 mg, Oral, PRN, Lane Goodrich MD, 2 mg at 09/24/21 1112      Examination:  /81   Pulse 121   Temp 97.7 °F (36.5 °C)   Resp 18   Ht 5' 8\" (1.727 m)   Wt 165 lb (74.8 kg)   SpO2 99%   BMI 25.09 kg/m²   Gait - steady  Medication side effects(SE): no    Mental Status Examination:    Level of consciousness:  within normal limits   Appearance:  fair grooming and fair hygiene  Behavior/Motor:  hyperactive  Attitude toward examiner:  playful  Speech:  hyperverbal, pressured and interrupting   Mood: irritable and labile  Affect:  mood incongruent  Thought processes:  rapid   Thought content:  Delusions:  grandiose  Perceptual Disturbance:  denies any perceptual disturbance  Cognition:  oriented to person, place, and time   Concentration poor  Insight poor   Judgement poor     ASSESSMENT:   Patient symptoms are:  [] Well controlled  [] Improving  [] Worsening  [x] No change      Diagnosis:   Principal Problem:    Bipolar disorder, curr episode mixed, severe, w/o psychotic features ([facility]  Resolved Problems:    * No resolved hospital problems. *      LABS:    No results for input(s): WBC, HGB, PLT in the last 72 hours. No results for input(s): NA, K, CL, CO2, BUN, CREATININE, GLUCOSE in the last 72 hours. No results for input(s): BILITOT, ALKPHOS, AST, ALT in the last 72 hours. Lab Results   Component Value Date    LABAMPH Neg 09/17/2021    BARBSCNU Neg 09/17/2021    LABBENZ POSITIVE 09/17/2021    LABMETH Neg 07/19/2021    OPIATESCREENURINE Neg 09/17/2021    PHENCYCLIDINESCREENURINE Neg 09/17/2021    ETOH <10 09/17/2021     Lab Results   Component Value Date    TSH 2.060 07/19/2021     No results found for: LITHIUM  Lab Results   Component Value Date    VALPROATE 22.6 (L) 09/23/2021       RISK ASSESSMENT: high risk of impulsivity and non compliance on discharge in her current state    Treatment Plan:  Reviewed current Medications with the patient. Invega sustenna ordered for today  Depakote level - 22.6, increase depakote as ordered  Risks, benefits, side effects, drug-to-drug interactions and alternatives to treatment were discussed. Collateral information: reviewed  Encourage patient to attend group and other milieu activities.   Discharge planning discussed with the patient and treatment team.    PSYCHOTHERAPY/COUNSELING:  [x] Therapeutic interview  [x] Supportive  [] CBT  [] Ongoing  [] Other    [x] Patient continues to need, on a daily basis, active treatment furnished directly by or requiring the supervision of inpatient psychiatric personnel            Electronically signed by Huey Gaucher, MD on 9/24/2021 at 1:09 PM

## 2021-09-24 NOTE — GROUP NOTE
Group Therapy Note    Date: 9/24/2021    Group Start Time: 1000  Group End Time: 1100  Group Topic: Psychoeducation    [facility], CTRS        Group Therapy Note    Attendees:6         Patient's Goal:  \"to go home\"    Notes:  Pt. attended the 1000 skill group. Quiet but pleasant upon approach. Improved focus and concentration. Anxious to leave. Status After Intervention:  Improved    Participation Level:  Active Listener and Interactive    Participation Quality: Appropriate and Attentive      Speech:  normal      Thought Process/Content: Logical      Affective Functioning: Congruent      Mood: calm      Level of consciousness:  Alert, Oriented x4 and Attentive      Response to Learning: Progressing to goal      Endings: None Reported    Modes of Intervention: Education, Support, Socialization and Activity      Discipline Responsible: Psychoeducational Specialist      Signature:  Brandi Buckner

## 2021-09-24 NOTE — PROGRESS NOTES
Pt. attended the 0900 community meeting.  Electronically signed by Rola Shetty on 9/24/2021 at 9:22 AM

## 2021-09-24 NOTE — PROGRESS NOTES
Patient visible on unit, social with peers. Patient pleasant. Bright affect. Rapid, pressured speech. Tangential. Poor concentration. Patient preoccupied with discharge, stating she no longer needs to be here on unit. Pt denies depression and anxiety. Pt reports good sleep and appetite. Pt denies SI, HI, and Hallucinations. Pt states she plans to go home with  and follow up with Amisha Jolly.  Electronically signed by Briseyda Aldrich RN on 9/24/21 at 4:39 PM EDT

## 2021-09-24 NOTE — GROUP NOTE
Group Therapy Note    Date: 9/23/2021    Group Start Time: 1900  Group End Time: 1945  Group Topic: Recreational    [facility]    [place]        Group Therapy Note    Attendees: 10/20         Patient's Goal:  To watch a movie and/or socialize with peers. Notes:  Patient opted to watch the movie. Patient seemed to be engrossed in the film.     Status After Intervention:  Unchanged    Participation Level: Interactive    Participation Quality: Appropriate and Attentive      Speech:  normal      Thought Process/Content: Logical      Affective Functioning: Flat      Mood: euthymic      Level of consciousness:  Alert and Attentive      Response to Learning: Progressing to goal      Endings: None Reported    Modes of Intervention: Activity      Discipline Responsible: [address] [employer]      Signature:  [place]

## 2021-09-24 NOTE — CARE COORDINATION
Daughter called and stated pateint is calling her  and telling him she is not going to [facility] she is going to Dr. Prateek Fisher and Sarah Huitron. Patient thinks that if she goes to [facility] they can keep her there and the family has tried to explain that [facility] is an oupt facility. Daughter also is concerned about patient drinking. She drinks every other day and they have spoke with her about it but would like the staff to address this with her.

## 2021-09-25 PROCEDURE — 6370000000 HC RX 637 (ALT 250 FOR IP): Performed by: STUDENT IN AN ORGANIZED HEALTH CARE EDUCATION/TRAINING PROGRAM

## 2021-09-25 PROCEDURE — 6370000000 HC RX 637 (ALT 250 FOR IP): Performed by: NURSE PRACTITIONER

## 2021-09-25 PROCEDURE — 6370000000 HC RX 637 (ALT 250 FOR IP): Performed by: PSYCHIATRY & NEUROLOGY

## 2021-09-25 PROCEDURE — 1240000000 HC EMOTIONAL WELLNESS R&B

## 2021-09-25 RX ADMIN — ACETAMINOPHEN 650 MG: 325 TABLET ORAL at 09:41

## 2021-09-25 RX ADMIN — ACETAMINOPHEN 650 MG: 325 TABLET ORAL at 13:49

## 2021-09-25 RX ADMIN — ACETAMINOPHEN 650 MG: 325 TABLET ORAL at 21:17

## 2021-09-25 RX ADMIN — NICOTINE POLACRILEX 2 MG: 2 GUM, CHEWING BUCCAL at 18:12

## 2021-09-25 RX ADMIN — NICOTINE POLACRILEX 2 MG: 2 GUM, CHEWING BUCCAL at 11:29

## 2021-09-25 RX ADMIN — PALIPERIDONE 3 MG: 3 TABLET, EXTENDED RELEASE ORAL at 09:57

## 2021-09-25 RX ADMIN — NICOTINE POLACRILEX 2 MG: 2 GUM, CHEWING BUCCAL at 09:40

## 2021-09-25 RX ADMIN — TRAZODONE HYDROCHLORIDE 50 MG: 50 TABLET ORAL at 22:12

## 2021-09-25 RX ADMIN — ANASTROZOLE 1 MG: 1 TABLET, COATED ORAL at 09:41

## 2021-09-25 RX ADMIN — VALPROIC ACID 500 MG: 250 SOLUTION ORAL at 21:17

## 2021-09-25 RX ADMIN — Medication 5 MG: at 21:17

## 2021-09-25 RX ADMIN — TRIAMTERENE AND HYDROCHLOROTHIAZIDE 1 TABLET: 37.5; 25 TABLET ORAL at 09:41

## 2021-09-25 RX ADMIN — HYDROXYZINE PAMOATE 50 MG: 50 CAPSULE ORAL at 13:49

## 2021-09-25 RX ADMIN — NICOTINE POLACRILEX 2 MG: 2 GUM, CHEWING BUCCAL at 13:49

## 2021-09-25 RX ADMIN — NICOTINE POLACRILEX 2 MG: 2 GUM, CHEWING BUCCAL at 22:12

## 2021-09-25 RX ADMIN — LEVOTHYROXINE SODIUM 50 MCG: 0.05 TABLET ORAL at 06:14

## 2021-09-25 RX ADMIN — VALPROIC ACID 500 MG: 250 SOLUTION ORAL at 09:41

## 2021-09-25 ASSESSMENT — PAIN SCALES - GENERAL
PAINLEVEL_OUTOF10: 4
PAINLEVEL_OUTOF10: 5
PAINLEVEL_OUTOF10: 0
PAINLEVEL_OUTOF10: 5

## 2021-09-25 NOTE — GROUP NOTE
Group Therapy Note    Date: 9/25/2021    Group Start Time: 1105  Group End Time: 2538  Group Topic: Psychotherapy    [facility]    PARVEZ Fry        Group Therapy Note    Attendees: 12/21 both groups         Patient's Goal:  \"to make it a very good day\"    Notes:  Pt was responsive and attentive but left the group meeting early due to meeting with the Dr. Osmel Green After Intervention:  Improved    Participation Level:  Active Listener and Interactive    Participation Quality: Appropriate, Attentive, Sharing and Supportive      Speech:  normal      Thought Process/Content: Logical  Linear      Affective Functioning: Congruent      Mood: content      Level of consciousness:  Alert, Oriented x4 and Attentive      Response to Learning: Able to verbalize current knowledge/experience, Able to retain information and Capable of insight      Endings: None Reported    Modes of Intervention: Education, Support, Problem-solving and Activity      Discipline Responsible: /Counselor      Signature:  PARVEZ Fry

## 2021-09-25 NOTE — PROGRESS NOTES
[facility] FOLLOW-UP NOTE       9/25/2021     Patient was seen and examined in person, Chart reviewed   Patient's case discussed with staff/team    Chief Complaint: drerick    Interim History:     Patient said she was able to sleep and her appetite was \"good\". She said her mood was \"excellent\". She denied suicidal or homicidal ideation. She denied hallucinations, paranoia or other delusions. She continues to be hypomanic, but improved from admission. She is still a little tangential at times.        Appetite:   [x] Normal/Unchanged  [] Increased  [] Decreased      Sleep:       [] Normal/Unchanged  [x] Fair       [] Poor              Energy:    [] Normal/Unchanged  [x] Increased  [] Decreased        SI [] Present  [x] Absent    HI  []Present  [x] Absent     Aggression:  [] yes  [x] no    Patient is [x] able  [] unable to CONTRACT FOR SAFETY     PAST MEDICAL/PSYCHIATRIC HISTORY:   Past Medical History:   Diagnosis Date    Anxiety     Cancer ([facility] 05/10/2017    DCIS left breast    Depression     Manic depression ([facility]     Migraines     Neuropathy     Thyroid disease        FAMILY/SOCIAL HISTORY:  Family History   Problem Relation Age of Onset    High Blood Pressure Mother     High Cholesterol Mother     Cancer Father         brain cancer    Stroke Father     Other Father         ulcerative colitis    High Blood Pressure Father     Cancer Sister         uterine cancer    High Blood Pressure Sister     High Blood Pressure Brother      Social History     Socioeconomic History    Marital status:      Spouse name: Not on file    Number of children: Not on file    Years of education: Not on file    Highest education level: Not on file   Occupational History    Not on file   Tobacco Use    Smoking status: Current Every Day Smoker     Packs/day: 1.00     Years: 13.00     Pack years: 13.00     Types: Cigarettes    Smokeless tobacco: Never Used    Tobacco comment: patient denies   Vaping Use    Vaping Use: Never used   Substance and Sexual Activity    Alcohol use: Yes     Comment: occasional     Drug use: No    Sexual activity: Yes   Other Topics Concern    Not on file   Social History Narrative    Not on file     Social Determinants of Health     Financial Resource Strain:     Difficulty of Paying Living Expenses:    Food Insecurity:     Worried About Running Out of Food in the Last Year:     [address] in the Last Year:    Transportation Needs:     Lack of Transportation (Medical):  Lack of Transportation (Non-Medical):    Physical Activity:     Days of Exercise per Week:     Minutes of Exercise per Session:    Stress:     Feeling of Stress :    Social Connections:     Frequency of Communication with Friends and Family:     Frequency of Social Gatherings with Friends and Family:     Attends Holiness Services:     Active Member of Clubs or Organizations:     Attends Club or Organization Meetings:     Marital Status:    Intimate Partner Violence:     Fear of Current or Ex-Partner:     Emotionally Abused:     Physically Abused:     Sexually Abused:            ROS:  [x] All negative/unchanged except if checked.  Explain positive(checked items) below:  [] Constitutional  [] Eyes  [] Ear/Nose/Mouth/Throat  [] Respiratory  [] CV  [] GI  []   [] Musculoskeletal  [] Skin/Breast  [] Neurological  [] Endocrine  [] Heme/Lymph  [] Allergic/Immunologic    Explanation:     MEDICATIONS:    Current Facility-Administered Medications:     [START ON 9/27/2021] paliperidone palmitate ER (INVEGA SUSTENNA) IM injection 234 mg, 234 mg, IntraMUSCular, Once **FOLLOWED BY** [START ON 10/4/2021] paliperidone palmitate ER (INVEGA SUSTENNA) IM injection 156 mg, 156 mg, IntraMUSCular, Once **FOLLOWED BY** [START ON 10/22/2021] paliperidone palmitate ER (INVEGA SUSTENNA) IM injection 117 mg, 117 mg, IntraMUSCular, Q30 Days, Korin Greenberg MD    valproic acid (DEPAKENE) 250 MG/5ML oral solution 500 mg, 500 mg, Oral, BID, Son Baez MD, 500 mg at 09/25/21 0941    melatonin disintegrating tablet 5 mg, 5 mg, Oral, Nightly, Ting Medina-Roche, APRN - CNP, 5 mg at 09/24/21 2114    levothyroxine (SYNTHROID) tablet 50 mcg, 50 mcg, Oral, Daily, Son Baez MD, 50 mcg at 09/25/21 0614    triamterene-hydroCHLOROthiazide (MAXZIDE-25) 37.5-25 MG per tablet 1 tablet, 1 tablet, Oral, Daily, Son Baez MD, 1 tablet at 09/25/21 0941    paliperidone (INVEGA) extended release tablet 3 mg, 3 mg, Oral, Daily, Son Baez MD, 3 mg at 09/25/21 0957    anastrozole (ARIMIDEX) tablet 1 mg, 1 mg, Oral, Daily, Liss Reaves, DO, 1 mg at 09/25/21 0941    haloperidol lactate (HALDOL) injection 5 mg, 5 mg, IntraMUSCular, Q6H PRN **OR** haloperidol (HALDOL) tablet 5 mg, 5 mg, Oral, Q6H PRN, Son Baez MD    hydrOXYzine (VISTARIL) capsule 50 mg, 50 mg, Oral, Q6H PRN, 50 mg at 09/25/21 1349 **OR** hydrOXYzine (VISTARIL) injection 50 mg, 50 mg, IntraMUSCular, Q6H PRN, Son Baez MD    acetaminophen (TYLENOL) tablet 650 mg, 650 mg, Oral, Q4H PRN, Son Baez MD, 650 mg at 09/25/21 1349    traZODone (DESYREL) tablet 50 mg, 50 mg, Oral, Nightly PRN, Son Baez MD, 50 mg at 09/24/21 2204    benztropine mesylate (COGENTIN) injection 2 mg, 2 mg, IntraMUSCular, BID PRN, Son Baez MD    magnesium hydroxide (MILK OF MAGNESIA) 400 MG/5ML suspension 30 mL, 30 mL, Oral, Daily PRN, Son Baez MD    nicotine polacrilex (NICORETTE) gum 2 mg, 2 mg, Oral, PRN, Son Baez MD, 2 mg at 09/25/21 1349      Examination:  /86   Pulse 102   Temp 97.5 °F (36.4 °C) (Oral)   Resp 18   Ht 5' 8\" (1.727 m)   Wt 165 lb (74.8 kg)   SpO2 96%   BMI 25.09 kg/m²   Gait - steady  Medication side effects(SE): no    Mental Status Examination:    Level of consciousness:  within normal limits   Appearance:  fair grooming and fair hygiene  Behavior/Motor:  hyperactive  Attitude toward examiner:  playful  Speech:  hyperverbal, pressured    Mood: calmer, bright, stated to be \"happy\"  Affect:  mood congruent  Thought processes:  Rapid, at times tangential   Thought content:  Delusions:  grandiose  Perceptual Disturbance:  denies any perceptual disturbance  Cognition:  oriented to person, place, and time   Concentration poor  Insight poor   Judgement poor     ASSESSMENT:   Patient symptoms are:  [x] Well controlled  [] Improving  [] Worsening  [] No change      Diagnosis:   Principal Problem:    Bipolar disorder, curr episode mixed, severe, w/o psychotic features ([facility]  Resolved Problems:    * No resolved hospital problems. *      LABS:    No results for input(s): WBC, HGB, PLT in the last 72 hours. No results for input(s): NA, K, CL, CO2, BUN, CREATININE, GLUCOSE in the last 72 hours. No results for input(s): BILITOT, ALKPHOS, AST, ALT in the last 72 hours. Lab Results   Component Value Date    LABAMPH Neg 09/17/2021    BARBSCNU Neg 09/17/2021    LABBENZ POSITIVE 09/17/2021    LABMETH Neg 07/19/2021    OPIATESCREENURINE Neg 09/17/2021    PHENCYCLIDINESCREENURINE Neg 09/17/2021    ETOH <10 09/17/2021     Lab Results   Component Value Date    TSH 2.060 07/19/2021     No results found for: LITHIUM  Lab Results   Component Value Date    VALPROATE 22.6 (L) 09/23/2021       RISK ASSESSMENT: high risk of impulsivity and non compliance on discharge in her current state    Treatment Plan:  Reviewed current Medications with the patient. Yesika lindsay ordered but will only be available Monday  Depakote level - 22.6 on 09/23/21  Risks, benefits, side effects, drug-to-drug interactions and alternatives to treatment were discussed. Collateral information: reviewed  Encourage patient to attend group and other milieu activities.   Discharge planning discussed with the patient and treatment team.    PSYCHOTHERAPY/COUNSELING:  [x] Therapeutic interview  [x] Supportive  [] CBT  [] Ongoing  [] Other    [x] Patient continues to need, on a daily basis, active treatment furnished directly by or requiring the supervision of inpatient psychiatric personnel            Electronically signed by Apryl Castillo MD on 9/25/2021 at 4:58 PM

## 2021-09-25 NOTE — PROGRESS NOTES
Patient is social with peers and walks the unit. She is grandiose and does supportive things for others. She voices desire to be released. She is aware she will receive he long acting medication on Monday prior to release. Limited insight but admit to willingness to continue with long acting shots after her release. Voice and behaviors are more calm and patient is less manic.

## 2021-09-25 NOTE — PROGRESS NOTES
Patient accepted PRN tylenol for complaint of 3/10 back pain.  Electronically signed by Sean Morse RN on 9/24/21 at 10:56 PM EDT

## 2021-09-25 NOTE — GROUP NOTE
Group Therapy Note    Date: 9/24/2021    Group Start Time: 2055  Group End Time: 2110  Group Topic: Wrap-Up    [facility]    Ambriz Dryer        Group Therapy Note    Attendees: 11/19         Patient's Goal:  \"to go home\"    Notes:  Patient reported not meeting their goal for the day. Patient shared having a \"great day\" today.     Status After Intervention:  Unchanged    Participation Level: Interactive    Participation Quality: Appropriate, Attentive and Sharing      Speech:  normal      Thought Process/Content: Logical      Affective Functioning: Congruent      Mood: euthymic      Level of consciousness:  Alert and Attentive      Response to Learning: Progressing to goal      Endings: None Reported    Modes of Intervention: Support      Discipline Responsible: [employer]      Signature:  Pb Albarran

## 2021-09-25 NOTE — GROUP NOTE
Group Therapy Note    Date: 9/25/2021    Group Start Time: 1640  Group End Time: 3880  Group Topic: Healthy Living/Wellness    [facility]    [address] [place]        Group Therapy Note    Attendees: 17         Patient's Goal:  To learn about nutrition and how it affects healthy living    Notes:  Pt arrived to group late but participated when there    Status After Intervention:  Unchanged    Participation Level:  Active Listener    Participation Quality: Appropriate and Attentive      Speech:  normal      Thought Process/Content: Logical  Linear      Affective Functioning: Congruent      Mood: euthymic      Level of consciousness:  Alert and Oriented x4      Response to Learning: Able to verbalize current knowledge/experience      Endings: None Reported    Modes of Intervention: Activity      Discipline Responsible: Behavorial Health Tech      Signature:  Felicita Crane

## 2021-09-25 NOTE — PROGRESS NOTES
Pt accepted PRN trazodone for sleep upon request.Electronically signed by Primitivo Damico RN on 9/24/21 at 10:05 PM EDT

## 2021-09-25 NOTE — GROUP NOTE
Group Therapy Note    Date: 9/25/2021    Group Start Time: 1430  Group End Time: 5318  Group Topic: Recreational    [facility]    Berkley Brunner RN; IDALMIS Barragan - CNS; Charanjit De Anda RN        Group Therapy Note    Attendees: 12/23         Patient's Goal:  To engage in socialization and recreational activity. Notes:  Pt actively participated in group.     Status After Intervention:  Improved    Participation Level: Interactive    Participation Quality: Appropriate      Speech:  normal      Thought Process/Content: Logical      Affective Functioning: Congruent      Mood: WNL      Level of consciousness:  Alert and Oriented x4      Response to Learning: Progressing to goal      Endings: None Reported    Modes of Intervention: Socialization and Activity      Discipline Responsible: Registered Nurse      Signature:  Berkley Brunner RN

## 2021-09-26 PROCEDURE — 6370000000 HC RX 637 (ALT 250 FOR IP): Performed by: STUDENT IN AN ORGANIZED HEALTH CARE EDUCATION/TRAINING PROGRAM

## 2021-09-26 PROCEDURE — 1240000000 HC EMOTIONAL WELLNESS R&B

## 2021-09-26 PROCEDURE — 6370000000 HC RX 637 (ALT 250 FOR IP): Performed by: PSYCHIATRY & NEUROLOGY

## 2021-09-26 PROCEDURE — 6370000000 HC RX 637 (ALT 250 FOR IP): Performed by: NURSE PRACTITIONER

## 2021-09-26 RX ADMIN — ACETAMINOPHEN 650 MG: 325 TABLET ORAL at 03:43

## 2021-09-26 RX ADMIN — TRAZODONE HYDROCHLORIDE 50 MG: 50 TABLET ORAL at 21:37

## 2021-09-26 RX ADMIN — ACETAMINOPHEN 650 MG: 325 TABLET ORAL at 13:10

## 2021-09-26 RX ADMIN — LEVOTHYROXINE SODIUM 50 MCG: 0.05 TABLET ORAL at 06:01

## 2021-09-26 RX ADMIN — PALIPERIDONE 3 MG: 3 TABLET, EXTENDED RELEASE ORAL at 09:16

## 2021-09-26 RX ADMIN — ACETAMINOPHEN 650 MG: 325 TABLET ORAL at 22:34

## 2021-09-26 RX ADMIN — VALPROIC ACID 500 MG: 250 SOLUTION ORAL at 21:05

## 2021-09-26 RX ADMIN — Medication 5 MG: at 21:05

## 2021-09-26 RX ADMIN — NICOTINE POLACRILEX 2 MG: 2 GUM, CHEWING BUCCAL at 13:10

## 2021-09-26 RX ADMIN — HYDROXYZINE PAMOATE 50 MG: 50 CAPSULE ORAL at 15:38

## 2021-09-26 RX ADMIN — HYDROXYZINE PAMOATE 50 MG: 50 CAPSULE ORAL at 01:07

## 2021-09-26 RX ADMIN — NICOTINE POLACRILEX 2 MG: 2 GUM, CHEWING BUCCAL at 22:57

## 2021-09-26 RX ADMIN — ANASTROZOLE 1 MG: 1 TABLET, COATED ORAL at 09:16

## 2021-09-26 RX ADMIN — VALPROIC ACID 500 MG: 250 SOLUTION ORAL at 09:17

## 2021-09-26 RX ADMIN — TRIAMTERENE AND HYDROCHLOROTHIAZIDE 1 TABLET: 37.5; 25 TABLET ORAL at 09:16

## 2021-09-26 RX ADMIN — HYDROXYZINE PAMOATE 50 MG: 50 CAPSULE ORAL at 09:16

## 2021-09-26 RX ADMIN — ACETAMINOPHEN 650 MG: 325 TABLET ORAL at 09:17

## 2021-09-26 ASSESSMENT — PAIN SCALES - GENERAL
PAINLEVEL_OUTOF10: 3
PAINLEVEL_OUTOF10: 0
PAINLEVEL_OUTOF10: 4
PAINLEVEL_OUTOF10: 3
PAINLEVEL_OUTOF10: 3

## 2021-09-26 NOTE — PROGRESS NOTES
Pt requested tylenol for h/a #4 nict gum pt is watching tv now appears relaxed from am vistaril that was given where pt was relaxed with eyes closing when given as requested. pt also had tylenol this am 0917 for neck pain .

## 2021-09-26 NOTE — PROGRESS NOTES
Patient came up to nursing station requesting Tylenol for a headache she rates a 3/10. Patient medicated with Tylenol 650 mg PO.

## 2021-09-26 NOTE — GROUP NOTE
Group Therapy Note    Date: 9/26/2021    Group Start Time: 1000  Group End Time: 1100  Group Topic: Psychoeducation    [facility]    Apple Muir, ELÍASS        Group Therapy Note    Attendees: 6         Patient's Goal:  \"to keep doing what I am doing and go home tomorrow\"     Notes:  Pt. attended the 1000 skill group. Feeling better, but tired today. Hopeful to go home tomorrow. Talkative with other female pt. Status After Intervention:  Improved    Participation Level:  Active Listener and Interactive    Participation Quality: Appropriate, Attentive and Sharing      Speech:  normal      Thought Process/Content: Logical      Affective Functioning: Flat      Mood: calm      Level of consciousness:  Alert, Oriented x4 and Attentive      Response to Learning: Progressing to goal      Endings: None Reported    Modes of Intervention: Education, Support, Socialization and Activity      Discipline Responsible: Psychoeducational Specialist      Signature:  Hriam Walker

## 2021-09-26 NOTE — GROUP NOTE
Group Therapy Note    Date: 9/26/2021    Group Start Time: 1630  Group End Time: 1700  Group Topic: Psychoeducation    [facility]    Jacob Santiago RN        Group Therapy Note    Attendees: 13         Patient's Goal:  To improve socialization. Notes:  Patient actively participated in group.     Status After Intervention:  Improved    Participation Level: Interactive    Participation Quality: Appropriate      Speech:  normal      Thought Process/Content: Logical      Affective Functioning: Congruent      Mood: normal      Level of consciousness:  Alert      Response to Learning: Able to verbalize current knowledge/experience      Endings: None Reported    Modes of Intervention: Socialization      Discipline Responsible: Registered Nurse      Signature:  Jacob Santiago RN

## 2021-09-26 NOTE — PROGRESS NOTES
Pt. attended the 0900 community meeting.  Electronically signed by Panda Saeed on 9/26/2021 at 9:35 AM

## 2021-09-26 NOTE — GROUP NOTE
Group Therapy Note    Date: 9/25/2021    Group Start Time: 1930  Group End Time: 2000  Group Topic: Recreational    [facility]    Shelby Manzanares        Group Therapy Note    Attendees: 16         Patient's Goal: To play wii [employer]    Notes:  Pt participated in group    Status After Intervention:  Unchanged    Participation Level:  Active Listener and Interactive    Participation Quality: Appropriate and Attentive      Speech:  normal      Thought Process/Content: Logical      Affective Functioning: Congruent      Mood: euthymic      Level of consciousness:  Alert      Response to Learning: Able to verbalize current knowledge/experience      Endings: None Reported    Modes of Intervention: Activity      Discipline Responsible: Behavorial Health Tech      Signature:  Shelby Manzanares

## 2021-09-26 NOTE — GROUP NOTE
Group Therapy Note    Date: 9/25/2021    Group Start Time: 2000  Group End Time: 2030  Group Topic: Wrap-Up    [facility]    [address] [place]        Group Therapy Note    Attendees: 13         Patient's Goal:  To be in a good mood all day    Notes:  Pt reported meeting her goal and had a good day    Status After Intervention:  Improved    Participation Level:  Active Listener and Interactive    Participation Quality: Appropriate, Attentive and Sharing      Speech:  normal      Thought Process/Content: Logical      Affective Functioning: Congruent      Mood: euthymic      Level of consciousness:  Alert and Oriented x4      Response to Learning: Able to verbalize current knowledge/experience      Endings: None Reported    Modes of Intervention: Activity      Discipline Responsible: [employer]      Signature:  Ponce Catalan

## 2021-09-26 NOTE — PROGRESS NOTES
Patient voiced anxiety, rate 3/10, with 10 being the worst, medicated with Vistaril, per request. Will continue to monitor.

## 2021-09-26 NOTE — PROGRESS NOTES
[facility]. FOLLOW-UP NOTE         Behavioral Services                                              Medicare Re-Certification    I certify that the inpatient psychiatric hospital services furnished since the previous certification/re-certification were, and continue to be, medically necessary for;    [x] (1) Treatment which could reasonably be expected to improve the patient's condition,    [x] (2) Or for diagnostic study. Estimated length of stay/service 2-3 days    Plan for post-hospital care follow up with outpatient provider, start Long Acting Injectable antipsychotic    This patient continues to need, on a daily basis, active treatment furnished directly by or requiring the supervision of inpatient psychiatric personnel. 9/26/2021     Patient was seen and examined in person, Chart reviewed   Patient's case discussed with staff/team    Chief Complaint: derrick    Interim History:     Patient says she is feeling \"OK\". She said her sleep was \"not the best\" but acceptable. She said her appetite was good. She denied suicidal or homicidal ideation. She denied hallucinations, paranoia or other delusions.        Appetite:   [x] Normal/Unchanged  [] Increased  [] Decreased      Sleep:       [] Normal/Unchanged  [x] Fair       [] Poor              Energy:    [] Normal/Unchanged  [x] Increased  [] Decreased        SI [] Present  [x] Absent    HI  []Present  [x] Absent     Aggression:  [] yes  [x] no    Patient is [x] able  [] unable to CONTRACT FOR SAFETY     PAST MEDICAL/PSYCHIATRIC HISTORY:   Past Medical History:   Diagnosis Date    Anxiety     Cancer ([facility] 05/10/2017    DCIS left breast    Depression     Manic depression ([facility]     Migraines     Neuropathy     Thyroid disease        FAMILY/SOCIAL HISTORY:  Family History   Problem Relation Age of Onset    High Blood Pressure Mother     High Cholesterol Mother     Cancer Father         brain cancer    Stroke Father     Other Father         ulcerative colitis    High Blood Pressure Father     Cancer Sister         uterine cancer    High Blood Pressure Sister     High Blood Pressure Brother      Social History     Socioeconomic History    Marital status:      Spouse name: Not on file    Number of children: Not on file    Years of education: Not on file    Highest education level: Not on file   Occupational History    Not on file   Tobacco Use    Smoking status: Current Every Day Smoker     Packs/day: 1.00     Years: 13.00     Pack years: 13.00     Types: Cigarettes    Smokeless tobacco: Never Used    Tobacco comment: patient denies   Vaping Use    Vaping Use: Never used   Substance and Sexual Activity    Alcohol use: Yes     Comment: occasional     Drug use: No    Sexual activity: Yes   Other Topics Concern    Not on file   Social History Narrative    Not on file     Social Determinants of Health     Financial Resource Strain:     Difficulty of Paying Living Expenses:    Food Insecurity:     Worried About Running Out of Food in the Last Year:     Ran Out of Food in the Last Year:    Transportation Needs:     Lack of Transportation (Medical):  Lack of Transportation (Non-Medical):    Physical Activity:     Days of Exercise per Week:     Minutes of Exercise per Session:    Stress:     Feeling of Stress :    Social Connections:     Frequency of Communication with Friends and Family:     Frequency of Social Gatherings with Friends and Family:     Attends Religion Services:     Active Member of Clubs or Organizations:     Attends Club or Organization Meetings:     Marital Status:    Intimate Partner Violence:     Fear of Current or Ex-Partner:     Emotionally Abused:     Physically Abused:     Sexually Abused:            ROS:  [x] All negative/unchanged except if checked.  Explain positive(checked items) below:  [] Constitutional  [] Eyes  [] Ear/Nose/Mouth/Throat  [] Respiratory  [] CV  [] GI  []   [] Musculoskeletal  [] Skin/Breast  [] Neurological  [] Endocrine  [] Heme/Lymph  [] Allergic/Immunologic    Explanation:     MEDICATIONS:    Current Facility-Administered Medications:     [START ON 9/27/2021] paliperidone palmitate ER (INVEGA SUSTENNA) IM injection 234 mg, 234 mg, IntraMUSCular, Once **FOLLOWED BY** [START ON 10/4/2021] paliperidone palmitate ER (INVEGA SUSTENNA) IM injection 156 mg, 156 mg, IntraMUSCular, Once **FOLLOWED BY** [START ON 10/22/2021] paliperidone palmitate ER (INVEGA SUSTENNA) IM injection 117 mg, 117 mg, IntraMUSCular, Q30 Days, Yazan Shin MD    valproic acid (DEPAKENE) 250 MG/5ML oral solution 500 mg, 500 mg, Oral, BID, Yazan Shin MD, 500 mg at 09/26/21 0917    melatonin disintegrating tablet 5 mg, 5 mg, Oral, Nightly, Ting Sierra, APRN - CNP, 5 mg at 09/25/21 2117    levothyroxine (SYNTHROID) tablet 50 mcg, 50 mcg, Oral, Daily, Yazan Shin MD, 50 mcg at 09/26/21 0601    triamterene-hydroCHLOROthiazide (MAXZIDE-25) 37.5-25 MG per tablet 1 tablet, 1 tablet, Oral, Daily, Yazan Shin MD, 1 tablet at 09/26/21 0916    paliperidone (INVEGA) extended release tablet 3 mg, 3 mg, Oral, Daily, Yazan Shin MD, 3 mg at 09/26/21 0916    anastrozole (ARIMIDEX) tablet 1 mg, 1 mg, Oral, Daily, Terrell Reaves, , 1 mg at 09/26/21 0916    haloperidol lactate (HALDOL) injection 5 mg, 5 mg, IntraMUSCular, Q6H PRN **OR** haloperidol (HALDOL) tablet 5 mg, 5 mg, Oral, Q6H PRN, Yazan Shin MD    hydrOXYzine (VISTARIL) capsule 50 mg, 50 mg, Oral, Q6H PRN, 50 mg at 09/26/21 1538 **OR** hydrOXYzine (VISTARIL) injection 50 mg, 50 mg, IntraMUSCular, Q6H PRN, Yazan Shin MD    acetaminophen (TYLENOL) tablet 650 mg, 650 mg, Oral, Q4H PRN, Yazan Shin MD, 650 mg at 09/26/21 1310    traZODone (DESYREL) tablet 50 mg, 50 mg, Oral, Nightly PRN, Yazan Shin MD, 50 mg at 09/25/21 2212    benztropine mesylate (COGENTIN) injection 2 mg, 2 mg, IntraMUSCular, BID PRN, Usha Suggs MD    magnesium hydroxide (MILK OF MAGNESIA) 400 MG/5ML suspension 30 mL, 30 mL, Oral, Daily PRN, Usha Suggs MD    nicotine polacrilex (NICORETTE) gum 2 mg, 2 mg, Oral, PRN, Usha Suggs MD, 2 mg at 09/26/21 1310      Examination:  BP (!) 145/95   Pulse 99   Temp 97.9 °F (36.6 °C) (Oral)   Resp 18   Ht 5' 8\" (1.727 m)   Wt 165 lb (74.8 kg)   SpO2 96%   BMI 25.09 kg/m²   Gait - steady  Medication side effects(SE): no    Mental Status Examination:    Level of consciousness:  within normal limits   Appearance:  fair grooming and fair hygiene  Behavior/Motor:  hyperactive  Attitude toward examiner:  playful  Speech:  hyperverbal, pressured    Mood: calmer, bright, stated to be \"happy\"  Affect:  mood congruent  Thought processes:  Rapid, at times tangential   Thought content:  Delusions: less grandiose  Perceptual Disturbance:  denies any perceptual disturbance  Cognition:  oriented to person, place, and time   Concentration poor  Insight limited   Judgement limited     ASSESSMENT:   Patient symptoms are:  [x] Well controlled  [x] Improving  [] Worsening  [] No change      Diagnosis:   Principal Problem:    Bipolar disorder, curr episode mixed, severe, w/o psychotic features ([facility]  Resolved Problems:    * No resolved hospital problems. *      LABS:    No results for input(s): WBC, HGB, PLT in the last 72 hours. No results for input(s): NA, K, CL, CO2, BUN, CREATININE, GLUCOSE in the last 72 hours. No results for input(s): BILITOT, ALKPHOS, AST, ALT in the last 72 hours.   Lab Results   Component Value Date    LABAMPH Neg 09/17/2021    BARBSCNU Neg 09/17/2021    LABBENZ POSITIVE 09/17/2021    LABMETH Neg 07/19/2021    OPIATESCREENURINE Neg 09/17/2021    PHENCYCLIDINESCREENURINE Neg 09/17/2021    ETOH <10 09/17/2021     Lab Results   Component Value Date    TSH 2.060 07/19/2021     No results found for: LITHIUM  Lab Results   Component Value Date    VALPROATE 22.6 (L) 09/23/2021       RISK ASSESSMENT: high risk of impulsivity and non compliance on discharge in her current state    Treatment Plan:  Reviewed current Medications with the patient. Neil lindsay ordered but will only be available Monday  Depakote level - 22.6 on 09/23/21  Risks, benefits, side effects, drug-to-drug interactions and alternatives to treatment were discussed. Collateral information: reviewed  Encourage patient to attend group and other milieu activities.   Discharge planning discussed with the patient and treatment team.    PSYCHOTHERAPY/COUNSELING:  [x] Therapeutic interview  [x] Supportive  [] CBT  [] Ongoing  [] Other    [x] Patient continues to need, on a daily basis, active treatment furnished directly by or requiring the supervision of inpatient psychiatric personnel            Electronically signed by Tigre Gavin MD on 9/26/2021 at 5:37 PM

## 2021-09-26 NOTE — PROGRESS NOTES
Patient has been social and friendly. She denies the need to remain hospitalized. She wants to go home to follow outpatient. She voiced frustration about the medication not being readily available. She also expressed anger that the doctor did not call her  on Friday. Given positive feedback about her progress and willingness to comply with long acting medication. No SI, HI, or hallucinations. Less grandiose.

## 2021-09-27 PROCEDURE — 6360000002 HC RX W HCPCS: Performed by: PSYCHIATRY & NEUROLOGY

## 2021-09-27 PROCEDURE — 90833 PSYTX W PT W E/M 30 MIN: CPT | Performed by: PSYCHIATRY & NEUROLOGY

## 2021-09-27 PROCEDURE — 6370000000 HC RX 637 (ALT 250 FOR IP): Performed by: STUDENT IN AN ORGANIZED HEALTH CARE EDUCATION/TRAINING PROGRAM

## 2021-09-27 PROCEDURE — 99232 SBSQ HOSP IP/OBS MODERATE 35: CPT | Performed by: PSYCHIATRY & NEUROLOGY

## 2021-09-27 PROCEDURE — 6370000000 HC RX 637 (ALT 250 FOR IP): Performed by: PSYCHIATRY & NEUROLOGY

## 2021-09-27 PROCEDURE — 1240000000 HC EMOTIONAL WELLNESS R&B

## 2021-09-27 PROCEDURE — 6370000000 HC RX 637 (ALT 250 FOR IP): Performed by: NURSE PRACTITIONER

## 2021-09-27 RX ORDER — DIVALPROEX SODIUM 500 MG/1
500 TABLET, DELAYED RELEASE ORAL EVERY 12 HOURS SCHEDULED
Status: DISCONTINUED | OUTPATIENT
Start: 2021-09-27 | End: 2021-09-28 | Stop reason: HOSPADM

## 2021-09-27 RX ADMIN — PALIPERIDONE PALMITATE 234 MG: 234 INJECTION INTRAMUSCULAR at 10:00

## 2021-09-27 RX ADMIN — TRIAMTERENE AND HYDROCHLOROTHIAZIDE 1 TABLET: 37.5; 25 TABLET ORAL at 09:38

## 2021-09-27 RX ADMIN — DIVALPROEX SODIUM 500 MG: 500 TABLET, DELAYED RELEASE ORAL at 20:58

## 2021-09-27 RX ADMIN — ACETAMINOPHEN 650 MG: 325 TABLET ORAL at 18:15

## 2021-09-27 RX ADMIN — HYDROXYZINE PAMOATE 50 MG: 50 CAPSULE ORAL at 18:34

## 2021-09-27 RX ADMIN — PALIPERIDONE 3 MG: 3 TABLET, EXTENDED RELEASE ORAL at 09:37

## 2021-09-27 RX ADMIN — ACETAMINOPHEN 650 MG: 325 TABLET ORAL at 14:18

## 2021-09-27 RX ADMIN — Medication 5 MG: at 20:58

## 2021-09-27 RX ADMIN — TRAZODONE HYDROCHLORIDE 50 MG: 50 TABLET ORAL at 20:58

## 2021-09-27 RX ADMIN — VALPROIC ACID 500 MG: 250 SOLUTION ORAL at 09:38

## 2021-09-27 RX ADMIN — LEVOTHYROXINE SODIUM 50 MCG: 0.05 TABLET ORAL at 06:29

## 2021-09-27 RX ADMIN — NICOTINE POLACRILEX 2 MG: 2 GUM, CHEWING BUCCAL at 18:15

## 2021-09-27 RX ADMIN — ACETAMINOPHEN 650 MG: 325 TABLET ORAL at 09:55

## 2021-09-27 RX ADMIN — ANASTROZOLE 1 MG: 1 TABLET, COATED ORAL at 09:55

## 2021-09-27 RX ADMIN — NICOTINE POLACRILEX 2 MG: 2 GUM, CHEWING BUCCAL at 12:54

## 2021-09-27 ASSESSMENT — PAIN SCALES - GENERAL
PAINLEVEL_OUTOF10: 4
PAINLEVEL_OUTOF10: 3
PAINLEVEL_OUTOF10: 1
PAINLEVEL_OUTOF10: 3

## 2021-09-27 NOTE — PROGRESS NOTES
Pt. attended the 0900 community meeting. Electronically signed by Adama Dennis, [address] on 9/27/2021 at 9:48 AM

## 2021-09-27 NOTE — PROGRESS NOTES
Pt is upset d/t pt's daughter telling pt's sister she is in the hospital. Pt is anxious for d/c and feels that she is more than ready. Voices frustration d/t long acting medication not available and is worried it will not be here Monday either. Reassurance provided.

## 2021-09-27 NOTE — PROGRESS NOTES
Pt is out on unit and social with peers. Focused on discharge. Pt is tangential. Pt states her sleep and appetite are good. Attending groups. Pt states she is looking forward to going home. Appears well kempt and reports showering. Compliant with [facility] shot.

## 2021-09-27 NOTE — PROGRESS NOTES
[facility]. FOLLOW-UP NOTE       9/27/2021     Patient was seen and examined in person, Chart reviewed   Patient's case discussed with staff/team    Chief Complaint: Irene    Interim History:     Pt report feeling better  Denies manic symptoms  Agreeable to have the sot and booster in 1 week  Agreeable not to drive or use alcohol  Family applying for guardianship  Pt eating and sleeping good  Appetite:   [x] Normal/Unchanged  [] Increased  [] Decreased      Sleep:       [] Normal/Unchanged  [x] Fair       [] Poor              Energy:    [x] Normal/Unchanged  [] Increased  [] Decreased        SI [] Present  [x] Absent    HI  []Present  [x] Absent     Aggression:  [] yes  [x] no    Patient is [x] able  [] unable to CONTRACT FOR SAFETY     PAST MEDICAL/PSYCHIATRIC HISTORY:   Past Medical History:   Diagnosis Date    Anxiety     Cancer ([facility] 05/10/2017    DCIS left breast    Depression     Manic depression ([facility]     Migraines     Neuropathy     Thyroid disease        FAMILY/SOCIAL HISTORY:  Family History   Problem Relation Age of Onset    High Blood Pressure Mother     High Cholesterol Mother     Cancer Father         brain cancer    Stroke Father     Other Father         ulcerative colitis    High Blood Pressure Father     Cancer Sister         uterine cancer    High Blood Pressure Sister     High Blood Pressure Brother      Social History     Socioeconomic History    Marital status:      Spouse name: Not on file    Number of children: Not on file    Years of education: Not on file    Highest education level: Not on file   Occupational History    Not on file   Tobacco Use    Smoking status: Current Every Day Smoker     Packs/day: 1.00     Years: 13.00     Pack years: 13.00     Types: Cigarettes    Smokeless tobacco: Never Used    Tobacco comment: patient denies   Vaping Use    Vaping Use: Never used   Substance and Sexual Activity    Alcohol use: Yes     Comment: occasional     Drug use: No    Sexual activity: Yes   Other Topics Concern    Not on file   Social History Narrative    Not on file     Social Determinants of Health     Financial Resource Strain:     Difficulty of Paying Living Expenses:    Food Insecurity:     Worried About Running Out of Food in the Last Year:     [address] in the Last Year:    Transportation Needs:     Lack of Transportation (Medical):  Lack of Transportation (Non-Medical):    Physical Activity:     Days of Exercise per Week:     Minutes of Exercise per Session:    Stress:     Feeling of Stress :    Social Connections:     Frequency of Communication with Friends and Family:     Frequency of Social Gatherings with Friends and Family:     Attends Buddhism Services:     Active Member of Clubs or Organizations:     Attends Club or Organization Meetings:     Marital Status:    Intimate Partner Violence:     Fear of Current or Ex-Partner:     Emotionally Abused:     Physically Abused:     Sexually Abused:            ROS:  [x] All negative/unchanged except if checked.  Explain positive(checked items) below:  [] Constitutional  [] Eyes  [] Ear/Nose/Mouth/Throat  [] Respiratory  [] CV  [] GI  []   [] Musculoskeletal  [] Skin/Breast  [] Neurological  [] Endocrine  [] Heme/Lymph  [] Allergic/Immunologic    Explanation:     MEDICATIONS:    Current Facility-Administered Medications:     divalproex (DEPAKOTE) DR tablet 500 mg, 500 mg, Oral, 2 times per day, Willie Vegas MD  [facility]  [COMPLETED] paliperidone palmitate ER (INVEGA SUSTENNA) IM injection 234 mg, 234 mg, IntraMUSCular, Once, 234 mg at 09/27/21 1000 **FOLLOWED BY** [START ON 10/4/2021] paliperidone palmitate ER (INVEGA SUSTENNA) IM injection 156 mg, 156 mg, IntraMUSCular, Once **FOLLOWED BY** [START ON 10/22/2021] paliperidone palmitate ER (INVEGA SUSTENNA) IM injection 117 mg, 117 mg, IntraMUSCular, Q30 Days, Willie Vegas MD    melatonin disintegrating tablet 5 mg, 5 mg, Oral, Nightly, Nicogilmar Bolzadaphnie-Roche, APRN - CNP, 5 mg at 09/26/21 2105    levothyroxine (SYNTHROID) tablet 50 mcg, 50 mcg, Oral, Daily, Radha Arriaga MD, 50 mcg at 09/27/21 0629    triamterene-hydroCHLOROthiazide (MAXZIDE-25) 37.5-25 MG per tablet 1 tablet, 1 tablet, Oral, Daily, Radha Arriaga MD, 1 tablet at 09/27/21 3439    paliperidone (INVEGA) extended release tablet 3 mg, 3 mg, Oral, Daily, Radha Arriaga MD, 3 mg at 09/27/21 0330    anastrozole (ARIMIDEX) tablet 1 mg, 1 mg, Oral, Daily, Marcelina Reaves DO, 1 mg at 09/27/21 0955    haloperidol lactate (HALDOL) injection 5 mg, 5 mg, IntraMUSCular, Q6H PRN **OR** haloperidol (HALDOL) tablet 5 mg, 5 mg, Oral, Q6H PRN, Radha Arriaga MD    hydrOXYzine (VISTARIL) capsule 50 mg, 50 mg, Oral, Q6H PRN, 50 mg at 09/26/21 1538 **OR** hydrOXYzine (VISTARIL) injection 50 mg, 50 mg, IntraMUSCular, Q6H PRN, Radha Arriaga MD    acetaminophen (TYLENOL) tablet 650 mg, 650 mg, Oral, Q4H PRN, Radha Arriaga MD, 650 mg at 09/27/21 0955    traZODone (DESYREL) tablet 50 mg, 50 mg, Oral, Nightly PRN, Radha Arriaga MD, 50 mg at 09/26/21 2137    benztropine mesylate (COGENTIN) injection 2 mg, 2 mg, IntraMUSCular, BID PRN, Radha Arriaga MD    magnesium hydroxide (MILK OF MAGNESIA) 400 MG/5ML suspension 30 mL, 30 mL, Oral, Daily PRN, Radha Arriaga MD    nicotine polacrilex (NICORETTE) gum 2 mg, 2 mg, Oral, PRN, Radha Arriaga MD, 2 mg at 09/26/21 4627      Examination:  /79   Pulse 102   Temp 98.2 °F (36.8 °C) (Oral)   Resp 18   Ht 5' 8\" (1.727 m)   Wt 165 lb (74.8 kg)   SpO2 97%   BMI 25.09 kg/m²   Gait - steady  Medication side effects(SE): no    Mental Status Examination:    Level of consciousness:  within normal limits   Appearance:  fair grooming and fair hygiene  Behavior/Motor:  no abnormalities noted  Attitude toward examiner:  cooperative  Speech:  normal rate   Mood: anxious  Affect:  mood congruent  Thought processes:  goal directed   Thought content:  Suicidal Ideation:  denies suicidal ideation  Cognition:  oriented to person, place, and time   Concentration poor  Insight fair   Judgement fair     ASSESSMENT:   Patient symptoms are:  [] Well controlled  [] Improving  [] Worsening  [] No change      Diagnosis:   Principal Problem:    Bipolar disorder, curr episode mixed, severe, w/o psychotic features ([facility]  Resolved Problems:    * No resolved hospital problems. *      LABS:    No results for input(s): WBC, HGB, PLT in the last 72 hours. No results for input(s): NA, K, CL, CO2, BUN, CREATININE, GLUCOSE in the last 72 hours. No results for input(s): BILITOT, ALKPHOS, AST, ALT in the last 72 hours. Lab Results   Component Value Date    LABAMPH Neg 09/17/2021    BARBSCNU Neg 09/17/2021    LABBENZ POSITIVE 09/17/2021    LABMETH Neg 07/19/2021    OPIATESCREENURINE Neg 09/17/2021    PHENCYCLIDINESCREENURINE Neg 09/17/2021    ETOH <10 09/17/2021     Lab Results   Component Value Date    TSH 2.060 07/19/2021     No results found for: LITHIUM  Lab Results   Component Value Date    VALPROATE 22.6 (L) 09/23/2021       Treatment Plan:  Reviewed current Medications with the patient. Medication as ordered  Risks, benefits, side effects, drug-to-drug interactions and alternatives to treatment were discussed. Collateral information: reviewed  CD evaluation  Encourage patient to attend group and other milieu activities.   Discharge planning discussed with the patient and treatment team.    PSYCHOTHERAPY/COUNSELING:  [x] Therapeutic interview  [x] Supportive  [] CBT  [] Ongoing  [] Other  Patient was seen 1:1 for 20 minutes, other than E&M time spent, focusing on      - coping skills techniques     - Anxiety management techniques discussed including deep breathing exercise and PMR     - discussing patients strength and weakness      - Motivational interviewing to assess the stage of change and assessing patient readiness to quit substance use.      - Focusing on negative cognition and maladaptive thoughts, which is feeding and maintaining the depression symptoms      [x] Patient continues to need, on a daily basis, active treatment furnished directly by or requiring the supervision of inpatient psychiatric personnel      Anticipated Length of stay            Electronically signed by Chris Cortes MD on 9/27/2021 at 10:40 AM

## 2021-09-27 NOTE — CARE COORDINATION
Spoke to patients  and daughter regarding discharge plan for tomorrow. They would like a family session over speakerphone before patient is discharged. Topics they would like addressed are: 1. Aisha Dave needs to get the booster - this will be on 10/4 at the infusion center at [facility] , family will need to call for an apptmt time at [phone] ( call this wed or thurs for 10/4 apptmt )     2. Aisha Dave will be going to [facility]    3. Family would like doctor to reinforce a no alcohol policy. Family has concerns about drinking. Patient cannot be told \" drink rex moderation\" as she will not follow this per family. 4. Can patient drive or should driving privileges be revoked until patient is more stable  Family has concerns regarding her driving due to her taking off and ending up in [address] in the past. They are worried about her safety. Can this be placed on the discharge orders under instructions? 5. Family will lock up bottles and give meds daily. Patient needs to be aware this will happen.

## 2021-09-27 NOTE — FLOWSHEET NOTE
Pt has been visible out on the unit watching TV and social with peers . Pt reports she slept \"well\" and has been attending groups. Pt denies the need to be here and has limited insight. Pt has been calm and cooperative and medication compliant. Pt denies SI,HI,AVH.

## 2021-09-27 NOTE — GROUP NOTE
Group Therapy Note    Date: 9/27/2021    Group Start Time: 1110  Group End Time: 5560  Group Topic: Healthy Living/Wellness    [facility]    Cristin Florez        Group Therapy Note    Attendees: 5/9         Patient's Goal:  \"to go home tomorrow\"    Notes:  Patient actively participated in group discussion. Patient states she is feeling\"excellent. \"     Status After Intervention:  Improved    Participation Level:  Active Listener and Interactive    Participation Quality: Appropriate, Attentive and Sharing      Speech:  normal      Thought Process/Content: Logical      Affective Functioning: Congruent      Mood: euthymic      Level of consciousness:  Alert and Attentive      Response to Learning: Progressing to goal      Endings: None Reported    Modes of Intervention: Support      Discipline Responsible: [address], [employer]      Signature:  Cristin Florez

## 2021-09-27 NOTE — PROGRESS NOTES
Patient did not attend group despite staff encouragement.   Electronically signed by Juvenal Huerta on 9/27/2021 at 5:15 PM

## 2021-09-27 NOTE — GROUP NOTE
Group Therapy Note    Date: 9/21/2021    Group Start Time: 1100  Group End Time: 36  Group Topic: Psychotherapy    [facility]        [facility]    Attendees: 5         Patient's Goal:  To be discharged    Notes:  Patient stated she wants her shot and wants to leave    Status After Intervention:  Unchanged    Participation Level: Minimal    Participation Quality: Attentive      Speech:  normal      Thought Process/Content: Logical      Affective Functioning: Congruent      Mood: anxious      Level of consciousness:  Alert      Response to Learning: Progressing to goal      Endings: None Reported    Modes of Intervention: Support      Discipline Responsible: /Counselor      Signature:  Malik Cabrera [facility]

## 2021-09-27 NOTE — PROGRESS NOTES
Patient visible on unit, social with peers. Pt pleasant and cooperative. Bright affect. Pt reports good sleep and appetite. Pt states she showered and did her laundry today. Pt denies SI, HI, and Hallucinations. Pt future oriented, states she is looking forward to discharge.  Electronically signed by Briseyda Aldrich RN on 9/27/21 at 4:39 PM EDT

## 2021-09-27 NOTE — GROUP NOTE
Group Therapy Note    Date: 9/27/2021    Group Start Time: 5619  Group End Time: 1046  Group Topic: Healthy Living/Wellness    [facility]    Anais Ramon        Group Therapy Note    Attendees: 9/18         Patient's Goal:  To learn about anxiety and how to challenge anxious thoughts    Notes:  Patient actively participated. Patient was monopolizing at times.     Status After Intervention:  Unchanged    Participation Level: Monopolizing    Participation Quality: Attentive and Sharing      Speech:  normal      Thought Process/Content: Logical      Affective Functioning: Congruent      Mood: euthymic      Level of consciousness:  Alert and Attentive      Response to Learning: Able to verbalize current knowledge/experience      Endings: None Reported    Modes of Intervention: Education      Discipline Responsible: [address], [employer]      Signature:  Anais Ramon

## 2021-09-27 NOTE — PROGRESS NOTES
Pt. refused to attend the 1000 skills group, despite staff encouragement. Electronically signed by Silver Driscoll, [address] on 9/27/2021 at 10:58 AM

## 2021-09-28 VITALS
HEIGHT: 68 IN | DIASTOLIC BLOOD PRESSURE: 79 MMHG | TEMPERATURE: 97.9 F | HEART RATE: 108 BPM | WEIGHT: 165 LBS | RESPIRATION RATE: 18 BRPM | OXYGEN SATURATION: 96 % | SYSTOLIC BLOOD PRESSURE: 137 MMHG | BODY MASS INDEX: 25.01 KG/M2

## 2021-09-28 PROCEDURE — 6370000000 HC RX 637 (ALT 250 FOR IP): Performed by: STUDENT IN AN ORGANIZED HEALTH CARE EDUCATION/TRAINING PROGRAM

## 2021-09-28 PROCEDURE — 99239 HOSP IP/OBS DSCHRG MGMT >30: CPT | Performed by: PSYCHIATRY & NEUROLOGY

## 2021-09-28 PROCEDURE — 6370000000 HC RX 637 (ALT 250 FOR IP): Performed by: PSYCHIATRY & NEUROLOGY

## 2021-09-28 RX ORDER — PALIPERIDONE 3 MG/1
3 TABLET, EXTENDED RELEASE ORAL DAILY
Qty: 30 TABLET | Refills: 0 | Status: SHIPPED | OUTPATIENT
Start: 2021-09-29

## 2021-09-28 RX ORDER — LEVOTHYROXINE SODIUM 0.05 MG/1
50 TABLET ORAL DAILY
Qty: 30 TABLET | Refills: 1 | Status: SHIPPED | OUTPATIENT
Start: 2021-09-29

## 2021-09-28 RX ORDER — DIVALPROEX SODIUM 500 MG/1
500 TABLET, DELAYED RELEASE ORAL EVERY 12 HOURS SCHEDULED
Qty: 30 TABLET | Refills: 3 | Status: SHIPPED | OUTPATIENT
Start: 2021-09-28

## 2021-09-28 RX ORDER — TRAZODONE HYDROCHLORIDE 50 MG/1
50 TABLET ORAL NIGHTLY PRN
Qty: 15 TABLET | Refills: 2 | Status: SHIPPED | OUTPATIENT
Start: 2021-09-28

## 2021-09-28 RX ADMIN — PALIPERIDONE 3 MG: 3 TABLET, EXTENDED RELEASE ORAL at 08:55

## 2021-09-28 RX ADMIN — HYDROXYZINE PAMOATE 50 MG: 50 CAPSULE ORAL at 08:55

## 2021-09-28 RX ADMIN — TRIAMTERENE AND HYDROCHLOROTHIAZIDE 1 TABLET: 37.5; 25 TABLET ORAL at 08:56

## 2021-09-28 RX ADMIN — LEVOTHYROXINE SODIUM 50 MCG: 0.05 TABLET ORAL at 06:27

## 2021-09-28 RX ADMIN — ACETAMINOPHEN 650 MG: 325 TABLET ORAL at 07:14

## 2021-09-28 RX ADMIN — ANASTROZOLE 1 MG: 1 TABLET, COATED ORAL at 08:56

## 2021-09-28 RX ADMIN — NICOTINE POLACRILEX 2 MG: 2 GUM, CHEWING BUCCAL at 08:56

## 2021-09-28 RX ADMIN — NICOTINE POLACRILEX 2 MG: 2 GUM, CHEWING BUCCAL at 07:14

## 2021-09-28 RX ADMIN — DIVALPROEX SODIUM 500 MG: 500 TABLET, DELAYED RELEASE ORAL at 08:55

## 2021-09-28 RX ADMIN — ACETAMINOPHEN 650 MG: 325 TABLET ORAL at 11:19

## 2021-09-28 ASSESSMENT — PAIN SCALES - GENERAL
PAINLEVEL_OUTOF10: 3
PAINLEVEL_OUTOF10: 3

## 2021-09-28 NOTE — PROGRESS NOTES
Patient accepted PRN trazodone for sleep upon request. Electronically signed by Charlie Bateman RN on 9/27/21 at 9:00 PM EDT

## 2021-09-28 NOTE — PROGRESS NOTES
Pt. attended the 0900 community meeting. Electronically signed by Marilee Joya, [address] on 9/28/2021 at 9:51 AM

## 2021-09-28 NOTE — DISCHARGE SUMMARY
DISCHARGE SUMMARY      Patient ID:  Jesus Manuel Hadley  [id number]  77 y.o.  1960      Admit date: 9/17/2021    Discharge date and time: 9/28/2021    Admitting Physician: Hayder Munoz MD     Discharge Physician: Dr Corey Logan MD    Admission Diagnoses: Bipolar 1 disorder ([facility] [address] [F31.9]  Bipolar affective disorder, manic, severe ([facility] [address] [F31.13]    Admission Condition: poor    Discharged Condition: stable    Admission Circumstance: Jesus Manuel Hadley  is a 59 y.o. female with history of treatment for mood disorder who was admitted from the emergency department due to not sleeping, anxiety, racing thoughts. 59year old female presented from [facility]. Per notes, she was reported missing last night by her . She presents manic and fixated on getting a flu shot. She denies any issues, and was stating \"Imnot suicidal or homicidal\" as she was coming down the castaneda with EMS. Per  and outpatient records, with exception to her Ativan, patient is not taking any of her medications, and is very manic at home. Records also show she fired her outpatient psychiatrist, and has not found another yet. She presents very disheveled, with rapid and pressured speech.  Clearly not dressed appropriately to be outside last night.     Pt during interview, minimizing all her symptoms  Report feeling great, not sure why she is in hospital  Denies any stressor  Focused on getting Ativan - not interested in any other medication  Pt has recently fired her OP psychiatrist and not seeing anybody  Does not think she need any help  Want to go home  Not able to tell me the reason for wandering from home     OARRS report:     09/10/2021  1   09/10/2021  Lorazepam 1 MG Tablet  28.00  14 Wi Phi   [id number]   Dis (0144)   0  2.00 L[place]   08/26/2021  1   08/12/2021  Lorazepam 1 MG Tablet  28.00  14 Cy Sen   [id number]   Dis (0144)   1  2.00 L[place]   08/19/2021  1   07/06/2021 Gabapentin 300 MG Capsule  90.00  30 Cy Sen   [id number]   Dis (0144)   1    [place]   08/12/2021  1   08/12/2021  Lorazepam 1 MG Tablet  28.00  14 Cy Sen   [id number]   Dis (0144)   0  2.00 L[place]   07/30/2021  1   07/30/2021  Lorazepam 1 MG Tablet  30.00  30 Cy Sen   [id number]   Dis (0144)   0  1.00 L[place]   07/29/2021  1   07/29/2021  Zolpidem Tartrate 5 MG Tablet  14.00  14 Ba Feliciano   [id number]   Dis (0144)   0  0.25 L[place]   07/15/2021  1   07/15/2021  Lorazepam 1 MG Tablet  30.00  10 Ra Ernesto   [id number]   Dis (0144)   0  3.00 L[place]   07/06/2021  1   07/06/2021  Gabapentin 300 MG Capsule  90.00  30 Cy Sen   [id number]   Dis (0144)   0    [place]   07/03/2021  1   07/02/2021  Lorazepam 1 MG Tablet  30.00  10 Cy Sen   [id number]   Dis (0144)   0  3.00 LME  [place]   06/25/2021  1   06/25/2021  Lorazepam 1 MG Tablet  30.00  10 Cy Sen   [id number]   Dis (0144)   0  3.00 L[place]   06/01/2021  1   05/30/2021  Gabapentin 400 MG Capsule  90.00  30 Roderick   [id number]   Dis (0144)   0    [place]   05/31/2021  1   05/30/2021  Lorazepam 1 MG Tablet  90.00  30 Sherine Olea   [id number]   Ohi (5653)   0  3.00 LME  [place]   05/03/2021  1   05/03/2021  Lorazepam 0.5 MG Tablet  30.00  30 Cy Sen   [id number]   Dis (0144)   0  0.50 LME  [place]   05/03/2021  1   05/03/2021  Lorazepam 1 MG Tablet  30.00  10 Cy Sen   [id number]   Dis (0144)   0  3.00 L[place]   04/21/2021  1   01/19/2021  Gabapentin 300 MG Capsule  90.00  30 Cy Sen   [id number]   Dis (0144)   3    [place]   04/07/2021  1   04/06/2021  Lorazepam 1 MG Tablet  120.00  30 Ra Ernesto   [id number]   Dis (0144)   0  4.00 LME  [place]   03/24/2021  1   01/19/2021  Gabapentin 300 MG Capsule  90.00  30 Cy Sen   [id number]   Dis (0144)   2    [place]   03/24/2021  1   03/24/2021  Lorazepam 1 MG Tablet  90.00  30 Cy Sen   [id number]   Dis (0144)   0  3.00 LME  [place]   03/08/2021  1   02/24/2021 Lorazepam 1 MG Tablet  56.00  14 Cy Sen   [id number]   Dis (0144)   1  4.00 LME  [place]   02/24/2021  1   02/24/2021  Lorazepam 0.5 MG Tablet  14.00  14 Cy Sen   [id number]   Dis (0144)   0  0.50 L[place]   02/24/2021  1   02/24/2021  Lorazepam 1 MG Tablet  56.00  14 Cy Sen   [id number]   Dis (0144)   0  4.00 LME  [place]   02/17/2021  1   01/19/2021  Gabapentin 300 MG Capsule  90.00  30 Cy Sen   [id number]   Dis (0144)   1    [place]   02/10/2021  1   02/10/2021  Lorazepam 1 MG Tablet  56.00  14 Cy Sen   [id number]   Dis (0144)   0  4.00 LME  [place]   02/10/2021  1   02/10/2021  Lorazepam 0.5 MG Tablet  14.00  14 Cy Sen   [id number]   Dis (0144)   0  0.50 LME  [place]   01/27/2021  1   01/27/2021  Lorazepam 1 MG Tablet  70.00  14 Cy Sen   [id number]   Dis (0144)   0  5.00 LME  [place]   01/19/2021  1   01/19/2021  Gabapentin 300 MG Capsule  90.00  30 Cy Sen   [id number]   Dis (0144)   0    [place]   11/18/2020  1   11/17/2020  Gabapentin 300 MG Capsule  90.00  30 Cy Sen   [id number]   Dis (0144)   0    [place]   11/17/2020  1   11/17/2020  Lorazepam 1 MG Tablet  150.00  30 Cy Sen   [id number]   Dis (0144)   0  5.00 LME  [place]   11/11/2020  1   11/11/2020  Lorazepam 1 MG Tablet  35.00  7 Cy Sen   [id number]   Joyce (6866)   0  5.00 LME  [place]   [place]   10/21/2020  1   05/18/2020  Gabapentin 300 MG Capsule  90.00  30 Cy Sen   [id number]   Dis (0144)   1    [place]   10/19/2020  1   10/19/2020  Lorazepam 1 MG Tablet  150.00  30 Cy Sen   [id number]   Dis (0144)   0  5.00 L[place]   09/23/2020  1   09/23/2020  Lorazepam 2 MG Tablet  90.00  30 Cy Sen   [id number]   Dis (0144)   0  6.00 LME  [place]   09/20/2020  1   05/18/2020  Gabapentin 300 MG Capsule  90.00  30 Cy Sen   [id number]   Dis (0144)   0    [place]   07/31/2020  1   07/31/2020  Lorazepam 2 MG Tablet  105.00  27 Cy Sen   [id number]   Dis (0144)   0  7.78 LME  [place]   07/20/2020  1   05/18/2020  Gabapentin 300 MG Capsule  90.00  30 Cy Sen   [id number]   Dis (0149)   0    [place]   07/06/2020  1   07/06/2020  Lorazepam 2 MG Tablet  105.00  26 Cy Sen   [id number]   Joyce (6848)   0  8.08 LME  [place] Ins   [place]            PAST MEDICAL/PSYCHIATRIC HISTORY:   Past Medical History:   Diagnosis Date    Anxiety     Cancer ([facility] [place] [address] 05/10/2017    DCIS left breast    Depression     Manic depression (HCC)     Migraines     Neuropathy     Thyroid disease        FAMILY/SOCIAL HISTORY:  Family History   Problem Relation Age of Onset    High Blood Pressure Mother     High Cholesterol Mother     Cancer Father         brain cancer    Stroke Father     Other Father         ulcerative colitis    High Blood Pressure Father     Cancer Sister         uterine cancer    High Blood Pressure Sister     High Blood Pressure Brother      Social History     Socioeconomic History    Marital status:      Spouse name: Not on file    Number of children: Not on file    Years of education: Not on file    Highest education level: Not on file   Occupational History    Not on file   Tobacco Use    Smoking status: Current Every Day Smoker     Packs/day: 1.00     Years: 13.00     Pack years: 13.00     Types: Cigarettes    Smokeless tobacco: Never Used    Tobacco comment: patient denies   Vaping Use    Vaping Use: Never used   Substance and Sexual Activity    Alcohol use: Yes     Comment: occasional     Drug use: No    Sexual activity: Yes   Other Topics Concern    Not on file   Social History Narrative    Not on file     Social Determinants of Health     Financial Resource Strain:     Difficulty of Paying Living Expenses:    Food Insecurity:     Worried About Running Out of Food in the Last Year:     Ran Out of Food in the Last Year:    Transportation Needs:     Lack of Transportation (Medical):      Lack of Transportation (Non-Medical):    Physical Activity:     Days of Exercise per Week:     Minutes of Exercise per Session:    Stress:     Feeling of Stress :    Social Connections:     Frequency of Communication with Friends and Family:     Frequency of Social Gatherings with Friends and Family:     Attends [employer] Services:     Active Member of Clubs or Organizations:     Attends Club or Organization Meetings:     Marital Status:    Intimate Partner Violence:     Fear of Current or Ex-Partner:     Emotionally Abused:     Physically Abused:     Sexually Abused:        MEDICATIONS:    Current Facility-Administered Medications:     divalproex (DEPAKOTE) DR tablet 500 mg, 500 mg, Oral, 2 times per day, Jorge Lyman MD, 500 mg at 09/28/21 0855    [COMPLETED] paliperidone palmitate ER (INVEGA SUSTENNA) IM injection 234 mg, 234 mg, IntraMUSCular, Once, 234 mg at 09/27/21 1000 **FOLLOWED BY** [START ON 10/4/2021] paliperidone palmitate ER (INVEGA SUSTENNA) IM injection 156 mg, 156 mg, IntraMUSCular, Once **FOLLOWED BY** [START ON 10/22/2021] paliperidone palmitate ER (INVEGA SUSTENNA) IM injection 117 mg, 117 mg, IntraMUSCular, Q30 Days, Jorge Lyman MD    melatonin disintegrating tablet 5 mg, 5 mg, Oral, Nightly, Ting Medina-Roche, APRN - CNP, 5 mg at 09/27/21 2058    levothyroxine (SYNTHROID) tablet 50 mcg, 50 mcg, Oral, Daily, Joreg Lyman MD, 50 mcg at 09/28/21 0627    triamterene-hydroCHLOROthiazide (MAXZIDE-25) 37.5-25 MG per tablet 1 tablet, 1 tablet, Oral, Daily, Jorge Lyman MD, 1 tablet at 09/28/21 0856    paliperidone (INVEGA) extended release tablet 3 mg, 3 mg, Oral, Daily, Jorge Lyman MD, 3 mg at 09/28/21 0855    anastrozole (ARIMIDEX) tablet 1 mg, 1 mg, Oral, Daily, Adrianna Nolan DO, 1 mg at 09/28/21 0856    haloperidol lactate (HALDOL) injection 5 mg, 5 mg, IntraMUSCular, Q6H PRN **OR** haloperidol (HALDOL) tablet 5 mg, 5 mg, Oral, Q6H PRN, Jorge Lyman MD    hydrOXYzine (VISTARIL) capsule 50 mg, 50 mg, Oral, Q6H PRN, 50 mg at 09/28/21 0855 **OR** hydrOXYzine (VISTARIL) injection 50 mg, 50 mg, IntraMUSCular, Q6H PRN, Michael Thompson MD    acetaminophen (TYLENOL) tablet 650 mg, 650 mg, Oral, Q4H PRN, Michael Thompson MD, 650 mg at 09/28/21 0714    traZODone (DESYREL) tablet 50 mg, 50 mg, Oral, Nightly PRN, Michael Thompson MD, 50 mg at 09/27/21 2058    benztropine mesylate (COGENTIN) injection 2 mg, 2 mg, IntraMUSCular, BID PRN, Michael Thompson MD    magnesium hydroxide (MILK OF MAGNESIA) 400 MG/5ML suspension 30 mL, 30 mL, Oral, Daily PRN, Michael Thompson MD    nicotine polacrilex (NICORETTE) gum 2 mg, 2 mg, Oral, PRN, Michael Thompson MD, 2 mg at 09/28/21 0856    Examination:  /79 Comment: 129/81 standing  Pulse 108   Temp 97.9 °F (36.6 °C) (Oral)   Resp 18   Ht 5' 8\" (1.727 m)   Wt 165 lb (74.8 kg)   SpO2 96%   BMI 25.09 kg/m²   Gait - steady    HOSPITAL COURSE[de-identified]  Following admission to the hospital, patient had a complete physical exam and blood work up  Patient was monitored closely with suicide precaution  Patient was started on depakote and invega  Benzo was tapered and stopped  Pt also agreed to take [place] sustenna and received the first shot on Monday  Was encouraged to participate in group and other milieu activity  Patient started to feel better with this combination of treatment. Significant progress in the symptoms since admission. Mood better, with the score of 2/10 - bad  No AVH or paranoid thoughts  No Hopeless or worthless feeling  No active SI/HI  Appetite:  [x] Normal  [] Increased  [] Decreased    Sleep:       [x] Normal  [] Fair       [] Poor            Energy:    [x] Normal  [] Increased  [] Decreased     SI [] Present  [x] Absent  HI  []Present  [x] Absent   Aggression:  [] yes  [] no  Patient is [x] able  [] unable to CONTRACT FOR SAFETY   Medication side effects(SE):  [x] None(Psych.  Meds.) [] Other    Family is applying for guardianship    Mental Status Examination on discharge:    Level of consciousness:  within normal limits   Appearance:  well-appearing  Behavior/Motor:  no abnormalities noted  Attitude toward examiner:  attentive and good eye contact  Speech:  spontaneous, normal rate and normal volume   Mood: euthymic  Affect:  mood congruent  Thought processes:  goal directed   Thought content:  Suicidal Ideation:  denies suicidal ideation  Delusions:  no evidence of delusions  Perceptual Disturbance:  denies any perceptual disturbance  Cognition:  oriented to person, place, and time   Concentration intact  Memory intact  Insight good   Judgement fair   Fund of Knowledge adequate      ASSESSMENT:  Patient symptoms are:  [x] Well controlled  [x] Improving  [] Worsening  [] No change      Diagnosis:  Principal Problem:    Bipolar disorder, curr episode mixed, severe, w/o psychotic features ([facility] [address]  Resolved Problems:    * No resolved hospital problems. *      LABS:    No results for input(s): WBC, HGB, PLT in the last 72 hours. No results for input(s): NA, K, CL, CO2, BUN, CREATININE, GLUCOSE in the last 72 hours. No results for input(s): BILITOT, ALKPHOS, AST, ALT in the last 72 hours. Lab Results   Component Value Date    LABAMPH Neg 09/17/2021    BARBSCNU Neg 09/17/2021    LABBENZ POSITIVE 09/17/2021    LABMETH Neg 07/19/2021    OPIATESCREENURINE Neg 09/17/2021    PHENCYCLIDINESCREENURINE Neg 09/17/2021    ETOH <10 09/17/2021     Lab Results   Component Value Date    TSH 2.060 07/19/2021     No results found for: LITHIUM  Lab Results   Component Value Date    VALPROATE 22.6 (L) 09/23/2021       RISK ASSESSMENT AT DISCHARGE: Low risk for suicide and homicide. Treatment Plan:  Reviewed current Medications with the patient. Education provided on the complaince with treatment.     Advised her not to use alcohol    OP psychiatrist at Dr Darrell Valentino and NP office informed about the benzo addiction and not to prescibe those meds    Risks, benefits, side effects, drug-to-drug interactions and alternatives to treatment were discussed. Encourage patient to attend outpatient follow up appointment and therapy. Patient was advised to call the outpatient provider, visit the nearest ED or call 911 if symptoms are not manageable. Patient's family member was contacted prior to the discharge. Medication List      START taking these medications    divalproex 500 MG DR tablet  Commonly known as: DEPAKOTE  Take 1 tablet by mouth every 12 hours     paliperidone 3 MG extended release tablet  Commonly known as: INVEGA  Take 1 tablet by mouth daily  Start taking on: September 29, 2021     * paliperidone palmitate  MG/ML Candelaria IM injection  Commonly known as: INVEGA SUSTENNA  Inject 156 mg into the muscle once for 1 dose  Start taking on: October 4, 2021     * paliperidone palmitate  MG/0.75ML Candelaria IM injection  Commonly known as: INVEGA SUSTENNA  Inject 117 mg into the muscle every 30 days  Start taking on: October 22, 2021     traZODone 50 MG tablet  Commonly known as: DESYREL  Take 1 tablet by mouth nightly as needed for Sleep         * This list has 2 medication(s) that are the same as other medications prescribed for you. Read the directions carefully, and ask your doctor or other care provider to review them with you.             CHANGE how you take these medications    levothyroxine 50 MCG tablet  Commonly known as: SYNTHROID  Take 1 tablet by mouth Daily  Start taking on: September 29, 2021  What changed:   · medication strength  · how much to take        CONTINUE taking these medications    anastrozole 1 MG tablet  Commonly known as: ARIMIDEX  TAKE 1 TABLET EVERY DAY     triamterene-hydroCHLOROthiazide 37.5-25 MG per tablet  Commonly known as: MAXZIDE-25     vitamin D 50 MCG (2000 UT) Tabs tablet  Commonly known as: CHOLECALCIFEROL  Take 1 tablet by mouth daily        STOP taking these medications    gabapentin 300 MG capsule  Commonly known as: NEURONTIN     LORazepam 1 MG tablet  Commonly known as:  ATIVAN     lurasidone 40 MG Tabs tablet  Commonly known as: LATUDA     SUMAtriptan 100 MG tablet  Commonly known as: IMITREX     TUMS PO           Where to Get Your Medications      These medications were sent to [facility] [phone]  [address]    Phone: [phone]   · divalproex 500 MG DR tablet  · levothyroxine 50 MCG tablet  · paliperidone 3 MG extended release tablet  · traZODone 50 MG tablet     You can get these medications from any pharmacy    Bring a paper prescription for each of these medications  · paliperidone palmitate  MG/0.75ML Candelaria IM injection  · paliperidone palmitate  MG/ML Candelaria IM injection           Reason for more than one antipsychotic:   [x] N/A  [] 3 failed monotherapy(drugs tried):  [] Cross over to a new antipsychotic  [] Taper to monotherapy from polypharmacy  [] Augmentation of Clozapine therapy due to treatment resistance to single therapy        TIME SPEND - 35 MINUTES TO COMPLETE THE EVALUATION, DISCHARGE SUMMARY, MEDICATION RECONCILIATION AND FOLLOW UP CARE     Signed:  Phan Mathews MD  9/28/2021  9:44 AM

## 2021-09-28 NOTE — PROGRESS NOTES
Explained and gave am meds, pt was agreeable aware of Depakote being a tablet now, had maisha send by pharmacy, pt reports pain is gone from tylenol that was given this am, offered vistaril for generalized anxiety of going home, no number given,

## 2021-09-28 NOTE — CARE COORDINATION
Contacted out patient providers regarding her stay here and Dr. Jennifer Thornton recommendations- per [facility] psychiatry she has already been dismissed from their service. Per [address]  provider-- she had only been seen once and there will be a note stating that her current provider will be the [facility].

## 2021-09-28 NOTE — PROGRESS NOTES
Pt. refused to attend the 1000 skills group, despite staff encouragement. Electronically signed by Chata Irene, [address] on 9/28/2021 at 12:15 PM

## 2021-09-28 NOTE — FLOWSHEET NOTE
Reviewed discharge instructions with patient. Pt. Verbalized understanding. Denies SI/HI/AVH. Ambulatory off unit with .

## 2021-09-28 NOTE — GROUP NOTE
Group Therapy Note    Date: 9/27/2021    Group Start Time: 2100  Group End Time: 2115  Group Topic: Wrap-Up    [facility]    Benita Bauer        Group Therapy Note    Attendees: 8/17         Patient's Goal:  \"to go home\"    Notes:  Patient reported not meeting their goal for the day. Patient shared excitement about being discharged tomorrow.     Status After Intervention:  Unchanged    Participation Level: Interactive    Participation Quality: Appropriate, Attentive and Sharing      Speech:  normal      Thought Process/Content: Logical      Affective Functioning: Flat      Mood: euthymic      Level of consciousness:  Alert and Attentive      Response to Learning: Progressing to goal      Endings: None Reported    Modes of Intervention: Support      Discipline Responsible: [employer]      Signature:  Benita Bauer

## 2021-09-28 NOTE — CARE COORDINATION
Left  Voicemail on husbands phone with infusion center appointment and number to call when they arrive for appointment to be directed to the infusion center.  Electronically signed by KARIN Kerns on 9/28/2021 at 1:09 PM

## 2021-09-28 NOTE — GROUP NOTE
Group Therapy Note    Date: 9/28/2021    Group Start Time: 1100  Group End Time: 1130  Group Topic: Psychoeducation    [facility]    JOAN Rogel LSW        Group Therapy Note    Attendees: 10         Patient's Goal:  To participate in a goal oriented group    Notes:  Patient is discharging today. Her goal is to continue outpatient services and use her support system.      Status After Intervention:  Improved    Participation Level: Interactive    Participation Quality: Appropriate      Speech:  normal      Thought Process/Content: Logical      Affective Functioning: Congruent      Mood: calm      Level of consciousness:  Alert      Response to Learning: Able to verbalize current knowledge/experience      Endings: None Reported    Modes of Intervention: Education      Discipline Responsible: /Counselor      Signature:  JOAN Rogel LSW

## 2021-09-28 NOTE — GROUP NOTE
Group Therapy Note    Date: 9/27/2021    Group Start Time: 1900  Group End Time: 1945  Group Topic: Recreational    [facility]    Lashae Blackwood        Group Therapy Note    Attendees: 11/17         Patient's Goal:  To participate in one of the provided activities (movie, coloring, puzzle, cards, board game or socialization). Notes:  Patient chose to watch a movie with peers.     Status After Intervention:  Unchanged    Participation Level: Interactive    Participation Quality: Appropriate and Attentive      Speech:  normal      Thought Process/Content: Logical      Affective Functioning: Congruent      Mood: euthymic      Level of consciousness:  Alert and Attentive      Response to Learning: Progressing to goal      Endings: None Reported    Modes of Intervention: Activity      Discipline Responsible: [address] [place]      Signature:  Lashae Blackwood

## 2021-09-28 NOTE — CARE COORDINATION
Completed family session with pt, her daughter Nicholas Woo,  Annabel Bean, and son. Discussed the followin. Alicja Sewell needs to get the booster - this will be on 10/4 at the infusion center at [facility] , family will need to call for an apptmt time at [phone] ( call this wed or th for 10/4 apptmt )     2. Alicja Sewell will be going to [facility] per MD recommendations     3. Dr. Tremayne Vasques does not recommend pt drinking alcohol with her medications. 4. Pt is not to drive until outpatient psychiatrist deems it appropriate      5. Family will safe guard and provide medications to patient. 6. Pt is not to be placed back on any  Controlled substances. 7. Unit staff have been in contact with pt's current providers regarding her stay here and MD recommendations. 8. All these recommendations are placed in the discharge summary.     9. Medications were sent to [facility] per pt request.     Electronically signed by KARIN Ferrara on 2021 at 10:44 AM

## 2021-10-04 ENCOUNTER — HOSPITAL ENCOUNTER (OUTPATIENT)
Dept: INFUSION THERAPY | Age: 61
Setting detail: INFUSION SERIES
Discharge: HOME | End: 2021-10-04

## 2021-10-04 VITALS
RESPIRATION RATE: 20 BRPM | HEART RATE: 93 BPM | DIASTOLIC BLOOD PRESSURE: 75 MMHG | TEMPERATURE: 97.9 F | SYSTOLIC BLOOD PRESSURE: 158 MMHG

## 2021-10-04 DIAGNOSIS — F31.63 BIPOLAR DISORDER, CURR EPISODE MIXED, SEVERE, W/O PSYCHOTIC FEATURES (HCC): Primary | ICD-10-CM

## 2021-10-04 DIAGNOSIS — F31.13 BIPOLAR DISORDER WITH SEVERE MANIA (HCC): ICD-10-CM

## 2021-10-04 PROCEDURE — 6360000002 HC RX W HCPCS: Performed by: PSYCHIATRY & NEUROLOGY

## 2021-10-04 PROCEDURE — 96372 THER/PROPH/DIAG INJ SC/IM: CPT

## 2021-10-04 RX ADMIN — PALIPERIDONE PALMITATE 156 MG: 156 INJECTION INTRAMUSCULAR at 11:06

## 2021-10-04 ASSESSMENT — PAIN SCALES - GENERAL: PAINLEVEL_OUTOF10: 0

## 2022-10-01 ENCOUNTER — HOSPITAL ENCOUNTER (EMERGENCY)
Age: 62
Discharge: HOME | End: 2022-10-01
Payer: COMMERCIAL

## 2022-10-01 VITALS
WEIGHT: 181 LBS | OXYGEN SATURATION: 96 % | HEIGHT: 67 IN | HEART RATE: 85 BPM | TEMPERATURE: 98.2 F | DIASTOLIC BLOOD PRESSURE: 82 MMHG | RESPIRATION RATE: 18 BRPM | BODY MASS INDEX: 28.41 KG/M2 | SYSTOLIC BLOOD PRESSURE: 150 MMHG

## 2022-10-01 DIAGNOSIS — S05.02XA ABRASION OF LEFT CORNEA, INITIAL ENCOUNTER: Primary | ICD-10-CM

## 2022-10-01 PROCEDURE — 99283 EMERGENCY DEPT VISIT LOW MDM: CPT

## 2022-10-01 PROCEDURE — 6370000000 HC RX 637 (ALT 250 FOR IP): Performed by: NURSE PRACTITIONER

## 2022-10-01 RX ORDER — TRAMADOL HYDROCHLORIDE 50 MG/1
50 TABLET ORAL EVERY 6 HOURS PRN
Qty: 12 TABLET | Refills: 0 | Status: SHIPPED | OUTPATIENT
Start: 2022-10-01 | End: 2022-10-04

## 2022-10-01 RX ORDER — CIPROFLOXACIN HYDROCHLORIDE 3.5 MG/ML
1 SOLUTION/ DROPS TOPICAL
Qty: 5 ML | Refills: 0 | Status: SHIPPED | OUTPATIENT
Start: 2022-10-01 | End: 2022-10-11

## 2022-10-01 RX ORDER — TETRACAINE HYDROCHLORIDE 5 MG/ML
2 SOLUTION OPHTHALMIC ONCE
Status: COMPLETED | OUTPATIENT
Start: 2022-10-01 | End: 2022-10-01

## 2022-10-01 RX ADMIN — TETRACAINE HYDROCHLORIDE 2 DROP: 5 SOLUTION OPHTHALMIC at 14:48

## 2022-10-01 RX ADMIN — FLUORESCEIN SODIUM 1 MG: 1 STRIP OPHTHALMIC at 14:48

## 2022-10-01 ASSESSMENT — PAIN DESCRIPTION - LOCATION: LOCATION: EYE

## 2022-10-01 ASSESSMENT — ENCOUNTER SYMPTOMS
EYE ITCHING: 1
SHORTNESS OF BREATH: 0
VOMITING: 0
COUGH: 0
EYE REDNESS: 1
EYE PAIN: 1
DIARRHEA: 0
ABDOMINAL PAIN: 0
WHEEZING: 0
ABDOMINAL DISTENTION: 0
RHINORRHEA: 0
EYE DISCHARGE: 0
BACK PAIN: 0
TROUBLE SWALLOWING: 0
CONSTIPATION: 0
NAUSEA: 0

## 2022-10-01 ASSESSMENT — VISUAL ACUITY: OU: 1

## 2022-10-01 ASSESSMENT — PAIN DESCRIPTION - ORIENTATION: ORIENTATION: LEFT

## 2022-10-01 ASSESSMENT — PAIN - FUNCTIONAL ASSESSMENT: PAIN_FUNCTIONAL_ASSESSMENT: 0-10

## 2022-10-01 ASSESSMENT — PAIN DESCRIPTION - DESCRIPTORS: DESCRIPTORS: BURNING

## 2022-10-01 NOTE — ED PROVIDER NOTES
[facility] ED  EMERGENCY DEPARTMENT ENCOUNTER      Pt Name: Ute Nicole  MRN: [id number]  [place] 1960  Date of evaluation: 10/1/2022  Provider: Lisandra [place] Tita       Chief Complaint   Patient presents with    Eye Pain         HISTORY OF PRESENT ILLNESS   (Location/Symptom, Timing/Onset, Context/Setting, Quality, Duration, Modifying Factors, Severity)  Note limiting factors. Ute Nicole is a 57 y.o. female with pmhx of anxiety, breast CA, depression, thyroid disease, migraines who presents to the emergency department with c/o moderate, burning, aching L eye pain since last night. Reports she has been rubbing her eyes because they have been itchy due to seasonal allergies. States she also recently dyed her hair. States she is unsure if she scratched her eye or got a chemical burn with hair dye. She has not tried anything to help her symptoms. Denies any other symptoms. Denies CP, SOB, palpitations, HA, n/v/d. HPI    Nursing Notes were reviewed. REVIEW OF SYSTEMS    (2-9 systems for level 4, 10 or more for level 5)     Review of Systems   Constitutional:  Negative for chills, diaphoresis and fever. HENT:  Negative for congestion, rhinorrhea and trouble swallowing. Eyes:  Positive for pain, redness and itching. Negative for discharge and visual disturbance. Respiratory:  Negative for cough, shortness of breath and wheezing. Cardiovascular:  Negative for chest pain, palpitations and leg swelling. Gastrointestinal:  Negative for abdominal distention, abdominal pain, constipation, diarrhea, nausea and vomiting. Endocrine: Negative. Genitourinary:  Negative for difficulty urinating, dysuria, frequency and urgency. Musculoskeletal:  Negative for back pain and gait problem. Skin:  Negative for wound. Neurological:  Negative for dizziness, seizures, syncope, speech difficulty, weakness, numbness and headaches.    Hematological:  Does not bruise/bleed easily. Psychiatric/Behavioral: Negative. Except as noted above the remainder of the review of systems was reviewed and negative.        PAST MEDICAL HISTORY     Past Medical History:   Diagnosis Date    Anxiety     Cancer ([facility] [address] 05/10/2017    DCIS left breast    Depression     Manic depression ([facility]     Migraines     Neuropathy     Thyroid disease          SURGICAL HISTORY       Past Surgical History:   Procedure Laterality Date    BREAST BIOPSY Left 5/23/2017    INJECTION METHYLENE BLUE LEFT BREAST ULTRASOUND GUIDED LEFT BREAST LUMPECTOMY LEFT SENTINEL NODE BIOPSY, 90 MINS, EVICEL 2ML, PAT ON ADMIT  performed by Kalli Moore MD at [place] [address]      t flap     BREAST SURGERY Left 6/27/13    exploration of left nipple with excisonal bx of ducts    BUNIONECTOMY      2006    [address] (CERVIX STATUS UNKNOWN)      HYSTERECTOMY, TOTAL ABDOMINAL (CERVIX REMOVED)      with BSO    MASTECTOMY Left     OK COLON CA SCRN NOT HI RSK IND N/A 9/25/2018    COLONOSCOPY performed by Michelle Roth MD at [address] [facility]       Previous Medications    ANASTROZOLE (ARIMIDEX) 1 MG TABLET    TAKE 1 TABLET EVERY DAY    CHOLECALCIFEROL (VITAMIN D) 2000 UNITS TABS TABLET    Take 1 tablet by mouth daily    DIVALPROEX (DEPAKOTE) 500 MG DR TABLET    Take 1 tablet by mouth every 12 hours    LEVOTHYROXINE (SYNTHROID) 50 MCG TABLET    Take 1 tablet by mouth Daily    PALIPERIDONE (INVEGA) 3 MG EXTENDED RELEASE TABLET    Take 1 tablet by mouth daily    PALIPERIDONE PALMITATE ER (INVEGA SUSTENNA) 117 MG/0.75ML SARA IM INJECTION    Inject 117 mg into the muscle every 30 days    PALIPERIDONE PALMITATE ER (INVEGA SUSTENNA) 156 MG/ML SARA IM INJECTION    Inject 156 mg into the muscle once for 1 dose    TRAZODONE (DESYREL) 50 MG TABLET    Take 1 tablet by mouth nightly as needed for Sleep TRIAMTERENE-HYDROCHLOROTHIAZIDE (MAXZIDE-25) 37.5-25 MG PER TABLET    Take 1 tablet by mouth daily       ALLERGIES     Sulfa antibiotics, Clonidine, Prochlorperazine, and Varenicline    FAMILY HISTORY       Family History   Problem Relation Age of Onset    High Blood Pressure Mother     High Cholesterol Mother     Cancer Father         brain cancer    Stroke Father     Other Father         ulcerative colitis    High Blood Pressure Father     Cancer Sister         uterine cancer    High Blood Pressure Sister     High Blood Pressure Brother           SOCIAL HISTORY       Social History     Socioeconomic History    Marital status:      Spouse name: None    Number of children: None    Years of education: None    Highest education level: None   Tobacco Use    Smoking status: Every Day     Packs/day: 1.00     Years: 13.00     Pack years: 13.00     Types: Cigarettes     Passive exposure: Never    Smokeless tobacco: Never    Tobacco comments:     patient denies   Vaping Use    Vaping Use: Never used   Substance and Sexual Activity    Alcohol use: Yes     Comment: occasional     Drug use: No    Sexual activity: Yes     Partners: Male       SCREENINGS         [place] Coma Scale  Eye Opening: Spontaneous  Best Verbal Response: Oriented  Best Motor Response: Obeys commands  Nessa Coma Scale Score: 15                     CIWA Assessment  BP: (!) 150/82  Heart Rate: 85                 PHYSICAL EXAM    (up to 7 for level 4, 8 or more for level 5)     ED Triage Vitals [10/01/22 1438]   BP Temp Temp Source Heart Rate Resp SpO2 Height Weight   (!) 150/82 98.2 °F (36.8 °C) Oral 85 18 96 % 5' 7\" (1.702 m) 181 lb (82.1 kg)       Physical Exam  Vitals and nursing note reviewed. Constitutional:       General: She is not in acute distress. HENT:      Head: Normocephalic.       Nose: Nose normal.      Mouth/Throat:      Mouth: Mucous membranes are moist.   Eyes:      General: Lids are normal. Lids are everted, no foreign bodies appreciated. Vision grossly intact. Gaze aligned appropriately. No allergic shiner or visual field deficit. Left eye: No foreign body, discharge or hordeolum. Extraocular Movements: Extraocular movements intact. Pupils: Pupils are equal, round, and reactive to light. Neck:      Vascular: No carotid bruit. Cardiovascular:      Rate and Rhythm: Normal rate and regular rhythm. Pulses: Normal pulses. Heart sounds: Normal heart sounds. No murmur heard. No friction rub. No gallop. Pulmonary:      Effort: Pulmonary effort is normal. No respiratory distress. Breath sounds: Normal breath sounds. No stridor. No wheezing, rhonchi or rales. Chest:      Chest wall: No tenderness. Abdominal:      General: Abdomen is flat. Palpations: Abdomen is soft. Musculoskeletal:         General: Normal range of motion. Cervical back: Normal range of motion. Right lower leg: No edema. Left lower leg: No edema. Skin:     General: Skin is warm and dry. Capillary Refill: Capillary refill takes less than 2 seconds. Neurological:      General: No focal deficit present. Mental Status: She is alert and oriented to person, place, and time.    Psychiatric:         Mood and Affect: Mood normal.         Behavior: Behavior normal.       DIAGNOSTIC RESULTS     EKG: All EKG's are interpreted by the Emergency Department Physician who either signs or Co-signs this chart in the absence of a cardiologist.        RADIOLOGY:   Non-plain film images such as CT, Ultrasound and MRI are read by the radiologist. Plain radiographic images are visualized and preliminarily interpreted by the emergency physician with the below findings:        Interpretation per the Radiologist below, if available at the time of this note:    No orders to display         ED BEDSIDE ULTRASOUND:   Performed by ED Physician - none    LABS:  Labs Reviewed - No data to display    All other labs were within normal range or not returned as of this dictation. EMERGENCY DEPARTMENT COURSE and DIFFERENTIAL DIAGNOSIS/MDM:   Vitals:    Vitals:    10/01/22 1438   BP: (!) 150/82   Pulse: 85   Resp: 18   Temp: 98.2 °F (36.8 °C)   TempSrc: Oral   SpO2: 96%   Weight: 181 lb (82.1 kg)   Height: 5' 7\" (1.702 m)         MDM    57 yr old female presents with c/o L eye pain x 1 day. Tetracaine drops applied to L eye. Eye pH tested and is normal. EOM intact. No foreign body visualized. Pt wears contact lenses but does not have them in today due to pain. Fluorescein and black light exam revealed corneal abrasion. Pt reports she is feeling much better after tetracaine drops. States she is scheduled to see her PCP in 2 days for allergy testing. Pt also advised to follow up with her opthalmologist.  Patient is afebrile, hemodynamically stable, and appropriate for outpatient follow up. Patient discharged home in stable condition with no acute distress noted. Patient/family advised to return to ED immediately for any new or worsening symptoms. REASSESSMENT          CRITICAL CARE TIME   Total Critical Care time was  minutes, excluding separately reportable procedures. There was a high probability of clinically significant/life threatening deterioration in the patient's condition which required my urgent intervention. CONSULTS:  None    PROCEDURES:  Unless otherwise noted below, none     Procedures        FINAL IMPRESSION      1.  Abrasion of left cornea, initial encounter          DISPOSITION/PLAN   DISPOSITION Decision To Discharge 10/01/2022 03:02:05 PM      PATIENT REFERRED TO:  DO Khloe Hernandez [phone]    In 2 days      [facility]  [address] [facility] [address]  In 2 days      DISCHARGE MEDICATIONS:  New Prescriptions    CIPROFLOXACIN (CILOXAN) 0.3 % OPHTHALMIC SOLUTION    Place 1 drop into the left eye every 2 hours for 10 days TRAMADOL (ULTRAM) 50 MG TABLET    Take 1 tablet by mouth every 6 hours as needed for Pain for up to 3 days. Controlled Substances Monitoring:     No flowsheet data found.     (Please note that portions of this note were completed with a voice recognition program.  Efforts were made to edit the dictations but occasionally words are mis-transcribed.)    IDALMIS Mcbride CNP (electronically signed)  Attending Emergency Physician           IDALMIS Mcbride CNP  10/01/22 9623

## 2022-10-01 NOTE — ED TRIAGE NOTES
Pt presents to ED with L eye pain since 1300 yesterday. Pt states she dyed her hair and felt that maybe the dye got in her eye. States it feels \"like theres something in there\".

## 2022-10-01 NOTE — ED NOTES
Explained discharge instructions and prescriptions to patient. Went over discharge diagnosis and pertinent educational material with patient. Patient stated understanding of discharge diagnosis, instructions, and prescriptions. Patient denies any questions at this time, all concerns addressed. No signs or symptoms of pain noted at this time. A/0 x3, ambulatory, resps even and unlabored on room air. Follow up instructions and reasons to return to ER reviewed. Patient denies needs at time of discharge.         Sudheer Bar RN  10/01/22 1254

## 2022-10-20 NOTE — PROGRESS NOTES
ACL Reconstruction: Post- Operative Visit Objectives    Review the operative findings, procedures and photos.  Make sure medications are effective and not causing problems.  Naproxen 500 mg for pain and inflammation. You may take one tablet twice a day. This medication will generally be taken the first two weeks.  Other medicines like ibuprofen, aleve, or meloxicam may sometimes be substituted for Naproxen but should not be taken simultaneously.   Keflex. This is an antibiotic to be taken as a prophylactic or preventative medicine once every 6 hours for 1 day. If you have a penicillin allergy this will be substitued.  Oxycodone/hydrocodone for pain. This is a pain reliever that is a combination of a narcotic plus Tylenol. You may take 1 tablet every 6 hours as necessary.  You may also use plain Extra Strength Tylenol, 1 or 2 tablets every 6 hours in place of the prescription as pain subsides.  Aspirin.  Major knee surgery can raise the risk of blood clots in the legs so occasionally this will be prescribed.  Aspirin can help reduce that risk.  This should be taken for two weeks while you are on crutches.  Patients at higher risk for blood clots may be prescribed stronger medications.  Wound Care:  Today we will change your dressings and remove any drains. We will re-dress the incisions with gauze, a waterproof dressing, and an ACE bandage for the first week.  If you continue to bleed you will need to change the gauze and waterproofing, otherwise leave the dressings on without changing and we will removed it next week.    Please keep the incisions as dry as possible.  To shower you will need to remove the ACE bandage.  Do not soak the knee in a bath.  Let the knee dry thoroughly before applying the ACE.   Ensure you are placing a towel on the knee while icing it if the ACE is off.  Exercises and Physical Therapy   Continue the basic exercises 4x/day  Once your sutures are removed, you may begin the stationary bike.   [facility] Neurology consult Note  Name: Renetta Smith  Age: 59 y.o. Gender: female  CodeStatus: Full Code  Allergies: Sulfa Antibiotics  Clonidine  Prochlorperazine  Varenicline    Chief Complaint:Psychiatric Evaluation    Primary Care Provider: Arturo Crespo DO  InpatientTreatment Team: Treatment Team: Attending Provider: Janine Cid MD; Utilization Reviewer: Park Duong RN; Consulting Physician: IDALMIS Acosta - NP; Consulting Physician: Emma Flynn MD; Registered Nurse: Kerrie Bourgeois RN; Registered Nurse: Claire Dale RN; LPN: Kristopher Cai LPN  Admission Date: 7/19/2021      HPI     Patient seen and examined for neurology follow-up for possible frontal temporal dementia. Patient is alert and oriented x3, cooperative, and in no acute distress. Speech remains pressured and patient is quite tangential.  Patient denies any symptoms of sinus thrombosis including headache, visual field deficit, vision changes, nausea, vomiting, seizures, positional headache, or head pressure. Able to follow commands and exam nonfocal.  There has not been any headache no fevers or any other symptoms. Pt recognized me right away and recalls, and recalls I saw her for facial pain. Pain still there but she takes no meds    Vitals:    07/25/21 2032   BP: 116/60   Pulse: 96   Resp:    Temp:    SpO2:      Family History   Problem Relation Age of Onset    High Blood Pressure Mother     High Cholesterol Mother     Cancer Father         brain cancer    Stroke Father     Other Father         ulcerative colitis    High Blood Pressure Father     Cancer Sister         uterine cancer    High Blood Pressure Sister     High Blood Pressure Brother      Social History     Socioeconomic History    Marital status:      Spouse name: Not on file    Number of children: Not on file    Years of education: Not on file    Highest education level: Not on file   Occupational History    Not on file Start at 10 minutes with no resistance and slowly increase the time (by 1-2 minutes).  Once you have reached 30 minutes you may add resistance every few days.  Ultimate goal is to ride continuously for 1 hour per day, 5 days per week with moderate resistance.  Schedule Physical Therapy. We will give you the referral to begin PT immediately.  Crutches  Make sure that you are staying on your crutches for 14 days or more as directed at your office visits.   Follow Up appointments  Schedule Follow up visits in approximately 7-10 days for Suture removal. The next appointment to follow will be at 6 weeks.  Notes etc:  Make sure you have all necessary notes and documentation for school or work.  Issues:  Please ask us or call [phone]     Tobacco Use    Smoking status: Current Every Day Smoker     Packs/day: 1.00     Years: 13.00     Pack years: 13.00     Types: Cigarettes    Smokeless tobacco: Never Used    Tobacco comment: start age 10   Vaping Use    Vaping Use: Never used   Substance and Sexual Activity    Alcohol use: Yes     Comment: occasional     Drug use: No    Sexual activity: Yes   Other Topics Concern    Not on file   Social History Narrative    Not on file     Social Determinants of Health     Financial Resource Strain:     Difficulty of Paying Living Expenses:    Food Insecurity:     Worried About Running Out of Food in the Last Year:     Ran Out of Food in the Last Year:    Transportation Needs:     Lack of Transportation (Medical):  Lack of Transportation (Non-Medical):    Physical Activity:     Days of Exercise per Week:     Minutes of Exercise per Session:    Stress:     Feeling of Stress :    Social Connections:     Frequency of Communication with Friends and Family:     Frequency of Social Gatherings with Friends and Family:     Attends Anabaptism Services:     Active Member of Clubs or Organizations:     Attends Club or Organization Meetings:     Marital Status:    Intimate Partner Violence:     Fear of Current or Ex-Partner:     Emotionally Abused:     Physically Abused:     Sexually Abused:         Review of Systems   Constitutional: Negative for activity change, appetite change, chills, fatigue, fever and unexpected weight change. HENT: Negative for hearing loss and trouble swallowing. Eyes: Negative for visual disturbance. Respiratory: Negative for cough, chest tightness, shortness of breath and wheezing. Cardiovascular: Negative for chest pain, palpitations and leg swelling. Gastrointestinal: Negative for abdominal distention, abdominal pain, nausea and vomiting. Genitourinary: Negative for difficulty urinating and dysuria.    Musculoskeletal: Negative for gait problem, neck pain and neck stiffness. Skin: Negative for color change and rash. Neurological: Negative for dizziness, tremors, seizures, syncope, facial asymmetry, speech difficulty, weakness, light-headedness, numbness and headaches. Psychiatric/Behavioral: Positive for agitation, behavioral problems, confusion, decreased concentration and sleep disturbance. Negative for hallucinations. The patient is nervous/anxious and is hyperactive. Physical Exam  Vitals and nursing note reviewed. Constitutional:       General: She is not in acute distress. Appearance: She is not ill-appearing or diaphoretic. HENT:      Head: Normocephalic and atraumatic. Eyes:      General: No visual field deficit. Extraocular Movements: Extraocular movements intact. Pupils: Pupils are equal, round, and reactive to light. Cardiovascular:      Rate and Rhythm: Normal rate and regular rhythm. Pulmonary:      Effort: Pulmonary effort is normal. No respiratory distress. Breath sounds: Normal breath sounds. Abdominal:      General: Bowel sounds are normal.      Palpations: Abdomen is soft. Skin:     General: Skin is warm and dry. Neurological:      General: No focal deficit present. Mental Status: She is alert and oriented to person, place, and time. Cranial Nerves: No cranial nerve deficit, dysarthria or facial asymmetry. Sensory: No sensory deficit. Motor: No weakness, tremor, atrophy, abnormal muscle tone, seizure activity or pronator drift. Coordination: Romberg sign negative.  Coordination normal. Finger-Nose-Finger Test normal.      Gait: Gait normal.      Deep Tendon Reflexes: Reflexes normal.               Medications:  Reviewed    Infusion Medications:   Scheduled Medications:    lurasidone  20 mg Oral Dinner    Vitamin D  2,000 Units Oral Daily    triamterene-hydroCHLOROthiazide  1 tablet Oral Daily    levothyroxine  50 mcg Oral Daily    anastrozole  1 mg Oral Daily    nicotine  1 patch Transdermal Daily    gabapentin  300 mg Oral TID    LORazepam  2 mg Oral QPM    LORazepam  1 mg Oral QAM    LORazepam  1 mg Oral Lunch     PRN Meds: ibuprofen, SUMAtriptan, haloperidol lactate **OR** haloperidol, hydrOXYzine **OR** hydrOXYzine, acetaminophen, traZODone, benztropine mesylate, magnesium hydroxide    Labs:   No results for input(s): WBC, HGB, HCT, PLT in the last 72 hours. No results for input(s): NA, K, CL, CO2, BUN, CREATININE, CALCIUM, PHOS in the last 72 hours. Invalid input(s): MAGNES  No results for input(s): AST, ALT, BILIDIR, BILITOT, ALKPHOS in the last 72 hours. No results for input(s): INR in the last 72 hours. No results for input(s): Riki Beery in the last 72 hours. Urinalysis:   Lab Results   Component Value Date    NITRU Negative 07/19/2021    WBCUA 20-50 07/19/2021    BACTERIA Negative 07/19/2021    RBCUA 0-2 07/19/2021    BLOODU Negative 07/19/2021    SPECGRAV 1.013 07/19/2021    GLUCOSEU Negative 07/19/2021    GLUCOSEU NEG 04/05/2012       Radiology:   Most recent    EEG No valid procedures specified. MRI of Brain No results found for this or any previous visit. No results found for this or any previous visit. MRA of the Head and Neck: No results found for this or any previous visit. No results found for this or any previous visit. No results found for this or any previous visit. CT of the Head: No results found for this or any previous visit. No results found for this or any previous visit. No results found for this or any previous visit. Carotid duplex: No results found for this or any previous visit. No results found for this or any previous visit. No results found for this or any previous visit. Echo No results found for this or any previous visit. Assessment/Plan:  Bipolar disorder currently in inpatient behavioral health unit. Patient has been initiated on [place]. There is concern from psychiatry for frontotemporal dementia. Will obtain work-up with MRI of the brain and EEG. Will assess B12 and folate and RPR. No metabolic disturbances noted on Laboratory testing. Thyroid studies and normal range. She will need MMSE done tomorrow and further outpatient neuropsychiatric testing. She does report that she recently stopped her Cymbalta approximately 3 months ago and behavior started shortly after that according to family. Further recommendations to follow pending work-up. EEG well-regulated and not suggestive of encephalopathy or epilepsy. MRI of the brain did show very mild global atrophy when compared to previous in 2014, but not suggestive of frontal temporal dementia at this time. Symptoms most likely related to bipolar disorder. MRI did show apparent loss of flow related signal within the right distal IJ, sigmoid, and transverse sinus which may be related to slow flow or sinus thrombosis. Of note, patient is on anastrozole which could increase her risk of venous thrombosis. I will have Dr. Chaka Andujar review the MRI and consider MRV based on his recommendations although patient does not have any signs or symptoms of venous thrombosis. I have personally performed a face to face diagnostic evaluation on this patient, reviewed all data and investigations, and am the sole provider of all clinical decisions on the neurological status of this patient. Vents noted in the above findings are not consistent with venous sinus thrombosis given that her MRI is entirely normal and she does not have a headache or any other symptoms to suggest a venous sinus thrombosis. This is artifactual and one cannot diagnose venous disease with simple MRI of the brain. Her EEG is so entirely normal and very well regulated and therefore underlying neurological dysfunction with a clinical examination of this nature does not suggest a neurological disorder.     Pt recognized me right away and recalls I saw her for left facial pain. No suggestion of dementia,. Carlito Barragan MD, Eduardo Alvarez, American Board of Psychiatry & Neurology  Board Certified in Vascular Neurology  Board Certified in Neuromuscular Medicine  Certified in Neurorehabilitation         Collaborating physicians: Dr Hansel Barragan    Electronically signed by Oliver Pedroza MD on 7/25/2021 at 9:19 PM

## 2023-03-05 ENCOUNTER — HOSPITAL ENCOUNTER (EMERGENCY)
Age: 63
Discharge: HOME | End: 2023-03-05
Attending: EMERGENCY MEDICINE
Payer: MEDICARE

## 2023-03-05 VITALS
DIASTOLIC BLOOD PRESSURE: 77 MMHG | SYSTOLIC BLOOD PRESSURE: 144 MMHG | RESPIRATION RATE: 16 BRPM | HEIGHT: 67 IN | OXYGEN SATURATION: 96 % | TEMPERATURE: 97.3 F | WEIGHT: 178 LBS | BODY MASS INDEX: 27.94 KG/M2 | HEART RATE: 80 BPM

## 2023-03-05 DIAGNOSIS — L03.313 CELLULITIS OF CHEST WALL: ICD-10-CM

## 2023-03-05 DIAGNOSIS — F41.1 ANXIETY STATE: Primary | ICD-10-CM

## 2023-03-05 LAB
ANION GAP SERPL CALCULATED.3IONS-SCNC: 15 MEQ/L (ref 9–15)
BASOPHILS ABSOLUTE: 0 K/UL (ref 0–0.1)
BASOPHILS RELATIVE PERCENT: 0.5 % (ref 0.1–1.2)
BUN BLDV-MCNC: 14 MG/DL (ref 8–23)
CALCIUM SERPL-MCNC: 9.6 MG/DL (ref 8.5–9.9)
CHLORIDE BLD-SCNC: 102 MEQ/L (ref 95–107)
CO2: 21 MEQ/L (ref 20–31)
CREAT SERPL-MCNC: 0.94 MG/DL (ref 0.5–0.9)
EOSINOPHILS ABSOLUTE: 0 K/UL (ref 0–0.4)
EOSINOPHILS RELATIVE PERCENT: 0.2 % (ref 0.7–5.8)
GFR SERPL CREATININE-BSD FRML MDRD: >60 ML/MIN/{1.73_M2}
GLUCOSE BLD-MCNC: 126 MG/DL (ref 70–99)
HCT VFR BLD CALC: 41.5 % (ref 37–47)
HEMOGLOBIN: 13.9 G/DL (ref 11.2–15.7)
IMMATURE GRANULOCYTES #: 0 K/UL
IMMATURE GRANULOCYTES %: 0.2 %
LYMPHOCYTES ABSOLUTE: 0.9 K/UL (ref 1.2–3.7)
LYMPHOCYTES RELATIVE PERCENT: 10.1 %
MCH RBC QN AUTO: 30.5 PG (ref 25.6–32.2)
MCHC RBC AUTO-ENTMCNC: 33.5 % (ref 32.2–35.5)
MCV RBC AUTO: 91.2 FL (ref 79.4–94.8)
MONOCYTES ABSOLUTE: 0.4 K/UL (ref 0.2–0.9)
MONOCYTES RELATIVE PERCENT: 4.7 % (ref 4.7–12.5)
NEUTROPHILS ABSOLUTE: 7.2 K/UL (ref 1.6–6.1)
NEUTROPHILS RELATIVE PERCENT: 84.3 % (ref 34–71.1)
PDW BLD-RTO: 12.7 % (ref 11.7–14.4)
PLATELET # BLD: 203 K/UL (ref 182–369)
PLATELET SLIDE REVIEW: ADEQUATE
POTASSIUM SERPL-SCNC: 4.3 MEQ/L (ref 3.4–4.9)
RBC # BLD: 4.55 M/UL (ref 3.93–5.22)
SODIUM BLD-SCNC: 138 MEQ/L (ref 135–144)
WBC # BLD: 8.5 K/UL (ref 4–10)

## 2023-03-05 PROCEDURE — 80048 BASIC METABOLIC PNL TOTAL CA: CPT

## 2023-03-05 PROCEDURE — 96365 THER/PROPH/DIAG IV INF INIT: CPT

## 2023-03-05 PROCEDURE — 6360000002 HC RX W HCPCS: Performed by: EMERGENCY MEDICINE

## 2023-03-05 PROCEDURE — 2500000003 HC RX 250 WO HCPCS: Performed by: EMERGENCY MEDICINE

## 2023-03-05 PROCEDURE — 99284 EMERGENCY DEPT VISIT MOD MDM: CPT

## 2023-03-05 PROCEDURE — 2580000003 HC RX 258: Performed by: EMERGENCY MEDICINE

## 2023-03-05 PROCEDURE — 36415 COLL VENOUS BLD VENIPUNCTURE: CPT

## 2023-03-05 PROCEDURE — 96375 TX/PRO/DX INJ NEW DRUG ADDON: CPT

## 2023-03-05 PROCEDURE — 85025 COMPLETE CBC W/AUTO DIFF WBC: CPT

## 2023-03-05 RX ORDER — CEPHALEXIN 500 MG/1
500 CAPSULE ORAL 2 TIMES DAILY
Qty: 14 CAPSULE | Refills: 0 | Status: SHIPPED | OUTPATIENT
Start: 2023-03-05 | End: 2023-03-12

## 2023-03-05 RX ORDER — 0.9 % SODIUM CHLORIDE 0.9 %
1000 INTRAVENOUS SOLUTION INTRAVENOUS ONCE
Status: COMPLETED | OUTPATIENT
Start: 2023-03-05 | End: 2023-03-05

## 2023-03-05 RX ORDER — METHYLPREDNISOLONE SODIUM SUCCINATE 125 MG/2ML
125 INJECTION, POWDER, LYOPHILIZED, FOR SOLUTION INTRAMUSCULAR; INTRAVENOUS ONCE
Status: COMPLETED | OUTPATIENT
Start: 2023-03-05 | End: 2023-03-05

## 2023-03-05 RX ORDER — METOCLOPRAMIDE HYDROCHLORIDE 5 MG/ML
10 INJECTION INTRAMUSCULAR; INTRAVENOUS ONCE
Status: COMPLETED | OUTPATIENT
Start: 2023-03-05 | End: 2023-03-05

## 2023-03-05 RX ORDER — ONDANSETRON 2 MG/ML
4 INJECTION INTRAMUSCULAR; INTRAVENOUS ONCE
Status: COMPLETED | OUTPATIENT
Start: 2023-03-05 | End: 2023-03-05

## 2023-03-05 RX ORDER — MORPHINE SULFATE 2 MG/ML
2 INJECTION, SOLUTION INTRAMUSCULAR; INTRAVENOUS ONCE
Status: COMPLETED | OUTPATIENT
Start: 2023-03-05 | End: 2023-03-05

## 2023-03-05 RX ADMIN — ONDANSETRON 4 MG: 2 INJECTION INTRAMUSCULAR; INTRAVENOUS at 09:39

## 2023-03-05 RX ADMIN — SODIUM CHLORIDE, PRESERVATIVE FREE 20 MG: 5 INJECTION INTRAVENOUS at 08:38

## 2023-03-05 RX ADMIN — MORPHINE SULFATE 2 MG: 2 INJECTION, SOLUTION INTRAMUSCULAR; INTRAVENOUS at 09:40

## 2023-03-05 RX ADMIN — SODIUM CHLORIDE 1000 ML: 9 INJECTION, SOLUTION INTRAVENOUS at 08:39

## 2023-03-05 RX ADMIN — METHYLPREDNISOLONE SODIUM SUCCINATE 125 MG: 125 INJECTION, POWDER, FOR SOLUTION INTRAMUSCULAR; INTRAVENOUS at 08:39

## 2023-03-05 RX ADMIN — METOCLOPRAMIDE 10 MG: 5 INJECTION, SOLUTION INTRAMUSCULAR; INTRAVENOUS at 08:41

## 2023-03-05 RX ADMIN — CEFAZOLIN 1000 MG: 1 INJECTION, POWDER, FOR SOLUTION INTRAMUSCULAR; INTRAVENOUS at 08:39

## 2023-03-05 ASSESSMENT — PAIN DESCRIPTION - LOCATION
LOCATION: BREAST
LOCATION: BREAST

## 2023-03-05 ASSESSMENT — ENCOUNTER SYMPTOMS
ABDOMINAL PAIN: 0
SORE THROAT: 0
VOMITING: 0
NAUSEA: 0
COUGH: 0
BACK PAIN: 0
SHORTNESS OF BREATH: 0
EYE DISCHARGE: 0
EYE REDNESS: 0

## 2023-03-05 ASSESSMENT — PAIN DESCRIPTION - ORIENTATION
ORIENTATION: LEFT
ORIENTATION: LEFT

## 2023-03-05 ASSESSMENT — PAIN DESCRIPTION - DESCRIPTORS
DESCRIPTORS: BURNING
DESCRIPTORS: SHARP;BURNING

## 2023-03-05 ASSESSMENT — PAIN DESCRIPTION - FREQUENCY: FREQUENCY: CONTINUOUS

## 2023-03-05 ASSESSMENT — PAIN - FUNCTIONAL ASSESSMENT
PAIN_FUNCTIONAL_ASSESSMENT: 0-10
PAIN_FUNCTIONAL_ASSESSMENT: ACTIVITIES ARE NOT PREVENTED

## 2023-03-05 ASSESSMENT — PAIN DESCRIPTION - PAIN TYPE: TYPE: ACUTE PAIN

## 2023-03-05 ASSESSMENT — PAIN SCALES - GENERAL
PAINLEVEL_OUTOF10: 3
PAINLEVEL_OUTOF10: 5

## 2023-03-05 NOTE — ED PROVIDER NOTES
[address] [place] [address] ED  EMERGENCY DEPARTMENT ENCOUNTER      Pt Name: Juan C Dsouza  MRN: [id number]  [place] 1960  Date of evaluation: 3/5/2023  Provider: Dayna Cooper DO        HISTORY OF PRESENT ILLNESS    Juan C Dsouza is a 70 y.o. female per chart review has ah/o anxiety, cancer, depression, manic depression, migraines, neuropathy and thyroid disease. She presents with rash on her left breast.  She states she recently had covid and thinks it is related. The history is provided by the patient. Rash  Location:  Torso  Torso rash location:  L breast  Quality: dryness, painful, redness and swelling    Pain details:     Quality:  Aching    Severity:  Moderate    Onset quality:  Sudden    Timing:  Constant    Progression:  Unchanged  Severity:  Moderate  Onset quality:  Sudden  Timing:  Constant  Progression:  Worsening  Chronicity:  New  Context: sick contacts    Relieved by:  Nothing  Worsened by:  Heat  Ineffective treatments:  None tried  Associated symptoms: induration and myalgias    Associated symptoms: no abdominal pain, no fever, no nausea, no shortness of breath, no sore throat and not vomiting           REVIEW OF SYSTEMS       Review of Systems   Constitutional:  Negative for chills and fever. HENT:  Negative for ear pain and sore throat. Eyes:  Negative for discharge and redness. Respiratory:  Negative for cough and shortness of breath. Cardiovascular:  Negative for chest pain and palpitations. Gastrointestinal:  Negative for abdominal pain, nausea and vomiting. Genitourinary:  Negative for difficulty urinating and dysuria. Musculoskeletal:  Positive for myalgias. Negative for back pain and neck pain. Skin:  Positive for rash. Negative for wound. Neurological:  Negative for dizziness and syncope. Psychiatric/Behavioral:  Negative for confusion. The patient is not nervous/anxious. All other systems reviewed and are negative.     Except as noted above the remainder of the review of systems was reviewed and negative.        PAST MEDICAL HISTORY     Past Medical History:   Diagnosis Date    Anxiety     Cancer ([facility] [address] 05/10/2017    DCIS left breast    Depression     Manic depression ([facility] [address]     Migraines     Neuropathy     Thyroid disease          SURGICAL HISTORY       Past Surgical History:   Procedure Laterality Date    BREAST BIOPSY Left 5/23/2017    INJECTION METHYLENE BLUE LEFT BREAST ULTRASOUND GUIDED LEFT BREAST LUMPECTOMY LEFT SENTINEL NODE BIOPSY, 90 MINS, EVICEL 2ML, PAT ON ADMIT  performed by Lalita Balbuena MD at [place] [address]      t flap     BREAST SURGERY Left 6/27/13    exploration of left nipple with excisonal bx of ducts    BUNIONECTOMY      2006    [address] (CERVIX STATUS UNKNOWN)      HYSTERECTOMY, TOTAL ABDOMINAL (CERVIX REMOVED)      with BSO    MASTECTOMY Left     NC COLON CA SCRN NOT HI RSK IND N/A 9/25/2018    COLONOSCOPY performed by Janis Morgan MD at [facility]       Discharge Medication List as of 3/5/2023 10:30 AM        CONTINUE these medications which have NOT CHANGED    Details   divalproex (DEPAKOTE) 500 MG DR tablet Take 1 tablet by mouth every 12 hours, Disp-30 tablet, R-3Normal      traZODone (DESYREL) 50 MG tablet Take 1 tablet by mouth nightly as needed for Sleep, Disp-15 tablet, R-2Normal      paliperidone (INVEGA) 3 MG extended release tablet Take 1 tablet by mouth daily, Disp-30 tablet, R-0Normal      paliperidone palmitate ER (INVEGA SUSTENNA) 117 MG/0.75ML SARA IM injection Inject 117 mg into the muscle every 30 days, Disp-1 each, R-1Print      levothyroxine (SYNTHROID) 50 MCG tablet Take 1 tablet by mouth Daily, Disp-30 tablet, R-1Normal      triamterene-hydroCHLOROthiazide (MAXZIDE-25) 37.5-25 MG per tablet Take 1 tablet by mouth dailyHistorical Med      anastrozole (ARIMIDEX) 1 MG tablet TAKE 1 TABLET EVERY DAY, Disp-30 tablet, R-5Normal      Cholecalciferol (VITAMIN D) 2000 units TABS tablet Take 1 tablet by mouth daily, Disp-30 tabletOTC             ALLERGIES     Sulfa antibiotics, Clonidine, Prochlorperazine, and Varenicline    FAMILY HISTORY       Family History   Problem Relation Age of Onset    High Blood Pressure Mother     High Cholesterol Mother     Cancer Father         brain cancer    Stroke Father     Other Father         ulcerative colitis    High Blood Pressure Father     Cancer Sister         uterine cancer    High Blood Pressure Sister     High Blood Pressure Brother           SOCIAL HISTORY       Social History     Socioeconomic History    Marital status:      Spouse name: None    Number of children: None    Years of education: None    Highest education level: None   Tobacco Use    Smoking status: Every Day     Packs/day: 1.00     Years: 13.00     Pack years: 13.00     Types: Cigarettes     Passive exposure: Never    Smokeless tobacco: Never    Tobacco comments:     patient denies   Vaping Use    Vaping Use: Never used   Substance and Sexual Activity    Alcohol use: Yes     Comment: occasional     Drug use: No    Sexual activity: Yes     Partners: Male         PHYSICAL EXAM       ED Triage Vitals [03/05/23 0751]   BP Temp Temp Source Heart Rate Resp SpO2 Height Weight   (!) 151/78 (!) 96 °F (35.6 °C) Temporal 94 17 95 % 5' 7\" (1.702 m) 178 lb (80.7 kg)       Physical Exam  Vitals and nursing note reviewed. Constitutional:       Appearance: Normal appearance. HENT:      Head: Normocephalic and atraumatic. Right Ear: Tympanic membrane normal.      Left Ear: Tympanic membrane normal.      Nose: Nose normal.      Mouth/Throat:      Mouth: Mucous membranes are moist.      Pharynx: Oropharynx is clear. Eyes:      General: Lids are normal.      Extraocular Movements: Extraocular movements intact. Conjunctiva/sclera: Conjunctivae normal.      Pupils: Pupils are equal, round, and reactive to light. Cardiovascular:      Rate and Rhythm: Normal rate and regular rhythm. Pulses: Normal pulses. Heart sounds: Normal heart sounds. Pulmonary:      Effort: Pulmonary effort is normal.      Breath sounds: Normal breath sounds. Abdominal:      General: Abdomen is flat. Bowel sounds are normal.      Palpations: Abdomen is soft. Musculoskeletal:         General: Normal range of motion. Cervical back: Full passive range of motion without pain, normal range of motion and neck supple. Skin:     General: Skin is warm. Capillary Refill: Capillary refill takes less than 2 seconds. Findings: Erythema and rash present. Neurological:      General: No focal deficit present. Mental Status: She is alert and oriented to person, place, and time. Deep Tendon Reflexes: Reflexes are normal and symmetric. Psychiatric:         Attention and Perception: Attention and perception normal.         Mood and Affect: Mood normal.         Behavior: Behavior normal. Behavior is cooperative. LABS:  Labs Reviewed   BASIC METABOLIC PANEL - Abnormal; Notable for the following components:       Result Value    Glucose 126 (*)     Creatinine 0.94 (*)     All other components within normal limits   CBC WITH AUTO DIFFERENTIAL - Abnormal; Notable for the following components:    Neutrophils % 84.3 (*)     Eosinophils % 0.2 (*)     Neutrophils Absolute 7.2 (*)     Lymphocytes Absolute 0.9 (*)     All other components within normal limits         MDM:   Vitals:    Vitals:    03/05/23 0751 03/05/23 1038   BP: (!) 151/78 (!) 144/77   Pulse: 94 80   Resp: 17 16   Temp: (!) 96 °F (35.6 °C) 97.3 °F (36.3 °C)   TempSrc: Temporal    SpO2: 95% 96%   Weight: 178 lb (80.7 kg)    Height: 5' 7\" (1.702 m)        MDM  Number of Diagnoses or Management Options  Anxiety state  Cellulitis of chest wall  Diagnosis management comments: Patient presents with rash on her chest wall and pain in her left breast area.   On exam she has a tender left breast with cellulitis. I ordered labs and gave her IV medication. She will be discharged home with Rx for antibiotics. She will follow up in 2 days with her primary care doctor. Amount and/or Complexity of Data Reviewed  Clinical lab tests: ordered and reviewed         No orders to display           MARÍA TSAI DO     The lab results, radiology and test results were reviewed with the patient and family. The patient will follow up in 2 days with their primary care doctor. If their symptoms change or get worse they will return to the ER. CRITICAL CARE TIME   Total CriticalCare time was 0 minutes, excluding separately reportable procedures. There was a high probability of clinically significant/life threatening deterioration in the patient's condition which required my urgent intervention. PROCEDURES:  Unlessotherwise noted below, none     Procedures      FINAL IMPRESSION      1. Anxiety state    2.  Cellulitis of chest wall          DISPOSITION/PLAN   DISPOSITION Decision To Discharge 03/05/2023 10:26:36 AM          MARÍA Reddy DO (electronically signed)  Attending Emergency Physician          Lila Watkins DO  03/07/23 3104

## 2023-03-07 PROBLEM — F32.A DEPRESSION: Status: ACTIVE | Noted: 2023-03-07

## 2023-03-07 PROBLEM — E03.9 HYPOTHYROIDISM: Status: ACTIVE | Noted: 2023-03-07

## 2023-03-07 PROBLEM — E78.5 HYPERLIPIDEMIA: Status: ACTIVE | Noted: 2023-03-07

## 2023-03-07 PROBLEM — T78.40XA ALLERGIES: Status: ACTIVE | Noted: 2023-03-07

## 2023-03-07 PROBLEM — R53.83 FATIGUE: Status: ACTIVE | Noted: 2023-03-07

## 2023-03-07 PROBLEM — R42 VERTIGO: Status: ACTIVE | Noted: 2023-03-07

## 2023-03-07 PROBLEM — E83.52 HYPERCALCEMIA: Status: ACTIVE | Noted: 2023-03-07

## 2023-03-07 PROBLEM — E66.811 CLASS 1 OBESITY DUE TO EXCESS CALORIES WITH BODY MASS INDEX (BMI) OF 30.0 TO 30.9 IN ADULT: Status: ACTIVE | Noted: 2023-03-07

## 2023-03-07 PROBLEM — F31.9 BIPOLAR 1 DISORDER (MULTI): Status: ACTIVE | Noted: 2023-03-07

## 2023-03-07 PROBLEM — R73.02 GLUCOSE INTOLERANCE (IMPAIRED GLUCOSE TOLERANCE): Status: ACTIVE | Noted: 2023-03-07

## 2023-03-07 PROBLEM — G47.00 INSOMNIA: Status: ACTIVE | Noted: 2023-03-07

## 2023-03-07 PROBLEM — M79.2 NERVE PAIN: Status: ACTIVE | Noted: 2023-03-07

## 2023-03-07 PROBLEM — K29.50 CHRONIC GASTRITIS: Status: ACTIVE | Noted: 2023-03-07

## 2023-03-07 PROBLEM — R92.8 ABNORMAL FINDING ON BREAST IMAGING: Status: ACTIVE | Noted: 2023-03-07

## 2023-03-07 PROBLEM — R41.3 MEMORY IMPAIRMENT: Status: ACTIVE | Noted: 2023-03-07

## 2023-03-07 PROBLEM — R41.0 CONFUSION: Status: ACTIVE | Noted: 2023-03-07

## 2023-03-07 PROBLEM — G43.909 MIGRAINE: Status: ACTIVE | Noted: 2023-03-07

## 2023-03-07 PROBLEM — R10.11 RIGHT UPPER QUADRANT ABDOMINAL PAIN: Status: ACTIVE | Noted: 2023-03-07

## 2023-03-07 PROBLEM — H90.3 BILATERAL SENSORINEURAL HEARING LOSS: Status: ACTIVE | Noted: 2023-03-07

## 2023-03-07 PROBLEM — F17.200 TOBACCO USE DISORDER: Status: ACTIVE | Noted: 2023-03-07

## 2023-03-07 PROBLEM — R06.2 WHEEZING: Status: ACTIVE | Noted: 2023-03-07

## 2023-03-07 PROBLEM — F41.9 ANXIETY: Status: ACTIVE | Noted: 2023-03-07

## 2023-03-07 PROBLEM — J41.0 SIMPLE CHRONIC BRONCHITIS (MULTI): Status: ACTIVE | Noted: 2023-03-07

## 2023-03-07 PROBLEM — C50.412 MALIGNANT NEOPLASM OF UPPER-OUTER QUADRANT OF LEFT FEMALE BREAST (MULTI): Status: ACTIVE | Noted: 2023-03-07

## 2023-03-07 PROBLEM — E66.09 CLASS 1 OBESITY DUE TO EXCESS CALORIES WITH BODY MASS INDEX (BMI) OF 30.0 TO 30.9 IN ADULT: Status: ACTIVE | Noted: 2023-03-07

## 2023-03-07 PROBLEM — I10 HTN (HYPERTENSION): Status: ACTIVE | Noted: 2023-03-07

## 2023-03-07 PROBLEM — C50.919 BREAST CANCER (MULTI): Status: ACTIVE | Noted: 2023-03-07

## 2023-03-07 PROBLEM — K42.9 UMBILICAL HERNIA WITHOUT OBSTRUCTION AND WITHOUT GANGRENE: Status: ACTIVE | Noted: 2023-03-07

## 2023-03-07 PROBLEM — E53.8 VITAMIN B 12 DEFICIENCY: Status: ACTIVE | Noted: 2023-03-07

## 2023-03-07 RX ORDER — PHENYLPROPANOLAMINE/CLEMASTINE 75-1.34MG
400 TABLET, EXTENDED RELEASE ORAL 2 TIMES DAILY
COMMUNITY

## 2023-03-07 RX ORDER — SUMATRIPTAN SUCCINATE 100 MG/1
100 TABLET ORAL
COMMUNITY
Start: 2016-07-29 | End: 2023-09-12 | Stop reason: ALTCHOICE

## 2023-03-07 RX ORDER — FLUTICASONE FUROATE, UMECLIDINIUM BROMIDE AND VILANTEROL TRIFENATATE 100; 62.5; 25 UG/1; UG/1; UG/1
1 POWDER RESPIRATORY (INHALATION) DAILY
COMMUNITY
End: 2023-06-13 | Stop reason: ALTCHOICE

## 2023-03-07 RX ORDER — LEVOTHYROXINE SODIUM 50 UG/1
1 TABLET ORAL DAILY
COMMUNITY
End: 2023-06-20 | Stop reason: SDUPTHER

## 2023-03-07 RX ORDER — TRIAMTERENE/HYDROCHLOROTHIAZID 37.5-25 MG
1 TABLET ORAL DAILY
COMMUNITY
Start: 2020-09-01 | End: 2023-06-13 | Stop reason: SDUPTHER

## 2023-03-10 ENCOUNTER — APPOINTMENT (OUTPATIENT)
Dept: PRIMARY CARE | Facility: CLINIC | Age: 63
End: 2023-03-10
Payer: MEDICARE

## 2023-03-15 ENCOUNTER — TELEPHONE (OUTPATIENT)
Dept: PRIMARY CARE | Facility: CLINIC | Age: 63
End: 2023-03-15
Payer: MEDICARE

## 2023-03-27 ENCOUNTER — TELEPHONE (OUTPATIENT)
Dept: PRIMARY CARE | Facility: CLINIC | Age: 63
End: 2023-03-27
Payer: MEDICARE

## 2023-04-25 ENCOUNTER — TELEPHONE (OUTPATIENT)
Dept: PRIMARY CARE | Facility: CLINIC | Age: 63
End: 2023-04-25
Payer: MEDICARE

## 2023-06-12 LAB
ALANINE AMINOTRANSFERASE (SGPT) (U/L) IN SER/PLAS: 15 U/L (ref 7–45)
ALBUMIN (G/DL) IN SER/PLAS: 4.6 G/DL (ref 3.4–5)
ALKALINE PHOSPHATASE (U/L) IN SER/PLAS: 49 U/L (ref 33–136)
ANION GAP IN SER/PLAS: 15 MMOL/L (ref 10–20)
ASPARTATE AMINOTRANSFERASE (SGOT) (U/L) IN SER/PLAS: 16 U/L (ref 9–39)
BASOPHILS (10*3/UL) IN BLOOD BY AUTOMATED COUNT: 0.05 X10E9/L (ref 0–0.1)
BASOPHILS/100 LEUKOCYTES IN BLOOD BY AUTOMATED COUNT: 0.7 % (ref 0–2)
BILIRUBIN TOTAL (MG/DL) IN SER/PLAS: 0.4 MG/DL (ref 0–1.2)
CALCIUM (MG/DL) IN SER/PLAS: 9.9 MG/DL (ref 8.6–10.3)
CARBON DIOXIDE, TOTAL (MMOL/L) IN SER/PLAS: 26 MMOL/L (ref 21–32)
CHLORIDE (MMOL/L) IN SER/PLAS: 105 MMOL/L (ref 98–107)
CHOLESTEROL (MG/DL) IN SER/PLAS: 227 MG/DL (ref 0–199)
CHOLESTEROL IN HDL (MG/DL) IN SER/PLAS: 59.8 MG/DL
CHOLESTEROL/HDL RATIO: 3.8
CREATININE (MG/DL) IN SER/PLAS: 0.91 MG/DL (ref 0.5–1.05)
EOSINOPHILS (10*3/UL) IN BLOOD BY AUTOMATED COUNT: 0.12 X10E9/L (ref 0–0.7)
EOSINOPHILS/100 LEUKOCYTES IN BLOOD BY AUTOMATED COUNT: 1.7 % (ref 0–6)
ERYTHROCYTE DISTRIBUTION WIDTH (RATIO) BY AUTOMATED COUNT: 12.8 % (ref 11.5–14.5)
ERYTHROCYTE MEAN CORPUSCULAR HEMOGLOBIN CONCENTRATION (G/DL) BY AUTOMATED: 32.4 G/DL (ref 32–36)
ERYTHROCYTE MEAN CORPUSCULAR VOLUME (FL) BY AUTOMATED COUNT: 95 FL (ref 80–100)
ERYTHROCYTES (10*6/UL) IN BLOOD BY AUTOMATED COUNT: 4.67 X10E12/L (ref 4–5.2)
GFR FEMALE: 70 ML/MIN/1.73M2
GLUCOSE (MG/DL) IN SER/PLAS: 99 MG/DL (ref 74–99)
HEMATOCRIT (%) IN BLOOD BY AUTOMATED COUNT: 44.2 % (ref 36–46)
HEMOGLOBIN (G/DL) IN BLOOD: 14.3 G/DL (ref 12–16)
IMMATURE GRANULOCYTES/100 LEUKOCYTES IN BLOOD BY AUTOMATED COUNT: 0.1 % (ref 0–0.9)
LDL: 138 MG/DL (ref 0–99)
LEUKOCYTES (10*3/UL) IN BLOOD BY AUTOMATED COUNT: 7.2 X10E9/L (ref 4.4–11.3)
LYMPHOCYTES (10*3/UL) IN BLOOD BY AUTOMATED COUNT: 2.35 X10E9/L (ref 1.2–4.8)
LYMPHOCYTES/100 LEUKOCYTES IN BLOOD BY AUTOMATED COUNT: 32.6 % (ref 13–44)
MAGNESIUM (MG/DL) IN SER/PLAS: 1.92 MG/DL (ref 1.6–2.4)
MONOCYTES (10*3/UL) IN BLOOD BY AUTOMATED COUNT: 0.37 X10E9/L (ref 0.1–1)
MONOCYTES/100 LEUKOCYTES IN BLOOD BY AUTOMATED COUNT: 5.1 % (ref 2–10)
NEUTROPHILS (10*3/UL) IN BLOOD BY AUTOMATED COUNT: 4.3 X10E9/L (ref 1.2–7.7)
NEUTROPHILS/100 LEUKOCYTES IN BLOOD BY AUTOMATED COUNT: 59.8 % (ref 40–80)
PLATELETS (10*3/UL) IN BLOOD AUTOMATED COUNT: 222 X10E9/L (ref 150–450)
POTASSIUM (MMOL/L) IN SER/PLAS: 4.8 MMOL/L (ref 3.5–5.3)
PROTEIN TOTAL: 7.1 G/DL (ref 6.4–8.2)
SODIUM (MMOL/L) IN SER/PLAS: 141 MMOL/L (ref 136–145)
THYROTROPIN (MIU/L) IN SER/PLAS BY DETECTION LIMIT <= 0.05 MIU/L: 2.15 MIU/L (ref 0.44–3.98)
THYROXINE (T4) FREE (NG/DL) IN SER/PLAS: 0.91 NG/DL (ref 0.61–1.12)
TRIGLYCERIDE (MG/DL) IN SER/PLAS: 144 MG/DL (ref 0–149)
UREA NITROGEN (MG/DL) IN SER/PLAS: 14 MG/DL (ref 6–23)
VLDL: 29 MG/DL (ref 0–40)

## 2023-06-13 ENCOUNTER — OFFICE VISIT (OUTPATIENT)
Dept: PRIMARY CARE | Facility: CLINIC | Age: 63
End: 2023-06-13
Payer: MEDICARE

## 2023-06-13 VITALS
BODY MASS INDEX: 27.16 KG/M2 | DIASTOLIC BLOOD PRESSURE: 76 MMHG | WEIGHT: 169 LBS | TEMPERATURE: 97.7 F | HEART RATE: 103 BPM | SYSTOLIC BLOOD PRESSURE: 121 MMHG | HEIGHT: 66 IN | RESPIRATION RATE: 18 BRPM | OXYGEN SATURATION: 95 %

## 2023-06-13 DIAGNOSIS — R35.0 FREQUENT URINATION: ICD-10-CM

## 2023-06-13 DIAGNOSIS — E03.9 ACQUIRED HYPOTHYROIDISM: ICD-10-CM

## 2023-06-13 DIAGNOSIS — E78.2 MIXED HYPERLIPIDEMIA: ICD-10-CM

## 2023-06-13 DIAGNOSIS — M54.50 LOW BACK PAIN WITHOUT SCIATICA, UNSPECIFIED BACK PAIN LATERALITY, UNSPECIFIED CHRONICITY: ICD-10-CM

## 2023-06-13 DIAGNOSIS — Z00.00 ROUTINE GENERAL MEDICAL EXAMINATION AT HEALTH CARE FACILITY: Primary | ICD-10-CM

## 2023-06-13 DIAGNOSIS — I10 PRIMARY HYPERTENSION: ICD-10-CM

## 2023-06-13 LAB
POC APPEARANCE, URINE: CLEAR
POC BILIRUBIN, URINE: NEGATIVE
POC BLOOD, URINE: ABNORMAL
POC COLOR, URINE: YELLOW
POC GLUCOSE, URINE: NEGATIVE MG/DL
POC KETONES, URINE: NEGATIVE MG/DL
POC LEUKOCYTES, URINE: ABNORMAL
POC NITRITE,URINE: NEGATIVE
POC PH, URINE: 6.5 PH
POC PROTEIN, URINE: NEGATIVE MG/DL
POC SPECIFIC GRAVITY, URINE: 1.02
POC UROBILINOGEN, URINE: 0.2 EU/DL

## 2023-06-13 PROCEDURE — 81003 URINALYSIS AUTO W/O SCOPE: CPT | Performed by: FAMILY MEDICINE

## 2023-06-13 PROCEDURE — 3074F SYST BP LT 130 MM HG: CPT | Performed by: FAMILY MEDICINE

## 2023-06-13 PROCEDURE — G0438 PPPS, INITIAL VISIT: HCPCS | Performed by: FAMILY MEDICINE

## 2023-06-13 PROCEDURE — 1170F FXNL STATUS ASSESSED: CPT | Performed by: FAMILY MEDICINE

## 2023-06-13 PROCEDURE — 3078F DIAST BP <80 MM HG: CPT | Performed by: FAMILY MEDICINE

## 2023-06-13 PROCEDURE — 99214 OFFICE O/P EST MOD 30 MIN: CPT | Performed by: FAMILY MEDICINE

## 2023-06-13 PROCEDURE — 87077 CULTURE AEROBIC IDENTIFY: CPT

## 2023-06-13 PROCEDURE — 87186 SC STD MICRODIL/AGAR DIL: CPT

## 2023-06-13 PROCEDURE — 1157F ADVNC CARE PLAN IN RCRD: CPT | Performed by: FAMILY MEDICINE

## 2023-06-13 PROCEDURE — 1159F MED LIST DOCD IN RCRD: CPT | Performed by: FAMILY MEDICINE

## 2023-06-13 PROCEDURE — 87086 URINE CULTURE/COLONY COUNT: CPT

## 2023-06-13 RX ORDER — TRIAMTERENE/HYDROCHLOROTHIAZID 37.5-25 MG
1 TABLET ORAL DAILY
Qty: 90 TABLET | Refills: 3 | Status: SHIPPED | OUTPATIENT
Start: 2023-06-13 | End: 2023-09-12 | Stop reason: SDUPTHER

## 2023-06-13 RX ORDER — TRIAMTERENE/HYDROCHLOROTHIAZID 37.5-25 MG
1 TABLET ORAL DAILY
Qty: 90 TABLET | Refills: 1 | Status: CANCELLED | OUTPATIENT
Start: 2023-06-13 | End: 2023-09-11

## 2023-06-13 RX ORDER — NITROFURANTOIN 25; 75 MG/1; MG/1
100 CAPSULE ORAL 2 TIMES DAILY
Qty: 20 CAPSULE | Refills: 0 | Status: SHIPPED | OUTPATIENT
Start: 2023-06-13 | End: 2023-06-23

## 2023-06-13 ASSESSMENT — ENCOUNTER SYMPTOMS
SHORTNESS OF BREATH: 0
BRUISES/BLEEDS EASILY: 0
COUGH: 0
LIGHT-HEADEDNESS: 0
EYE REDNESS: 0
FEVER: 0
ADENOPATHY: 0
NUMBNESS: 0
CONFUSION: 0
CHILLS: 0
DEPRESSION: 0
MYALGIAS: 0
CHOKING: 0
DIARRHEA: 0
PARESIS: 0
PARESTHESIAS: 0
DYSPHORIC MOOD: 0
VOMITING: 0
ABDOMINAL PAIN: 0
SEIZURES: 0
NECK STIFFNESS: 0
DIZZINESS: 0
SPEECH DIFFICULTY: 0
ABDOMINAL DISTENTION: 0
TROUBLE SWALLOWING: 0
HALLUCINATIONS: 0
DYSURIA: 0
BACK PAIN: 1
ARTHRALGIAS: 0
DIAPHORESIS: 0
SLEEP DISTURBANCE: 0
UNEXPECTED WEIGHT CHANGE: 0
BOWEL INCONTINENCE: 0
EYE ITCHING: 0
AGITATION: 0
FATIGUE: 1
VOICE CHANGE: 0
POLYDIPSIA: 0
PALPITATIONS: 0
FACIAL ASYMMETRY: 0
RHINORRHEA: 0
HEMATURIA: 0
EYE PAIN: 0
WEAKNESS: 0
EYE DISCHARGE: 0
CHEST TIGHTNESS: 0
TREMORS: 0
NAUSEA: 0
SINUS PRESSURE: 0
ACTIVITY CHANGE: 0
HYPERTENSION: 1
OCCASIONAL FEELINGS OF UNSTEADINESS: 0
WOUND: 0
CONSTIPATION: 0
TINGLING: 0
HEADACHES: 0
SORE THROAT: 0
BLOOD IN STOOL: 0
PHOTOPHOBIA: 0
LOSS OF SENSATION IN FEET: 0
APPETITE CHANGE: 0
NECK PAIN: 0
FLANK PAIN: 0
WHEEZING: 0
JOINT SWELLING: 0
FREQUENCY: 1
NERVOUS/ANXIOUS: 0

## 2023-06-13 ASSESSMENT — PATIENT HEALTH QUESTIONNAIRE - PHQ9
1. LITTLE INTEREST OR PLEASURE IN DOING THINGS: NOT AT ALL
2. FEELING DOWN, DEPRESSED OR HOPELESS: NOT AT ALL
2. FEELING DOWN, DEPRESSED OR HOPELESS: NOT AT ALL
6. FEELING BAD ABOUT YOURSELF - OR THAT YOU ARE A FAILURE OR HAVE LET YOURSELF OR YOUR FAMILY DOWN: NOT AT ALL
3. TROUBLE FALLING OR STAYING ASLEEP OR SLEEPING TOO MUCH: NEARLY EVERY DAY
SUM OF ALL RESPONSES TO PHQ9 QUESTIONS 1 AND 2: 0
SUM OF ALL RESPONSES TO PHQ9 QUESTIONS 1 AND 2: 0
3. TROUBLE FALLING OR STAYING ASLEEP OR SLEEPING TOO MUCH: NEARLY EVERY DAY
1. LITTLE INTEREST OR PLEASURE IN DOING THINGS: NOT AT ALL
2. FEELING DOWN, DEPRESSED OR HOPELESS: NOT AT ALL
1. LITTLE INTEREST OR PLEASURE IN DOING THINGS: NOT AT ALL
SUM OF ALL RESPONSES TO PHQ9 QUESTIONS 1 AND 2: 0
5. POOR APPETITE OR OVEREATING: MORE THAN HALF THE DAYS
4. FEELING TIRED OR HAVING LITTLE ENERGY: SEVERAL DAYS

## 2023-06-13 ASSESSMENT — ANXIETY QUESTIONNAIRES
IF YOU CHECKED OFF ANY PROBLEMS ON THIS QUESTIONNAIRE, HOW DIFFICULT HAVE THESE PROBLEMS MADE IT FOR YOU TO DO YOUR WORK, TAKE CARE OF THINGS AT HOME, OR GET ALONG WITH OTHER PEOPLE: NOT DIFFICULT AT ALL
5. BEING SO RESTLESS THAT IT IS HARD TO SIT STILL: NOT AT ALL
2. NOT BEING ABLE TO STOP OR CONTROL WORRYING: SEVERAL DAYS
6. BECOMING EASILY ANNOYED OR IRRITABLE: NOT AT ALL
1. FEELING NERVOUS, ANXIOUS, OR ON EDGE: NOT AT ALL
GAD7 TOTAL SCORE: 3
7. FEELING AFRAID AS IF SOMETHING AWFUL MIGHT HAPPEN: NOT AT ALL
3. WORRYING TOO MUCH ABOUT DIFFERENT THINGS: SEVERAL DAYS
4. TROUBLE RELAXING: SEVERAL DAYS

## 2023-06-13 ASSESSMENT — ACTIVITIES OF DAILY LIVING (ADL)
BATHING: INDEPENDENT
MANAGING_FINANCES: INDEPENDENT
GROCERY_SHOPPING: INDEPENDENT
TAKING_MEDICATION: INDEPENDENT
DOING_HOUSEWORK: INDEPENDENT
DRESSING: INDEPENDENT

## 2023-06-13 NOTE — LETTER
Jeniffer 15, 2023     Adam Wells  [address] [facility] [address]      Dear Ms. Wells:    Below are the results from your recent visit:  No x-ray findings that require immediate attention. We will review at next visit. If you have concerns that you desire to address sooner than next regular visit, please schedule appointment.   Maintain physical therapy as recommended.   Resulted Orders   XR lumbar spine complete 4+ views    Narrative    Interpreted By:  ARIAN RUDOLPH MD  MRN: [id number]  Patient Name: ADAM WELLS     STUDY:  SPINE, LUMBOSACRAL  MIN 4 VIEWS;  6/13/2023 12:37 pm     INDICATION:  chronic back pain.     COMPARISON:  None.     ACCESSION NUMBER(S):  [id number]     ORDERING CLINICIAN:  DIMA PADILLA     FINDINGS:  Lumbar spine, 6 views     There is severe disc space narrowing and osteophytosis at L4-L5 and  L5-S1 with severe facet disease at this level as well. No fracture or  dislocation seen.       Impression    Severe spondylosis and facet disease in the lower lumbar spine at  L4-L5 and L5-S1   XR thoracic spine 3 views    Narrative    Interpreted By:  ARIAN RUDOLPH MD  MRN: [id number]  Patient Name: ADAM WELLS     STUDY:  SPINE, THORACIC, 3 VIEWS;  6/13/2023 12:38 pm     INDICATION:  chronic back pain.     COMPARISON:  None.     ACCESSION NUMBER(S):  [id number]     ORDERING CLINICIAN:  DIMA PADILLA     FINDINGS:  Thoracic spine, four views     Mild osteophytosis in the midthoracic spine without disc space  narrowing. There is no fracture there is no spondylolisthesis. Mild  to moderate spondylosis in the lower cervical spine as well       Impression    Mild spondylotic changes with osteophytosis in the midthoracic spine

## 2023-06-13 NOTE — PROGRESS NOTES
Subjective   Patient ID: Marianna Crum is a 63 y.o. female who presents for Medicare Annual Wellness Visit Initial (And follow up labs ), Urinary Frequency (X 1 year ), and Back Pain.    Hypertension  This is a chronic problem. The current episode started more than 1 year ago. The problem is unchanged. The problem is controlled. Pertinent negatives include no chest pain, headaches, neck pain, palpitations or shortness of breath. Agents associated with hypertension include thyroid hormones. Past treatments include diuretics. The current treatment provides significant improvement. There are no compliance problems.  Identifiable causes of hypertension include a thyroid problem. There is no history of chronic renal disease.   Back Pain  This is a chronic problem. The current episode started more than 1 month ago. The problem occurs constantly. The pain is present in the lumbar spine. The quality of the pain is described as shooting and stabbing. The pain radiates to the left foot and right foot. The pain is at a severity of 7/10. The pain is Worse during the day. The symptoms are aggravated by standing, twisting and stress. Pertinent negatives include no abdominal pain, bladder incontinence, bowel incontinence, chest pain, dysuria, fever, headaches, numbness, paresis, paresthesias, tingling or weakness. Risk factors include menopause and history of cancer. Treatments tried: rest intermittent. The treatment provided mild relief.   Urinary Frequency   This is a new problem. The current episode started more than 1 month ago. The problem occurs every urination. The problem has been waxing and waning. Associated symptoms include frequency and urgency. Pertinent negatives include no chills, flank pain, hematuria, nausea or vomiting. She has tried nothing for the symptoms. Her past medical history is significant for recurrent UTIs.   Thyroid Problem  Symptoms include fatigue. Patient reports no anxiety, cold intolerance,  constipation, diaphoresis, diarrhea, heat intolerance, palpitations or tremors. Her past medical history is significant for hyperlipidemia. There is no history of diabetes.   Hyperlipidemia  This is a chronic problem. Recent lipid tests were reviewed and are high. Exacerbating diseases include hypothyroidism. She has no history of chronic renal disease, diabetes, liver disease, obesity or nephrotic syndrome. Factors aggravating her hyperlipidemia include thiazides. Pertinent negatives include no chest pain, myalgias or shortness of breath. Current antihyperlipidemic treatment includes diet change and exercise. The current treatment provides mild improvement of lipids. There are no compliance problems.  Risk factors for coronary artery disease include dyslipidemia, hypertension and post-menopausal.        Review of Systems   Constitutional:  Positive for fatigue. Negative for activity change, appetite change, chills, diaphoresis, fever and unexpected weight change.   HENT:  Negative for congestion, ear pain, hearing loss, nosebleeds, postnasal drip, rhinorrhea, sinus pressure, sneezing, sore throat, tinnitus, trouble swallowing and voice change.    Eyes:  Negative for photophobia, pain, discharge, redness, itching and visual disturbance.   Respiratory:  Negative for cough, choking, chest tightness, shortness of breath and wheezing.    Cardiovascular:  Negative for chest pain, palpitations and leg swelling.   Gastrointestinal:  Negative for abdominal distention, abdominal pain, blood in stool, bowel incontinence, constipation, diarrhea, nausea and vomiting.   Endocrine: Negative for cold intolerance, heat intolerance, polydipsia and polyuria.   Genitourinary:  Positive for frequency and urgency. Negative for bladder incontinence, dysuria, flank pain and hematuria.   Musculoskeletal:  Positive for back pain. Negative for arthralgias, joint swelling, myalgias, neck pain and neck stiffness.   Skin:  Negative for rash and  "wound.   Allergic/Immunologic: Negative for immunocompromised state.   Neurological:  Negative for dizziness, tingling, tremors, seizures, syncope, facial asymmetry, speech difficulty, weakness, light-headedness, numbness, headaches and paresthesias.   Hematological:  Negative for adenopathy. Does not bruise/bleed easily.   Psychiatric/Behavioral:  Negative for agitation, behavioral problems, confusion, dysphoric mood, hallucinations, self-injury, sleep disturbance and suicidal ideas. The patient is not nervous/anxious.        Objective   /76 (BP Location: Right arm, Patient Position: Sitting, BP Cuff Size: Large adult)   Pulse 103   Temp 36.5 °C (97.7 °F) (Temporal)   Resp 18   Ht 1.676 m (5' 6\")   Wt 76.7 kg (169 lb)   SpO2 95%   BMI 27.28 kg/m²     Physical Exam  Constitutional:       General: She is not in acute distress.     Appearance: She is not ill-appearing or diaphoretic.   HENT:      Head: Normocephalic and atraumatic.      Right Ear: External ear normal.      Left Ear: External ear normal.      Nose: Nose normal. No rhinorrhea.   Eyes:      General: Lids are normal. No scleral icterus.        Right eye: No discharge.         Left eye: No discharge.      Conjunctiva/sclera: Conjunctivae normal.   Cardiovascular:      Rate and Rhythm: Normal rate and regular rhythm.      Pulses: Normal pulses.      Heart sounds: No murmur heard.  Pulmonary:      Effort: Pulmonary effort is normal. No respiratory distress.      Breath sounds: No decreased breath sounds, wheezing, rhonchi or rales.   Abdominal:      General: Bowel sounds are normal. There is no distension.      Palpations: Abdomen is soft. There is no mass.      Tenderness: There is no abdominal tenderness. There is no guarding or rebound.   Musculoskeletal:         General: Tenderness present. No swelling or deformity.      Cervical back: No rigidity or tenderness.      Right lower leg: No edema.      Left lower leg: No edema. "   Lymphadenopathy:      Cervical: No cervical adenopathy.      Upper Body:      Right upper body: No supraclavicular adenopathy.      Left upper body: No supraclavicular adenopathy.   Skin:     General: Skin is warm and dry.      Coloration: Skin is not jaundiced or pale.      Findings: No erythema, lesion or rash.   Neurological:      General: No focal deficit present.      Mental Status: She is alert and oriented to person, place, and time.      Sensory: No sensory deficit.      Motor: No weakness or tremor.      Coordination: Coordination normal.      Gait: Gait normal.   Psychiatric:         Mood and Affect: Mood normal. Affect is not inappropriate.         Behavior: Behavior normal.         Assessment/Plan   Diagnoses and all orders for this visit:  Routine general medical examination at health care facility  -     Follow Up In [facility] - PCP; Future  Frequent urination  -     POCT UA Automated manually resulted  -     Urine Culture  -     nitrofurantoin, macrocrystal-monohydrate, (Macrobid) 100 mg capsule; Take 1 capsule (100 mg) by mouth 2 times a day for 10 days.  -     Follow Up In [facility] - PCP; Future  Low back pain without sciatica, unspecified back pain laterality, unspecified chronicity  -     POCT UA Automated manually resulted  -     Urine Culture  -     XR lumbar spine complete 4+ views; Future  -     Referral to Physical Therapy; Future  -     Follow Up In [facility] - PCP; Future  Primary hypertension  -     triamterene-hydrochlorothiazid (Maxzide-25) 37.5-25 mg tablet; Take 1 tablet by mouth once daily.  -     CBC and Auto Differential; Future  -     Comprehensive Metabolic Panel; Future  -     Lipid Panel; Future  -     Magnesium; Future  -     TSH with reflex to Free T4 if abnormal; Future  -     Follow Up In [facility] - PCP; Future  Acquired hypothyroidism  -     Comprehensive Metabolic Panel; Future  -     Lipid Panel; Future  -     TSH with  reflex to Free T4 if abnormal; Future  -     Follow Up In [facility] - PCP; Future  Mixed hyperlipidemia  -     Comprehensive Metabolic Panel; Future  -     Lipid Panel; Future  -     TSH with reflex to Free T4 if abnormal; Future  -     Follow Up In [facility] - PCP; Future  Other orders  -     XR thoracic spine 3 views

## 2023-06-13 NOTE — LETTER
June 16, 2023     Adam Wells  [address] [facility] [address]      Dear Ms. Wells:    Below are the results from your recent visit:  No lab findings that require immediate attention. We will review at next visit. If you have concerns that you desire to address sooner than next regular visit, please schedule appointment.   Finish nitrofurantoin as ordered   Resulted Orders   POCT UA Automated manually resulted   Result Value Ref Range    POC Color, Urine Yellow Straw, Yellow, Light Yellow      Comment:      strong odor- culture sent    POC Appearance, Urine Clear Clear    POC Specific Gravity, Urine 1.025 1.005 - 1.035    POC PH, Urine 6.5 No Reference Range Established PH    POC Protein, Urine NEGATIVE NEGATIVE, 30 (1+) mg/dl    POC Glucose, Urine NEGATIVE NEGATIVE mg/dl    POC Blood, Urine TRACE-Intact (A) NEGATIVE    POC Ketones, Urine NEGATIVE NEGATIVE mg/dl    POC Bilirubin, Urine NEGATIVE NEGATIVE    POC Urobilinogen, Urine 0.2 0.2, 1.0 EU/DL    Poc Nitrate, Urine NEGATIVE NEGATIVE    POC Leukocytes, Urine TRACE (A) NEGATIVE   Urine Culture   Result Value Ref Range    Urine Culture Escherichia coli (A)       Comment:      >100,000 CFU/ML   XR lumbar spine complete 4+ views    Narrative    Interpreted By:  ARIAN RUDOLPH MD  MRN: [id number]  Patient Name: ADAM WELLS     STUDY:  SPINE, LUMBOSACRAL  MIN 4 VIEWS;  6/13/2023 12:37 pm     INDICATION:  chronic back pain.     COMPARISON:  None.     ACCESSION NUMBER(S):  [id number]     ORDERING CLINICIAN:  DIMA PADILLA     FINDINGS:  Lumbar spine, 6 views     There is severe disc space narrowing and osteophytosis at L4-L5 and  L5-S1 with severe facet disease at this level as well. No fracture or  dislocation seen.       Impression    Severe spondylosis and facet disease in the lower lumbar spine at  L4-L5 and L5-S1   XR thoracic spine 3 views    Narrative    Interpreted By:  ARIAN RUDOLPH MD  MRN: [id number]  Patient Name: ADAM WELLS     STUDY:  SPINE, THORACIC, 3 VIEWS;   6/13/2023 12:38 pm     INDICATION:  chronic back pain.     COMPARISON:  None.     ACCESSION NUMBER(S):  [id number]     ORDERING CLINICIAN:  DIMA PADILLA     FINDINGS:  Thoracic spine, four views     Mild osteophytosis in the midthoracic spine without disc space  narrowing. There is no fracture there is no spondylolisthesis. Mild  to moderate spondylosis in the lower cervical spine as well       Impression    Mild spondylotic changes with osteophytosis in the midthoracic spine

## 2023-06-16 LAB — URINE CULTURE: ABNORMAL

## 2023-06-20 DIAGNOSIS — E03.9 HYPOTHYROIDISM, UNSPECIFIED TYPE: ICD-10-CM

## 2023-06-20 RX ORDER — LEVOTHYROXINE SODIUM 50 UG/1
50 TABLET ORAL DAILY
Qty: 90 TABLET | Refills: 1 | Status: SHIPPED | OUTPATIENT
Start: 2023-06-20 | End: 2023-06-29 | Stop reason: SDUPTHER

## 2023-06-28 DIAGNOSIS — E03.9 HYPOTHYROIDISM, UNSPECIFIED TYPE: ICD-10-CM

## 2023-06-28 NOTE — TELEPHONE ENCOUNTER
Patient called stating Chrissie didn't have her prescription for levothyroxine. I called the pharmacy and they stated she picked up her levothyroxine on 06/21/23. When I called the patient back she stated yeah that is what Chrissie told me too but I didn't pick it up.    Patient then asked if Dr. Hughes could send a 1 month supply to [facility] in [place].

## 2023-06-29 RX ORDER — LEVOTHYROXINE SODIUM 50 UG/1
50 TABLET ORAL DAILY
Qty: 30 TABLET | Refills: 0 | Status: SHIPPED | OUTPATIENT
Start: 2023-06-29 | End: 2023-09-12 | Stop reason: SDUPTHER

## 2023-07-17 ENCOUNTER — TELEPHONE (OUTPATIENT)
Dept: PRIMARY CARE | Facility: CLINIC | Age: 63
End: 2023-07-17
Payer: MEDICARE

## 2023-07-17 NOTE — TELEPHONE ENCOUNTER
Patient seen last month given antibiotic for UTI which is still there are you able to place an order to be tested so something else can be given ....

## 2023-07-18 DIAGNOSIS — R39.9 UTI SYMPTOMS: ICD-10-CM

## 2023-07-18 DIAGNOSIS — R35.0 FREQUENT URINATION: ICD-10-CM

## 2023-07-30 ENCOUNTER — HOSPITAL ENCOUNTER (EMERGENCY)
Age: 63
Discharge: PSYCHIATRIC OR SUBSTANCE USE FACILITY | End: 2023-07-31
Payer: MEDICARE

## 2023-07-30 DIAGNOSIS — F31.9 BIPOLAR 1 DISORDER (HCC): Primary | ICD-10-CM

## 2023-07-30 LAB
ALBUMIN SERPL-MCNC: 4.7 G/DL (ref 3.5–4.6)
ALP SERPL-CCNC: 55 U/L (ref 40–130)
ALT SERPL-CCNC: 22 U/L (ref 0–33)
AMPHET UR QL SCN: ABNORMAL
ANION GAP SERPL CALCULATED.3IONS-SCNC: 14 MEQ/L (ref 9–15)
APAP SERPL-MCNC: <5 UG/ML (ref 10–30)
AST SERPL-CCNC: 26 U/L (ref 0–35)
BACTERIA URNS QL MICRO: NEGATIVE /HPF
BARBITURATES UR QL SCN: ABNORMAL
BASOPHILS # BLD: 0.1 K/UL (ref 0–0.2)
BASOPHILS NFR BLD: 0.7 %
BENZODIAZ UR QL SCN: ABNORMAL
BILIRUB SERPL-MCNC: 0.4 MG/DL (ref 0.2–0.7)
BILIRUB UR QL STRIP: NEGATIVE
BUN SERPL-MCNC: 23 MG/DL (ref 8–23)
CALCIUM SERPL-MCNC: 9.5 MG/DL (ref 8.5–9.9)
CANNABINOIDS UR QL SCN: POSITIVE
CHLORIDE SERPL-SCNC: 102 MEQ/L (ref 95–107)
CHOLEST SERPL-MCNC: 167 MG/DL (ref 0–199)
CK SERPL-CCNC: 452 U/L (ref 0–170)
CLARITY UR: CLEAR
CO2 SERPL-SCNC: 21 MEQ/L (ref 20–31)
COCAINE UR QL SCN: ABNORMAL
COLOR UR: YELLOW
CREAT SERPL-MCNC: 1.26 MG/DL (ref 0.5–0.9)
DRUG SCREEN COMMENT UR-IMP: ABNORMAL
EOSINOPHIL # BLD: 0.1 K/UL (ref 0–0.7)
EOSINOPHIL NFR BLD: 1.1 %
EPI CELLS #/AREA URNS AUTO: ABNORMAL /HPF (ref 0–5)
ERYTHROCYTE [DISTWIDTH] IN BLOOD BY AUTOMATED COUNT: 13.2 % (ref 11.5–14.5)
ETHANOL PERCENT: NORMAL G/DL
ETHANOLAMINE SERPL-MCNC: <10 MG/DL (ref 0–0.08)
FENTANYL SCREEN, URINE: ABNORMAL
GLOBULIN SER CALC-MCNC: 2.3 G/DL (ref 2.3–3.5)
GLUCOSE SERPL-MCNC: 126 MG/DL (ref 70–99)
GLUCOSE UR STRIP-MCNC: NEGATIVE MG/DL
HCT VFR BLD AUTO: 40.9 % (ref 37–47)
HDLC SERPL-MCNC: 64 MG/DL (ref 40–59)
HGB BLD-MCNC: 13.8 G/DL (ref 12–16)
HGB UR QL STRIP: ABNORMAL
HYALINE CASTS #/AREA URNS AUTO: ABNORMAL /HPF (ref 0–5)
KETONES UR STRIP-MCNC: NEGATIVE MG/DL
LDLC SERPL CALC-MCNC: 85 MG/DL (ref 0–129)
LEUKOCYTE ESTERASE UR QL STRIP: NEGATIVE
LYMPHOCYTES # BLD: 1.8 K/UL (ref 1–4.8)
LYMPHOCYTES NFR BLD: 20.3 %
MCH RBC QN AUTO: 31.1 PG (ref 27–31.3)
MCHC RBC AUTO-ENTMCNC: 33.7 % (ref 33–37)
MCV RBC AUTO: 92.2 FL (ref 79.4–94.8)
METHADONE UR QL SCN: ABNORMAL
MONOCYTES # BLD: 0.7 K/UL (ref 0.2–0.8)
MONOCYTES NFR BLD: 8.3 %
NEUTROPHILS # BLD: 6 K/UL (ref 1.4–6.5)
NEUTS SEG NFR BLD: 69.6 %
NITRITE UR QL STRIP: NEGATIVE
OPIATES UR QL SCN: ABNORMAL
OXYCODONE UR QL SCN: ABNORMAL
PCP UR QL SCN: ABNORMAL
PH UR STRIP: 6 [PH] (ref 5–9)
PLATELET # BLD AUTO: 278 K/UL (ref 130–400)
POTASSIUM SERPL-SCNC: 3.1 MEQ/L (ref 3.4–4.9)
PROPOXYPH UR QL SCN: ABNORMAL
PROT SERPL-MCNC: 7 G/DL (ref 6.3–8)
PROT UR STRIP-MCNC: NEGATIVE MG/DL
RBC # BLD AUTO: 4.43 M/UL (ref 4.2–5.4)
RBC #/AREA URNS AUTO: ABNORMAL /HPF (ref 0–5)
RENAL EPI CELLS #/AREA URNS HPF: ABNORMAL /HPF
SALICYLATES SERPL-MCNC: <0.3 MG/DL (ref 15–30)
SODIUM SERPL-SCNC: 137 MEQ/L (ref 135–144)
SP GR UR STRIP: 1.02 (ref 1–1.03)
TRIGL SERPL-MCNC: 91 MG/DL (ref 0–150)
TSH SERPL-MCNC: 3.88 UIU/ML (ref 0.44–3.86)
URINE REFLEX TO CULTURE: ABNORMAL
UROBILINOGEN UR STRIP-ACNC: 0.2 E.U./DL
VALPROATE SERPL-MCNC: <2.8 UG/ML (ref 50–100)
WBC # BLD AUTO: 8.7 K/UL (ref 4.8–10.8)
WBC #/AREA URNS AUTO: ABNORMAL /HPF (ref 0–5)

## 2023-07-30 PROCEDURE — 36415 COLL VENOUS BLD VENIPUNCTURE: CPT

## 2023-07-30 PROCEDURE — 82077 ASSAY SPEC XCP UR&BREATH IA: CPT

## 2023-07-30 PROCEDURE — 80053 COMPREHEN METABOLIC PANEL: CPT

## 2023-07-30 PROCEDURE — 99284 EMERGENCY DEPT VISIT MOD MDM: CPT

## 2023-07-30 PROCEDURE — 80307 DRUG TEST PRSMV CHEM ANLYZR: CPT

## 2023-07-30 PROCEDURE — 6370000000 HC RX 637 (ALT 250 FOR IP): Performed by: PHYSICIAN ASSISTANT

## 2023-07-30 PROCEDURE — 81001 URINALYSIS AUTO W/SCOPE: CPT

## 2023-07-30 PROCEDURE — 80164 ASSAY DIPROPYLACETIC ACD TOT: CPT

## 2023-07-30 PROCEDURE — 85025 COMPLETE CBC W/AUTO DIFF WBC: CPT

## 2023-07-30 PROCEDURE — 84443 ASSAY THYROID STIM HORMONE: CPT

## 2023-07-30 PROCEDURE — 80061 LIPID PANEL: CPT

## 2023-07-30 PROCEDURE — 80179 DRUG ASSAY SALICYLATE: CPT

## 2023-07-30 PROCEDURE — 82550 ASSAY OF CK (CPK): CPT

## 2023-07-30 PROCEDURE — 80143 DRUG ASSAY ACETAMINOPHEN: CPT

## 2023-07-30 RX ORDER — HALOPERIDOL 5 MG/1
10 TABLET ORAL ONCE
Status: COMPLETED | OUTPATIENT
Start: 2023-07-30 | End: 2023-07-30

## 2023-07-30 RX ORDER — LORAZEPAM 1 MG/1
1 TABLET ORAL ONCE
Status: COMPLETED | OUTPATIENT
Start: 2023-07-30 | End: 2023-07-30

## 2023-07-30 RX ADMIN — LORAZEPAM 1 MG: 1 TABLET ORAL at 19:00

## 2023-07-30 RX ADMIN — HALOPERIDOL 10 MG: 5 TABLET ORAL at 19:01

## 2023-07-30 ASSESSMENT — PATIENT HEALTH QUESTIONNAIRE - PHQ9: SUM OF ALL RESPONSES TO PHQ QUESTIONS 1-9: 0

## 2023-07-30 ASSESSMENT — LIFESTYLE VARIABLES
HOW OFTEN DO YOU HAVE A DRINK CONTAINING ALCOHOL: 2-3 TIMES A WEEK
HOW MANY STANDARD DRINKS CONTAINING ALCOHOL DO YOU HAVE ON A TYPICAL DAY: 1 OR 2

## 2023-07-30 NOTE — ED NOTES
Patient trying doors and yelling through staff doors that she is ready to leave. Explained process and that rome had pinke sheeted here here and what that meant. Escalating and yelling loudly. Agreeable to oral meds. Rizwan Saliva notified. Mary Johnston  07/30/23 [id number]

## 2023-07-30 NOTE — ED PROVIDER NOTES
[facility] ED  EMERGENCY DEPARTMENT ENCOUNTER      Pt Name: Nish Ortiz  MRN: [id number]  [address] [facility] 1960  Date of evaluation: 7/30/2023  Provider: Rose Hodgkin, PA-C    CHIEF COMPLAINT       Chief Complaint   Patient presents with    Psychiatric Evaluation     [place] slipped by ELVIE          HISTORY OF PRESENT ILLNESS   (Location/Symptom, Timing/Onset, Context/Setting, Quality, Duration, Modifying Factors, Severity)  Note limiting factors. Nish Ortiz is a 70 y.o. female who presents to the emergency department stating that she was in the emergency department today due to her daughter calling [employer]. Patient states that she does not believe that she needs to be here she feels well. Patient states that she has been staying up at night and sleeping during the day and that is why her daughter [facility]. Patient denies any recent illnesses or injuries. Patient is demanding to have outside physicians called to release her. HPI    Nursing Notes were reviewed. REVIEW OF SYSTEMS    (2-9 systems for level 4, 10 or more for level 5)     Review of Systems   Unable to perform ROS: Psychiatric disorder     Except as noted above the remainder of the review of systems was reviewed and negative.        PAST MEDICAL HISTORY     Past Medical History:   Diagnosis Date    Anxiety     Cancer ([address]) 05/10/2017    DCIS left breast    Depression     Manic depression ([address])     Migraines     Neuropathy     Thyroid disease          SURGICAL HISTORY       Past Surgical History:   Procedure Laterality Date    BREAST BIOPSY Left 5/23/2017    INJECTION METHYLENE BLUE LEFT BREAST ULTRASOUND GUIDED LEFT BREAST LUMPECTOMY LEFT SENTINEL NODE BIOPSY, 90 MINS, EVICEL 2ML, PAT ON ADMIT  performed by Oscar Miguel MD at [facility]      t flap     BREAST SURGERY Left 6/27/13    exploration of left nipple with excisonal bx of ducts    BUNIONECTOMY      2006    [id number] [facility]

## 2023-07-30 NOTE — ED NOTES
Pt admitted to [facility] bed 4. Changed into psych safe clothes and skin intact. Belongings secured and no contraband found. Zone 1 notified of new pt. Lab called asnd notified of new pt.      Laila Peralta RN  07/30/23 5183

## 2023-07-30 NOTE — ED NOTES
Pt continues to come to nursing door asking what Dr she needs to see to leave and stating she can call him herself. Continues to not understand that she is pink slipped.       Zoraida Franklin RN  07/30/23 1195
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
## 2023-07-31 VITALS
BODY MASS INDEX: 23.54 KG/M2 | HEIGHT: 67 IN | TEMPERATURE: 98.2 F | WEIGHT: 150 LBS | HEART RATE: 82 BPM | OXYGEN SATURATION: 97 % | DIASTOLIC BLOOD PRESSURE: 75 MMHG | SYSTOLIC BLOOD PRESSURE: 124 MMHG | RESPIRATION RATE: 16 BRPM

## 2023-07-31 LAB
EKG ATRIAL RATE: 71 BPM
EKG P AXIS: 69 DEGREES
EKG P-R INTERVAL: 154 MS
EKG Q-T INTERVAL: 408 MS
EKG QRS DURATION: 72 MS
EKG QTC CALCULATION (BAZETT): 443 MS
EKG R AXIS: -1 DEGREES
EKG T AXIS: 83 DEGREES
EKG VENTRICULAR RATE: 71 BPM
MAGNESIUM SERPL-MCNC: 2.2 MG/DL (ref 1.7–2.4)
MAGNESIUM SERPL-MCNC: 2.3 MG/DL (ref 1.7–2.4)
POTASSIUM SERPL-SCNC: 2.8 MEQ/L (ref 3.4–4.9)
POTASSIUM SERPL-SCNC: 4.1 MEQ/L (ref 3.4–4.9)

## 2023-07-31 PROCEDURE — 84132 ASSAY OF SERUM POTASSIUM: CPT

## 2023-07-31 PROCEDURE — 83735 ASSAY OF MAGNESIUM: CPT

## 2023-07-31 PROCEDURE — 93005 ELECTROCARDIOGRAM TRACING: CPT | Performed by: PERSONAL EMERGENCY RESPONSE ATTENDANT

## 2023-07-31 PROCEDURE — 93010 ELECTROCARDIOGRAM REPORT: CPT | Performed by: INTERNAL MEDICINE

## 2023-07-31 PROCEDURE — 6370000000 HC RX 637 (ALT 250 FOR IP): Performed by: STUDENT IN AN ORGANIZED HEALTH CARE EDUCATION/TRAINING PROGRAM

## 2023-07-31 PROCEDURE — 36415 COLL VENOUS BLD VENIPUNCTURE: CPT

## 2023-07-31 RX ORDER — POTASSIUM CHLORIDE 20 MEQ/1
20 TABLET, EXTENDED RELEASE ORAL 2 TIMES DAILY WITH MEALS
Status: DISCONTINUED | OUTPATIENT
Start: 2023-07-31 | End: 2023-07-31

## 2023-07-31 RX ORDER — POTASSIUM CHLORIDE 20 MEQ/1
20 TABLET, EXTENDED RELEASE ORAL 2 TIMES DAILY WITH MEALS
Status: DISCONTINUED | OUTPATIENT
Start: 2023-07-31 | End: 2023-07-31 | Stop reason: HOSPADM

## 2023-07-31 RX ADMIN — POTASSIUM CHLORIDE 20 MEQ: 1500 TABLET, EXTENDED RELEASE ORAL at 08:30

## 2023-07-31 RX ADMIN — POTASSIUM BICARBONATE 50 MEQ: 978 TABLET, EFFERVESCENT ORAL at 05:28

## 2023-07-31 NOTE — ED NOTES
Patient resting quietly respirations are even and unlabored no distress noted at this time.       Zach Emerson RN  07/30/23 2051

## 2023-07-31 NOTE — ED NOTES
Patient resting quietly respirations are even and unlabored no distress noted at this time.        Daryl Rossi RN  07/30/23 3021

## 2023-07-31 NOTE — ED NOTES
Patient appears to be  sleeping respirations are even and unlabored no distress noted at this time.        Carmine Epley, RN  07/31/23 1912

## 2023-07-31 NOTE — ED NOTES
Per Joshua Chadwick LPN @ [facility]), Patient has been accepted to [facility] by Dr. Kailey Wiggins to [facility] with bed assignment upon arrival.Instructed to call Nursing report to [address], RN  07/31/23 9837

## 2023-07-31 NOTE — ED NOTES
Awaiting medical clearance & possible transfer to [facility] per report from [facility]. Resting with eyes closed & no acute distress noted @ this time.      Shital Carbajal RN  07/31/23 2851

## 2023-07-31 NOTE — ED NOTES
Patient resting quietly respirations are even and unlabored no distress noted at this time.       Zach Emerson RN  07/31/23 6737

## 2023-08-30 ENCOUNTER — LAB (OUTPATIENT)
Dept: LAB | Facility: LAB | Age: 63
End: 2023-08-30
Payer: MEDICARE

## 2023-08-30 DIAGNOSIS — E78.2 MIXED HYPERLIPIDEMIA: ICD-10-CM

## 2023-08-30 DIAGNOSIS — R35.0 FREQUENT URINATION: ICD-10-CM

## 2023-08-30 DIAGNOSIS — R39.9 UTI SYMPTOMS: ICD-10-CM

## 2023-08-30 DIAGNOSIS — E03.9 ACQUIRED HYPOTHYROIDISM: ICD-10-CM

## 2023-08-30 DIAGNOSIS — I10 PRIMARY HYPERTENSION: ICD-10-CM

## 2023-08-30 LAB
ALANINE AMINOTRANSFERASE (SGPT) (U/L) IN SER/PLAS: 13 U/L (ref 7–45)
ALBUMIN (G/DL) IN SER/PLAS: 4.3 G/DL (ref 3.4–5)
ALKALINE PHOSPHATASE (U/L) IN SER/PLAS: 44 U/L (ref 33–136)
ANION GAP IN SER/PLAS: 12 MMOL/L (ref 10–20)
APPEARANCE, URINE: ABNORMAL
ASPARTATE AMINOTRANSFERASE (SGOT) (U/L) IN SER/PLAS: 14 U/L (ref 9–39)
BACTERIA, URINE: ABNORMAL /HPF
BASOPHILS (10*3/UL) IN BLOOD BY AUTOMATED COUNT: 0.04 X10E9/L (ref 0–0.1)
BASOPHILS/100 LEUKOCYTES IN BLOOD BY AUTOMATED COUNT: 0.5 % (ref 0–2)
BILIRUBIN TOTAL (MG/DL) IN SER/PLAS: 0.3 MG/DL (ref 0–1.2)
BILIRUBIN, URINE: NEGATIVE
BLOOD, URINE: ABNORMAL
CALCIUM (MG/DL) IN SER/PLAS: 9.6 MG/DL (ref 8.6–10.3)
CARBON DIOXIDE, TOTAL (MMOL/L) IN SER/PLAS: 25 MMOL/L (ref 21–32)
CHLORIDE (MMOL/L) IN SER/PLAS: 105 MMOL/L (ref 98–107)
CHOLESTEROL (MG/DL) IN SER/PLAS: 193 MG/DL (ref 0–199)
CHOLESTEROL IN HDL (MG/DL) IN SER/PLAS: 60.2 MG/DL
CHOLESTEROL/HDL RATIO: 3.2
COLOR, URINE: YELLOW
CREATININE (MG/DL) IN SER/PLAS: 0.91 MG/DL (ref 0.5–1.05)
EOSINOPHILS (10*3/UL) IN BLOOD BY AUTOMATED COUNT: 0.07 X10E9/L (ref 0–0.7)
EOSINOPHILS/100 LEUKOCYTES IN BLOOD BY AUTOMATED COUNT: 1 % (ref 0–6)
ERYTHROCYTE DISTRIBUTION WIDTH (RATIO) BY AUTOMATED COUNT: 13.1 % (ref 11.5–14.5)
ERYTHROCYTE MEAN CORPUSCULAR HEMOGLOBIN CONCENTRATION (G/DL) BY AUTOMATED: 33.1 G/DL (ref 32–36)
ERYTHROCYTE MEAN CORPUSCULAR VOLUME (FL) BY AUTOMATED COUNT: 95 FL (ref 80–100)
ERYTHROCYTES (10*6/UL) IN BLOOD BY AUTOMATED COUNT: 4.19 X10E12/L (ref 4–5.2)
GFR FEMALE: 70 ML/MIN/1.73M2
GLUCOSE (MG/DL) IN SER/PLAS: 116 MG/DL (ref 74–99)
GLUCOSE, URINE: NEGATIVE MG/DL
HEMATOCRIT (%) IN BLOOD BY AUTOMATED COUNT: 39.9 % (ref 36–46)
HEMOGLOBIN (G/DL) IN BLOOD: 13.2 G/DL (ref 12–16)
IMMATURE GRANULOCYTES/100 LEUKOCYTES IN BLOOD BY AUTOMATED COUNT: 0.1 % (ref 0–0.9)
KETONES, URINE: NEGATIVE MG/DL
LDL: 79 MG/DL (ref 0–99)
LEUKOCYTE ESTERASE, URINE: ABNORMAL
LEUKOCYTES (10*3/UL) IN BLOOD BY AUTOMATED COUNT: 7.3 X10E9/L (ref 4.4–11.3)
LYMPHOCYTES (10*3/UL) IN BLOOD BY AUTOMATED COUNT: 2.43 X10E9/L (ref 1.2–4.8)
LYMPHOCYTES/100 LEUKOCYTES IN BLOOD BY AUTOMATED COUNT: 33.3 % (ref 13–44)
MAGNESIUM (MG/DL) IN SER/PLAS: 1.78 MG/DL (ref 1.6–2.4)
MONOCYTES (10*3/UL) IN BLOOD BY AUTOMATED COUNT: 0.34 X10E9/L (ref 0.1–1)
MONOCYTES/100 LEUKOCYTES IN BLOOD BY AUTOMATED COUNT: 4.7 % (ref 2–10)
MUCUS, URINE: ABNORMAL /LPF
NEUTROPHILS (10*3/UL) IN BLOOD BY AUTOMATED COUNT: 4.4 X10E9/L (ref 1.2–7.7)
NEUTROPHILS/100 LEUKOCYTES IN BLOOD BY AUTOMATED COUNT: 60.4 % (ref 40–80)
NITRITE, URINE: POSITIVE
NON HDL CHOLESTEROL: 133 MG/DL
PH, URINE: 6 (ref 5–8)
PLATELETS (10*3/UL) IN BLOOD AUTOMATED COUNT: 246 X10E9/L (ref 150–450)
POTASSIUM (MMOL/L) IN SER/PLAS: 3.9 MMOL/L (ref 3.5–5.3)
PROTEIN TOTAL: 6.7 G/DL (ref 6.4–8.2)
PROTEIN, URINE: NEGATIVE MG/DL
RBC, URINE: 4 /HPF (ref 0–5)
SODIUM (MMOL/L) IN SER/PLAS: 138 MMOL/L (ref 136–145)
SPECIFIC GRAVITY, URINE: 1.02 (ref 1–1.03)
SQUAMOUS EPITHELIAL CELLS, URINE: 1 /HPF
THYROTROPIN (MIU/L) IN SER/PLAS BY DETECTION LIMIT <= 0.05 MIU/L: 1.45 MIU/L (ref 0.44–3.98)
TRIGLYCERIDE (MG/DL) IN SER/PLAS: 271 MG/DL (ref 0–149)
UREA NITROGEN (MG/DL) IN SER/PLAS: 16 MG/DL (ref 6–23)
UROBILINOGEN, URINE: <2 MG/DL (ref 0–1.9)
VLDL: 54 MG/DL (ref 0–40)
WBC, URINE: 69 /HPF (ref 0–5)

## 2023-08-30 PROCEDURE — 83735 ASSAY OF MAGNESIUM: CPT

## 2023-08-30 PROCEDURE — 84443 ASSAY THYROID STIM HORMONE: CPT

## 2023-08-30 PROCEDURE — 85025 COMPLETE CBC W/AUTO DIFF WBC: CPT

## 2023-08-30 PROCEDURE — 81001 URINALYSIS AUTO W/SCOPE: CPT

## 2023-08-30 PROCEDURE — 80061 LIPID PANEL: CPT

## 2023-08-30 PROCEDURE — 36415 COLL VENOUS BLD VENIPUNCTURE: CPT

## 2023-08-30 PROCEDURE — 80053 COMPREHEN METABOLIC PANEL: CPT

## 2023-08-30 RX ORDER — NITROFURANTOIN 25; 75 MG/1; MG/1
100 CAPSULE ORAL 2 TIMES DAILY
Qty: 20 CAPSULE | Refills: 0 | Status: SHIPPED | OUTPATIENT
Start: 2023-08-30 | End: 2023-09-12 | Stop reason: ALTCHOICE

## 2023-09-07 PROBLEM — Z85.3 HISTORY OF BREAST CANCER: Status: RESOLVED | Noted: 2023-09-07 | Resolved: 2023-09-07

## 2023-09-07 RX ORDER — TRAZODONE HYDROCHLORIDE 100 MG/1
100 TABLET ORAL NIGHTLY
COMMUNITY
Start: 2023-08-10 | End: 2023-09-12 | Stop reason: ALTCHOICE

## 2023-09-07 RX ORDER — ARIPIPRAZOLE 10 MG/1
10 TABLET ORAL
COMMUNITY
Start: 2023-08-23

## 2023-09-07 RX ORDER — BUPROPION HYDROCHLORIDE 75 MG/1
TABLET ORAL
COMMUNITY
Start: 2023-08-10 | End: 2023-09-12 | Stop reason: ALTCHOICE

## 2023-09-07 RX ORDER — OLANZAPINE 10 MG/1
TABLET ORAL
COMMUNITY
Start: 2023-08-23

## 2023-09-07 RX ORDER — LEVETIRACETAM 500 MG/1
TABLET ORAL
COMMUNITY
Start: 2023-08-21 | End: 2023-09-12 | Stop reason: ALTCHOICE

## 2023-09-12 ENCOUNTER — OFFICE VISIT (OUTPATIENT)
Dept: PRIMARY CARE | Facility: CLINIC | Age: 63
End: 2023-09-12
Payer: MEDICARE

## 2023-09-12 VITALS
DIASTOLIC BLOOD PRESSURE: 82 MMHG | SYSTOLIC BLOOD PRESSURE: 124 MMHG | WEIGHT: 154.2 LBS | OXYGEN SATURATION: 95 % | HEART RATE: 98 BPM | TEMPERATURE: 97.6 F | HEIGHT: 66 IN | RESPIRATION RATE: 16 BRPM | BODY MASS INDEX: 24.78 KG/M2

## 2023-09-12 DIAGNOSIS — E03.9 ACQUIRED HYPOTHYROIDISM: ICD-10-CM

## 2023-09-12 DIAGNOSIS — I10 PRIMARY HYPERTENSION: Primary | ICD-10-CM

## 2023-09-12 DIAGNOSIS — J41.0 SIMPLE CHRONIC BRONCHITIS (MULTI): ICD-10-CM

## 2023-09-12 DIAGNOSIS — E03.9 HYPOTHYROIDISM, UNSPECIFIED TYPE: ICD-10-CM

## 2023-09-12 DIAGNOSIS — C50.412 MALIGNANT NEOPLASM OF UPPER-OUTER QUADRANT OF LEFT FEMALE BREAST, UNSPECIFIED ESTROGEN RECEPTOR STATUS (MULTI): ICD-10-CM

## 2023-09-12 DIAGNOSIS — E78.2 MIXED HYPERLIPIDEMIA: ICD-10-CM

## 2023-09-12 DIAGNOSIS — F31.9 BIPOLAR 1 DISORDER (MULTI): ICD-10-CM

## 2023-09-12 DIAGNOSIS — F17.210 CIGARETTE SMOKER: ICD-10-CM

## 2023-09-12 PROBLEM — R73.02 GLUCOSE INTOLERANCE (IMPAIRED GLUCOSE TOLERANCE): Status: RESOLVED | Noted: 2023-03-07 | Resolved: 2023-09-12

## 2023-09-12 PROBLEM — R06.2 WHEEZING: Status: RESOLVED | Noted: 2023-03-07 | Resolved: 2023-09-12

## 2023-09-12 PROBLEM — E66.09 CLASS 1 OBESITY DUE TO EXCESS CALORIES WITH BODY MASS INDEX (BMI) OF 30.0 TO 30.9 IN ADULT: Status: RESOLVED | Noted: 2023-03-07 | Resolved: 2023-09-12

## 2023-09-12 PROBLEM — E66.811 CLASS 1 OBESITY DUE TO EXCESS CALORIES WITH BODY MASS INDEX (BMI) OF 30.0 TO 30.9 IN ADULT: Status: RESOLVED | Noted: 2023-03-07 | Resolved: 2023-09-12

## 2023-09-12 PROBLEM — R92.8 ABNORMAL FINDING ON BREAST IMAGING: Status: RESOLVED | Noted: 2023-03-07 | Resolved: 2023-09-12

## 2023-09-12 PROBLEM — R53.83 FATIGUE: Status: RESOLVED | Noted: 2023-03-07 | Resolved: 2023-09-12

## 2023-09-12 PROBLEM — G47.00 INSOMNIA: Status: RESOLVED | Noted: 2023-03-07 | Resolved: 2023-09-12

## 2023-09-12 PROBLEM — Z85.3 HISTORY OF BREAST CANCER: Status: ACTIVE | Noted: 2023-03-07

## 2023-09-12 PROBLEM — E83.52 HYPERCALCEMIA: Status: RESOLVED | Noted: 2023-03-07 | Resolved: 2023-09-12

## 2023-09-12 PROBLEM — R10.11 RIGHT UPPER QUADRANT ABDOMINAL PAIN: Status: RESOLVED | Noted: 2023-03-07 | Resolved: 2023-09-12

## 2023-09-12 PROCEDURE — 3074F SYST BP LT 130 MM HG: CPT | Performed by: FAMILY MEDICINE

## 2023-09-12 PROCEDURE — 1160F RVW MEDS BY RX/DR IN RCRD: CPT | Performed by: FAMILY MEDICINE

## 2023-09-12 PROCEDURE — 3079F DIAST BP 80-89 MM HG: CPT | Performed by: FAMILY MEDICINE

## 2023-09-12 PROCEDURE — 99214 OFFICE O/P EST MOD 30 MIN: CPT | Performed by: FAMILY MEDICINE

## 2023-09-12 PROCEDURE — 1159F MED LIST DOCD IN RCRD: CPT | Performed by: FAMILY MEDICINE

## 2023-09-12 PROCEDURE — 1157F ADVNC CARE PLAN IN RCRD: CPT | Performed by: FAMILY MEDICINE

## 2023-09-12 PROCEDURE — 4004F PT TOBACCO SCREEN RCVD TLK: CPT | Performed by: FAMILY MEDICINE

## 2023-09-12 RX ORDER — CALCIUM CARBONATE 300MG(750)
400 TABLET,CHEWABLE ORAL DAILY
Qty: 90 TABLET | Refills: 3 | Status: SHIPPED | OUTPATIENT
Start: 2023-09-12 | End: 2024-09-11

## 2023-09-12 RX ORDER — LEVOTHYROXINE SODIUM 50 UG/1
50 TABLET ORAL DAILY
Qty: 90 TABLET | Refills: 3 | Status: SHIPPED | OUTPATIENT
Start: 2023-09-12 | End: 2024-09-11

## 2023-09-12 RX ORDER — TRIAMTERENE/HYDROCHLOROTHIAZID 37.5-25 MG
1 TABLET ORAL DAILY
Qty: 90 TABLET | Refills: 3 | Status: SHIPPED | OUTPATIENT
Start: 2023-09-12 | End: 2024-09-06

## 2023-09-12 ASSESSMENT — ENCOUNTER SYMPTOMS
SEIZURES: 0
PALPITATIONS: 0
AGITATION: 0
DYSURIA: 0
VOICE CHANGE: 0
HALLUCINATIONS: 0
DYSPHORIC MOOD: 0
TROUBLE SWALLOWING: 0
TREMORS: 0
PHOTOPHOBIA: 0
HEMATURIA: 0
ABDOMINAL PAIN: 0
JOINT SWELLING: 0
DIARRHEA: 0
MYALGIAS: 0
NAUSEA: 0
EYE PAIN: 0
RHINORRHEA: 0
APPETITE CHANGE: 0
ABDOMINAL DISTENTION: 0
WOUND: 0
CHOKING: 0
NERVOUS/ANXIOUS: 0
CHILLS: 0
ACTIVITY CHANGE: 0
CONSTIPATION: 0
SINUS PRESSURE: 0
FEVER: 0
BACK PAIN: 0
SLEEP DISTURBANCE: 0
BRUISES/BLEEDS EASILY: 0
FATIGUE: 0
DIAPHORESIS: 0
CONFUSION: 0
EYE REDNESS: 0
COUGH: 0
EYE DISCHARGE: 0
WEAKNESS: 0
WHEEZING: 0
HYPERTENSION: 1
LIGHT-HEADEDNESS: 0
CHEST TIGHTNESS: 0
SHORTNESS OF BREATH: 0
BLOOD IN STOOL: 0
NECK PAIN: 0
NUMBNESS: 0
POLYDIPSIA: 0
DECREASED CONCENTRATION: 1
FREQUENCY: 0
ARTHRALGIAS: 0
SORE THROAT: 0
VOMITING: 0
DIZZINESS: 0
FLANK PAIN: 0
ADENOPATHY: 0
UNEXPECTED WEIGHT CHANGE: 0
EYE ITCHING: 0
HEADACHES: 0
NECK STIFFNESS: 0
FACIAL ASYMMETRY: 0
SPEECH DIFFICULTY: 0

## 2023-09-12 NOTE — PROGRESS NOTES
Subjective   Patient ID: Marianna Crum is a 63 y.o. female who presents for Follow-up (Patient is being seen today to follow up from [facility] x 1 month ago due to exhaustion from moving into a new home. Patient states she was overwhelmed. /Patient's  would also like to test for early dementia, patient denies any confusion or forgetfulness but  would like to have this done. ).    Hypertension  This is a chronic problem. The current episode started more than 1 year ago. The problem is unchanged. The problem is controlled. Pertinent negatives include no chest pain, headaches, neck pain, palpitations or shortness of breath. Agents associated with hypertension include thyroid hormones. Past treatments include diuretics. The current treatment provides significant improvement. There are no compliance problems.  Identifiable causes of hypertension include a thyroid problem. There is no history of chronic renal disease.   Thyroid Problem  Presents for follow-up visit. Patient reports no anxiety, cold intolerance, constipation, diaphoresis, diarrhea, fatigue, heat intolerance, palpitations or tremors. Her past medical history is significant for hyperlipidemia. There is no history of diabetes.   Hyperlipidemia  This is a chronic problem. Recent lipid tests were reviewed and are high. Exacerbating diseases include hypothyroidism. She has no history of chronic renal disease, diabetes, liver disease, obesity or nephrotic syndrome. Factors aggravating her hyperlipidemia include thiazides. Pertinent negatives include no chest pain, myalgias or shortness of breath. Current antihyperlipidemic treatment includes diet change and exercise. The current treatment provides mild improvement of lipids. There are no compliance problems.  Risk factors for coronary artery disease include dyslipidemia, hypertension and post-menopausal.        Review of Systems   Constitutional:  Negative for activity change, appetite  "change, chills, diaphoresis, fatigue, fever and unexpected weight change.   HENT:  Negative for congestion, ear pain, hearing loss, nosebleeds, postnasal drip, rhinorrhea, sinus pressure, sneezing, sore throat, tinnitus, trouble swallowing and voice change.    Eyes:  Negative for photophobia, pain, discharge, redness, itching and visual disturbance.   Respiratory:  Negative for cough, choking, chest tightness, shortness of breath and wheezing.    Cardiovascular:  Negative for chest pain, palpitations and leg swelling.   Gastrointestinal:  Negative for abdominal distention, abdominal pain, blood in stool, constipation, diarrhea, nausea and vomiting.   Endocrine: Negative for cold intolerance, heat intolerance, polydipsia and polyuria.   Genitourinary:  Negative for dysuria, flank pain, frequency, hematuria and urgency.   Musculoskeletal:  Negative for arthralgias, back pain, joint swelling, myalgias, neck pain and neck stiffness.   Skin:  Negative for rash and wound.   Allergic/Immunologic: Negative for immunocompromised state.   Neurological:  Negative for dizziness, tremors, seizures, syncope, facial asymmetry, speech difficulty, weakness, light-headedness, numbness and headaches.   Hematological:  Negative for adenopathy. Does not bruise/bleed easily.   Psychiatric/Behavioral:  Positive for decreased concentration. Negative for agitation, behavioral problems, confusion, dysphoric mood, hallucinations, self-injury, sleep disturbance and suicidal ideas. The patient is not nervous/anxious.        Objective   /82 (BP Location: Right arm, Patient Position: Sitting)   Pulse 98   Temp 36.4 °C (97.6 °F)   Resp 16   Ht 1.676 m (5' 6\")   Wt 69.9 kg (154 lb 3.2 oz)   SpO2 95%   BMI 24.89 kg/m²     Physical Exam  Constitutional:       General: She is not in acute distress.     Appearance: She is not ill-appearing or diaphoretic.   HENT:      Head: Normocephalic and atraumatic.      Right Ear: External ear normal. "      Left Ear: External ear normal.      Nose: Nose normal. No rhinorrhea.   Eyes:      General: Lids are normal. No scleral icterus.        Right eye: No discharge.         Left eye: No discharge.      Conjunctiva/sclera: Conjunctivae normal.   Cardiovascular:      Rate and Rhythm: Normal rate and regular rhythm.      Pulses: Normal pulses.      Heart sounds: No murmur heard.  Pulmonary:      Effort: Pulmonary effort is normal. No respiratory distress.      Breath sounds: No decreased breath sounds, wheezing, rhonchi or rales.   Abdominal:      General: Bowel sounds are normal. There is no distension.      Palpations: Abdomen is soft. There is no mass.      Tenderness: There is no abdominal tenderness. There is no guarding or rebound.   Musculoskeletal:         General: No swelling, tenderness or deformity.      Cervical back: No rigidity or tenderness.      Right lower leg: No edema.      Left lower leg: No edema.   Lymphadenopathy:      Cervical: No cervical adenopathy.      Upper Body:      Right upper body: No supraclavicular adenopathy.      Left upper body: No supraclavicular adenopathy.   Skin:     General: Skin is warm and dry.      Coloration: Skin is not jaundiced or pale.      Findings: No erythema, lesion or rash.   Neurological:      General: No focal deficit present.      Mental Status: She is alert and oriented to person, place, and time.      Sensory: No sensory deficit.      Motor: Tremor present. No weakness.      Coordination: Coordination normal.      Gait: Gait normal.   Psychiatric:         Attention and Perception: She is inattentive.         Mood and Affect: Mood normal. Affect is not inappropriate.         Speech: Speech normal.         Behavior: Behavior normal.         Thought Content: Thought content normal.         Cognition and Memory: Cognition is not impaired. Memory is not impaired. She does not exhibit impaired recent memory or impaired remote memory.         Assessment/Plan    Diagnoses and all orders for this visit:  Primary hypertension  -     magnesium oxide (Mag-Ox) 400 mg tablet; Take 1 tablet (400 mg) by mouth once daily.  -     CBC and Auto Differential; Future  -     Comprehensive Metabolic Panel; Future  -     Lipid Panel; Future  -     Magnesium; Future  -     TSH with reflex to Free T4 if abnormal; Future  -     triamterene-hydrochlorothiazid (Maxzide-25) 37.5-25 mg tablet; Take 1 tablet by mouth once daily.  -     Follow Up In [facility] - PCP - Established; Future  Simple chronic bronchitis (CMS/HCC)  -     CT lung screening low dose; Future  -     Follow Up In [facility] - PCP - Established; Future  Bipolar 1 disorder (CMS/HCC)  -     Follow Up In [facility] - PCP - Established; Future  Malignant neoplasm of upper-outer quadrant of left female breast, unspecified estrogen receptor status (CMS/HCC)  -     Follow Up In [facility] - PCP - Established; Future  Mixed hyperlipidemia  -     CBC and Auto Differential; Future  -     Comprehensive Metabolic Panel; Future  -     Lipid Panel; Future  -     Magnesium; Future  -     TSH with reflex to Free T4 if abnormal; Future  -     Follow Up In [facility] - PCP - Established; Future  Acquired hypothyroidism  -     CBC and Auto Differential; Future  -     Comprehensive Metabolic Panel; Future  -     Lipid Panel; Future  -     Magnesium; Future  -     TSH with reflex to Free T4 if abnormal; Future  -     Follow Up In [facility] - PCP - Established; Future  Cigarette smoker  -     CT lung screening low dose; Future  -     Follow Up In [facility]  PCP - Established; Future  Hypothyroidism, unspecified type  -     levothyroxine (Synthroid, Levoxyl) 50 mcg tablet; Take 1 tablet (50 mcg) by mouth once daily.  -     Follow Up In [facility] - PCP - Established; Future

## 2023-09-24 ENCOUNTER — HOSPITAL ENCOUNTER (EMERGENCY)
Age: 63
Discharge: HOME | End: 2023-09-24
Payer: MEDICARE

## 2023-09-24 VITALS
TEMPERATURE: 98 F | OXYGEN SATURATION: 99 % | HEIGHT: 67 IN | RESPIRATION RATE: 14 BRPM | HEART RATE: 79 BPM | BODY MASS INDEX: 23.04 KG/M2 | WEIGHT: 146.8 LBS | SYSTOLIC BLOOD PRESSURE: 128 MMHG | DIASTOLIC BLOOD PRESSURE: 79 MMHG

## 2023-09-24 DIAGNOSIS — F41.9 ANXIETY: Primary | ICD-10-CM

## 2023-09-24 DIAGNOSIS — E87.6 HYPOKALEMIA: ICD-10-CM

## 2023-09-24 LAB
ALBUMIN SERPL-MCNC: 4.6 G/DL (ref 3.5–4.6)
ALP SERPL-CCNC: 55 U/L (ref 40–130)
ALT SERPL-CCNC: 8 U/L (ref 0–33)
AMPHET UR QL SCN: NORMAL
ANION GAP SERPL CALCULATED.3IONS-SCNC: 14 MEQ/L (ref 9–15)
APAP SERPL-MCNC: <5 UG/ML (ref 10–30)
AST SERPL-CCNC: 13 U/L (ref 0–35)
BACTERIA URNS QL MICRO: ABNORMAL /HPF
BARBITURATES UR QL SCN: NORMAL
BASOPHILS # BLD: 0 K/UL (ref 0–0.2)
BASOPHILS NFR BLD: 0.5 %
BENZODIAZ UR QL SCN: NORMAL
BILIRUB SERPL-MCNC: 0.4 MG/DL (ref 0.2–0.7)
BILIRUB UR QL STRIP: NEGATIVE
BUN SERPL-MCNC: 19 MG/DL (ref 8–23)
CALCIUM SERPL-MCNC: 9.1 MG/DL (ref 8.5–9.9)
CANNABINOIDS UR QL SCN: NORMAL
CHLORIDE SERPL-SCNC: 103 MEQ/L (ref 95–107)
CHOLEST SERPL-MCNC: 187 MG/DL (ref 0–199)
CK SERPL-CCNC: 74 U/L (ref 0–170)
CLARITY UR: CLEAR
CO2 SERPL-SCNC: 23 MEQ/L (ref 20–31)
COCAINE UR QL SCN: NORMAL
COLOR UR: YELLOW
CREAT SERPL-MCNC: 0.99 MG/DL (ref 0.5–0.9)
DRUG SCREEN COMMENT UR-IMP: NORMAL
EOSINOPHIL # BLD: 0 K/UL (ref 0–0.7)
EOSINOPHIL NFR BLD: 0.1 %
EPI CELLS #/AREA URNS HPF: ABNORMAL /HPF
ERYTHROCYTE [DISTWIDTH] IN BLOOD BY AUTOMATED COUNT: 12.6 % (ref 11.5–14.5)
ETHANOL PERCENT: NORMAL G/DL
ETHANOLAMINE SERPL-MCNC: <10 MG/DL (ref 0–0.08)
FENTANYL SCREEN, URINE: NORMAL
GLOBULIN SER CALC-MCNC: 2.4 G/DL (ref 2.3–3.5)
GLUCOSE SERPL-MCNC: 120 MG/DL (ref 70–99)
GLUCOSE UR STRIP-MCNC: NEGATIVE MG/DL
HCT VFR BLD AUTO: 42.5 % (ref 37–47)
HDLC SERPL-MCNC: 71 MG/DL (ref 40–59)
HGB BLD-MCNC: 14.3 G/DL (ref 12–16)
HGB UR QL STRIP: ABNORMAL
KETONES UR STRIP-MCNC: ABNORMAL MG/DL
LDLC SERPL CALC-MCNC: 97 MG/DL (ref 0–129)
LEUKOCYTE ESTERASE UR QL STRIP: ABNORMAL
LYMPHOCYTES # BLD: 1.4 K/UL (ref 1–4.8)
LYMPHOCYTES NFR BLD: 18.8 %
MCH RBC QN AUTO: 31 PG (ref 27–31.3)
MCHC RBC AUTO-ENTMCNC: 33.6 % (ref 33–37)
MCV RBC AUTO: 92 FL (ref 79.4–94.8)
METHADONE UR QL SCN: NORMAL
MONOCYTES # BLD: 0.4 K/UL (ref 0.2–0.8)
MONOCYTES NFR BLD: 5.8 %
NEUTROPHILS # BLD: 5.7 K/UL (ref 1.4–6.5)
NEUTS SEG NFR BLD: 74.5 %
NITRITE UR QL STRIP: POSITIVE
OPIATES UR QL SCN: NORMAL
OXYCODONE UR QL SCN: NORMAL
PCP UR QL SCN: NORMAL
PH UR STRIP: 7 [PH] (ref 5–9)
PLATELET # BLD AUTO: 266 K/UL (ref 130–400)
POTASSIUM SERPL-SCNC: 3 MEQ/L (ref 3.4–4.9)
PROPOXYPH UR QL SCN: NORMAL
PROT SERPL-MCNC: 7 G/DL (ref 6.3–8)
PROT UR STRIP-MCNC: NEGATIVE MG/DL
RBC # BLD AUTO: 4.62 M/UL (ref 4.2–5.4)
RBC #/AREA URNS HPF: ABNORMAL /HPF (ref 0–2)
RENAL EPI CELLS #/AREA URNS HPF: ABNORMAL /HPF
SALICYLATES SERPL-MCNC: <0.3 MG/DL (ref 15–30)
SODIUM SERPL-SCNC: 140 MEQ/L (ref 135–144)
SP GR UR STRIP: 1.01 (ref 1–1.03)
TRIGL SERPL-MCNC: 95 MG/DL (ref 0–150)
TROPONIN T SERPL-MCNC: <0.01 NG/ML (ref 0–0.01)
TSH SERPL-MCNC: 1.24 UIU/ML (ref 0.44–3.86)
URINE REFLEX TO CULTURE: ABNORMAL
UROBILINOGEN UR STRIP-ACNC: 0.2 E.U./DL
WBC # BLD AUTO: 7.6 K/UL (ref 4.8–10.8)
WBC #/AREA URNS HPF: ABNORMAL /HPF (ref 0–5)

## 2023-09-24 PROCEDURE — 93005 ELECTROCARDIOGRAM TRACING: CPT | Performed by: STUDENT IN AN ORGANIZED HEALTH CARE EDUCATION/TRAINING PROGRAM

## 2023-09-24 PROCEDURE — 6360000002 HC RX W HCPCS: Performed by: STUDENT IN AN ORGANIZED HEALTH CARE EDUCATION/TRAINING PROGRAM

## 2023-09-24 PROCEDURE — 99284 EMERGENCY DEPT VISIT MOD MDM: CPT

## 2023-09-24 PROCEDURE — 36415 COLL VENOUS BLD VENIPUNCTURE: CPT

## 2023-09-24 PROCEDURE — 81001 URINALYSIS AUTO W/SCOPE: CPT

## 2023-09-24 PROCEDURE — 84484 ASSAY OF TROPONIN QUANT: CPT

## 2023-09-24 PROCEDURE — 96361 HYDRATE IV INFUSION ADD-ON: CPT

## 2023-09-24 PROCEDURE — 80053 COMPREHEN METABOLIC PANEL: CPT

## 2023-09-24 PROCEDURE — 80307 DRUG TEST PRSMV CHEM ANLYZR: CPT

## 2023-09-24 PROCEDURE — 2580000003 HC RX 258: Performed by: STUDENT IN AN ORGANIZED HEALTH CARE EDUCATION/TRAINING PROGRAM

## 2023-09-24 PROCEDURE — 80179 DRUG ASSAY SALICYLATE: CPT

## 2023-09-24 PROCEDURE — 82077 ASSAY SPEC XCP UR&BREATH IA: CPT

## 2023-09-24 PROCEDURE — 80143 DRUG ASSAY ACETAMINOPHEN: CPT

## 2023-09-24 PROCEDURE — 85025 COMPLETE CBC W/AUTO DIFF WBC: CPT

## 2023-09-24 PROCEDURE — 96374 THER/PROPH/DIAG INJ IV PUSH: CPT

## 2023-09-24 PROCEDURE — 80061 LIPID PANEL: CPT

## 2023-09-24 PROCEDURE — 6370000000 HC RX 637 (ALT 250 FOR IP): Performed by: STUDENT IN AN ORGANIZED HEALTH CARE EDUCATION/TRAINING PROGRAM

## 2023-09-24 PROCEDURE — 82550 ASSAY OF CK (CPK): CPT

## 2023-09-24 PROCEDURE — 84443 ASSAY THYROID STIM HORMONE: CPT

## 2023-09-24 RX ORDER — POTASSIUM CHLORIDE 750 MG/1
40 TABLET, FILM COATED, EXTENDED RELEASE ORAL ONCE
Status: COMPLETED | OUTPATIENT
Start: 2023-09-24 | End: 2023-09-24

## 2023-09-24 RX ORDER — HYDROXYZINE PAMOATE 25 MG/1
25 CAPSULE ORAL 3 TIMES DAILY PRN
Qty: 15 CAPSULE | Refills: 0 | Status: SHIPPED | OUTPATIENT
Start: 2023-09-24 | End: 2023-09-29

## 2023-09-24 RX ORDER — 0.9 % SODIUM CHLORIDE 0.9 %
1000 INTRAVENOUS SOLUTION INTRAVENOUS ONCE
Status: COMPLETED | OUTPATIENT
Start: 2023-09-24 | End: 2023-09-24

## 2023-09-24 RX ORDER — LORAZEPAM 2 MG/ML
1 INJECTION INTRAMUSCULAR ONCE
Status: COMPLETED | OUTPATIENT
Start: 2023-09-24 | End: 2023-09-24

## 2023-09-24 RX ADMIN — POTASSIUM CHLORIDE 40 MEQ: 750 TABLET, FILM COATED, EXTENDED RELEASE ORAL at 14:53

## 2023-09-24 RX ADMIN — LORAZEPAM 1 MG: 2 INJECTION INTRAMUSCULAR; INTRAVENOUS at 13:58

## 2023-09-24 RX ADMIN — SODIUM CHLORIDE 1000 ML: 9 INJECTION, SOLUTION INTRAVENOUS at 13:56

## 2023-09-24 ASSESSMENT — ENCOUNTER SYMPTOMS
WHEEZING: 0
DIARRHEA: 0
VOMITING: 0
ABDOMINAL DISTENTION: 0
SHORTNESS OF BREATH: 0
PHOTOPHOBIA: 0
COUGH: 0
ABDOMINAL PAIN: 0
CONSTIPATION: 0
NAUSEA: 0

## 2023-09-24 ASSESSMENT — LIFESTYLE VARIABLES
HOW OFTEN DO YOU HAVE A DRINK CONTAINING ALCOHOL: MONTHLY OR LESS
HOW MANY STANDARD DRINKS CONTAINING ALCOHOL DO YOU HAVE ON A TYPICAL DAY: 1 OR 2

## 2023-09-24 ASSESSMENT — PAIN - FUNCTIONAL ASSESSMENT: PAIN_FUNCTIONAL_ASSESSMENT: NONE - DENIES PAIN

## 2023-09-24 NOTE — ED TRIAGE NOTES
Patient arrived to ER by private vehicle from home with her son. Patient states had recent medication changes and is experiencing anxiety x1 month. Patient denies any HI/SI.

## 2023-09-25 LAB
EKG ATRIAL RATE: 81 BPM
EKG P AXIS: -24 DEGREES
EKG P-R INTERVAL: 124 MS
EKG Q-T INTERVAL: 386 MS
EKG QRS DURATION: 68 MS
EKG QTC CALCULATION (BAZETT): 448 MS
EKG R AXIS: -10 DEGREES
EKG T AXIS: -6 DEGREES
EKG VENTRICULAR RATE: 81 BPM

## 2023-09-25 PROCEDURE — 93010 ELECTROCARDIOGRAM REPORT: CPT | Performed by: INTERNAL MEDICINE

## 2023-12-11 ENCOUNTER — APPOINTMENT (OUTPATIENT)
Dept: PRIMARY CARE | Facility: CLINIC | Age: 63
End: 2023-12-11
Payer: MEDICARE

## 2024-01-25 NOTE — ED TRIAGE NOTES
Pt arrives to ED, from home, via personal vehicle-pt's spouse is at her bedside. Pt presents with left breast pain with redness. Pt had a T-flap procedure, five years ago, d/t breast cancer. Pt states the onset of, what appears to be cellulitis of the left breast began yesterday.
Statement Selected

## 2024-03-28 ENCOUNTER — TELEPHONE (OUTPATIENT)
Dept: PRIMARY CARE | Facility: CLINIC | Age: 64
End: 2024-03-28
Payer: MEDICARE

## 2024-04-02 ENCOUNTER — OFFICE VISIT (OUTPATIENT)
Dept: GASTROENTEROLOGY | Age: 64
End: 2024-04-02
Payer: MEDICARE

## 2024-04-02 VITALS — OXYGEN SATURATION: 98 % | HEIGHT: 67 IN | BODY MASS INDEX: 24.48 KG/M2 | HEART RATE: 62 BPM | WEIGHT: 156 LBS

## 2024-04-02 DIAGNOSIS — R19.7 DIARRHEA, UNSPECIFIED TYPE: Primary | ICD-10-CM

## 2024-04-02 PROCEDURE — G8400 PT W/DXA NO RESULTS DOC: HCPCS | Performed by: INTERNAL MEDICINE

## 2024-04-02 PROCEDURE — 3017F COLORECTAL CA SCREEN DOC REV: CPT | Performed by: INTERNAL MEDICINE

## 2024-04-02 PROCEDURE — G8420 CALC BMI NORM PARAMETERS: HCPCS | Performed by: INTERNAL MEDICINE

## 2024-04-02 PROCEDURE — G8427 DOCREV CUR MEDS BY ELIG CLIN: HCPCS | Performed by: INTERNAL MEDICINE

## 2024-04-02 PROCEDURE — 1090F PRES/ABSN URINE INCON ASSESS: CPT | Performed by: INTERNAL MEDICINE

## 2024-04-02 PROCEDURE — 4004F PT TOBACCO SCREEN RCVD TLK: CPT | Performed by: INTERNAL MEDICINE

## 2024-04-02 PROCEDURE — 99203 OFFICE O/P NEW LOW 30 MIN: CPT | Performed by: INTERNAL MEDICINE

## 2024-04-02 PROCEDURE — 1124F ACP DISCUSS-NO DSCNMKR DOCD: CPT | Performed by: INTERNAL MEDICINE

## 2024-04-02 RX ORDER — OLANZAPINE 5 MG/1
TABLET ORAL
COMMUNITY
Start: 2024-03-20

## 2024-04-02 RX ORDER — LORAZEPAM 0.5 MG/1
0.5 TABLET ORAL
COMMUNITY
Start: 2024-03-18

## 2024-04-02 RX ORDER — IBUPROFEN 200 MG
200 TABLET ORAL EVERY 6 HOURS PRN
COMMUNITY

## 2024-04-02 NOTE — PROGRESS NOTES
Gastroenterology Clinic Visit    Sara COREY [facility]  [id number]  Chief Complaint   Patient presents with    New Patient     HPI: 64 y.o. female referred to the GI clinic with new onset of loose bowel movements/diarrhea.  Patient reports having 4-5 episodes of watery bowel movements, mainly in the morning.  She denies any blood or mucus in the stool.  She denies any abdominal pain or cramping.  She denies any weight loss.  She does report having weight gain over the last 3 weeks after quitting smoking, reports about 10 pounds of weight gain.  She does not recall starting any new medication 3 months prior.  Does report having change in her eating habits,  underwent a back surgery 3 months ago after which they have been eating out and drinking 2-3 drinks with most meals in the evening.    Previous GI work up/Endoscopic investigations:   Colonoscopy: 2018: Normal    Review of Systems   All other systems reviewed and are negative.    Past Medical History:   Diagnosis Date    Anxiety     Cancer (HCC) 05/10/2017    DCIS left breast    Depression     Manic depression (HCC)     Migraines     Neuropathy     Thyroid disease      Past Surgical History:   Procedure Laterality Date    BREAST BIOPSY Left 2017    INJECTION METHYLENE BLUE LEFT BREAST ULTRASOUND GUIDED LEFT BREAST LUMPECTOMY LEFT SENTINEL NODE BIOPSY, 90 MINS, EVICEL 2ML, PAT ON ADMIT  performed by Catalina Page [facility]    BREAST RECONSTRUCTION      t flap     BREAST SURGERY Left 13    exploration of left nipple with excisonal bx of ducts    BUNIONECTOMY      2006     SECTION  ,     COLONOSCOPY      HYSTERECTOMY (CERVIX STATUS UNKNOWN)      HYSTERECTOMY, TOTAL ABDOMINAL (CERVIX REMOVED)      with BSO    MASTECTOMY Left     [place] COLON CA SCRN NOT HI RSK IND N/A 2018    COLONOSCOPY performed by Trenton Palacios MD at [facility]    TONSILLECTOMY  1974     Current Outpatient Medications on File Prior to Visit   Medication Sig

## 2024-04-05 DIAGNOSIS — R19.7 DIARRHEA, UNSPECIFIED TYPE: ICD-10-CM

## 2024-04-08 LAB — CALPROTECTIN STL-MCNT: 62 UG/G

## 2024-04-11 ENCOUNTER — TELEPHONE (OUTPATIENT)
Dept: PRIMARY CARE | Facility: CLINIC | Age: 64
End: 2024-04-11
Payer: MEDICARE

## 2024-04-11 DIAGNOSIS — Z12.31 ENCOUNTER FOR SCREENING MAMMOGRAM FOR MALIGNANT NEOPLASM OF BREAST: Primary | ICD-10-CM

## 2024-04-11 DIAGNOSIS — Z12.39 BREAST SCREENING: ICD-10-CM

## 2024-05-21 ENCOUNTER — HOSPITAL ENCOUNTER (OUTPATIENT)
Dept: RADIOLOGY | Facility: HOSPITAL | Age: 64
Discharge: HOME | End: 2024-05-21
Payer: MEDICARE

## 2024-05-21 DIAGNOSIS — Z12.31 ENCOUNTER FOR SCREENING MAMMOGRAM FOR MALIGNANT NEOPLASM OF BREAST: ICD-10-CM

## 2024-05-21 PROCEDURE — 77067 SCR MAMMO BI INCL CAD: CPT | Mod: RIGHT SIDE | Performed by: RADIOLOGY

## 2024-05-21 PROCEDURE — 77067 SCR MAMMO BI INCL CAD: CPT | Mod: RT

## 2024-05-21 PROCEDURE — 77063 BREAST TOMOSYNTHESIS BI: CPT | Mod: RIGHT SIDE | Performed by: RADIOLOGY

## 2024-06-04 NOTE — PROGRESS NOTES
Gastroenterology Clinic Follow up Visit    Sara COREY harvinder  [id number]  Chief Complaint   Patient presents with    Follow-up       HPI: 64 y.o. female following up after last GI clinic on 4/2/2024     Interval change: Patient is follow up to GI clinic for diarrhea. She reports some improvement in frequency of bowel movements to 3 times a day, however consistency remains loose and brown in color.  Patent's recent Calprotectin was slightly elevated at 62.  She has stopped taking Metamucil, due to difficulty with consistency and taste. She has stopped smoking 3/23/2024 and does endorse a 20 lb weight gain.  She does report frequently eating meals away from home and 2 to 3 alcoholic drinks daily.  She has    HPI from last GI clinic visit on 4/2/2024 summarized below:  64 y.o. female referred to the GI clinic with new onset of loose bowel movements/diarrhea.  Patient reports having 4-5 episodes of watery bowel movements, mainly in the morning.  She denies any blood or mucus in the stool.  She denies any abdominal pain or cramping.  She denies any weight loss.  She does report having weight gain over the last 3 weeks after quitting smoking, reports about 10 pounds of weight gain.  She does not recall starting any new medication 3 months prior.  Does report having change in her eating habits,  underwent a back surgery 3 months ago after which they have been eating out and drinking 2-3 drinks with most meals in the evening.     Previous GI work up/Endoscopic investigations:   Colonoscopy: 9/25/2018: Normal    Review of Systems     Past medical history, past surgical history, medication list, social and familyhistory reviewed    Pulse 70, height 1.702 m (5' 7\"), weight 79.4 kg (175 lb), SpO2 99 %, not currently breastfeeding.    Physical Exam    Laboratory, Pathology, Radiology reviewed in detail with relevantimportant investigations summarized below:    No results for input(s): \"WBC\", \"HGB\", \"HCT\", \"MCV\", \"PLT\" in the last

## 2024-06-05 ENCOUNTER — OFFICE VISIT (OUTPATIENT)
Dept: GASTROENTEROLOGY | Age: 64
End: 2024-06-05
Payer: MEDICARE

## 2024-06-05 VITALS — OXYGEN SATURATION: 99 % | HEART RATE: 70 BPM | WEIGHT: 175 LBS | HEIGHT: 67 IN | BODY MASS INDEX: 27.47 KG/M2

## 2024-06-05 DIAGNOSIS — R19.7 DIARRHEA, UNSPECIFIED TYPE: Primary | ICD-10-CM

## 2024-06-05 DIAGNOSIS — R63.5 WEIGHT GAIN: ICD-10-CM

## 2024-06-05 PROCEDURE — G8400 PT W/DXA NO RESULTS DOC: HCPCS | Performed by: NURSE PRACTITIONER

## 2024-06-05 PROCEDURE — G8427 DOCREV CUR MEDS BY ELIG CLIN: HCPCS | Performed by: NURSE PRACTITIONER

## 2024-06-05 PROCEDURE — G8419 CALC BMI OUT NRM PARAM NOF/U: HCPCS | Performed by: NURSE PRACTITIONER

## 2024-06-05 PROCEDURE — 99213 OFFICE O/P EST LOW 20 MIN: CPT | Performed by: NURSE PRACTITIONER

## 2024-06-05 PROCEDURE — 3017F COLORECTAL CA SCREEN DOC REV: CPT | Performed by: NURSE PRACTITIONER

## 2024-06-05 PROCEDURE — 1124F ACP DISCUSS-NO DSCNMKR DOCD: CPT | Performed by: NURSE PRACTITIONER

## 2024-06-05 PROCEDURE — 1090F PRES/ABSN URINE INCON ASSESS: CPT | Performed by: NURSE PRACTITIONER

## 2024-06-05 PROCEDURE — 4004F PT TOBACCO SCREEN RCVD TLK: CPT | Performed by: NURSE PRACTITIONER
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
## 2024-09-06 DIAGNOSIS — I10 PRIMARY HYPERTENSION: ICD-10-CM

## 2024-09-06 DIAGNOSIS — E03.9 HYPOTHYROIDISM, UNSPECIFIED TYPE: ICD-10-CM

## 2024-09-06 RX ORDER — TRIAMTERENE/HYDROCHLOROTHIAZID 37.5-25 MG
1 TABLET ORAL DAILY
Qty: 90 TABLET | Refills: 0 | Status: SHIPPED | OUTPATIENT
Start: 2024-09-06 | End: 2025-09-01

## 2024-09-06 RX ORDER — LEVOTHYROXINE SODIUM 50 UG/1
50 TABLET ORAL DAILY
Qty: 90 TABLET | Refills: 0 | Status: SHIPPED | OUTPATIENT
Start: 2024-09-06 | End: 2025-09-06

## 2024-09-06 NOTE — TELEPHONE ENCOUNTER
Patient phones the office today after making her appointment to see TW on 10-1-24 with the call center and states she will need the following scripts to be sent to [facility] on [address] in [place] to see her through until then.     *Levothyroxine (Synthroid, Levoxyl) 50mcg 1 TAB every day     *Triamterene-Hydrochlorothiazide (Maxzide-25) 37.5-25mg 1 TAB every day     Thank you.

## 2024-09-10 ENCOUNTER — OFFICE VISIT (OUTPATIENT)
Dept: GASTROENTEROLOGY | Age: 64
End: 2024-09-10
Payer: MEDICARE

## 2024-09-10 VITALS — BODY MASS INDEX: 29.98 KG/M2 | HEART RATE: 74 BPM | OXYGEN SATURATION: 97 % | HEIGHT: 67 IN | WEIGHT: 191 LBS

## 2024-09-10 DIAGNOSIS — R19.7 DIARRHEA, UNSPECIFIED TYPE: Primary | ICD-10-CM

## 2024-09-10 PROCEDURE — 1090F PRES/ABSN URINE INCON ASSESS: CPT | Performed by: NURSE PRACTITIONER

## 2024-09-10 PROCEDURE — 99213 OFFICE O/P EST LOW 20 MIN: CPT | Performed by: NURSE PRACTITIONER

## 2024-09-10 PROCEDURE — 1124F ACP DISCUSS-NO DSCNMKR DOCD: CPT | Performed by: NURSE PRACTITIONER

## 2024-09-10 PROCEDURE — G8427 DOCREV CUR MEDS BY ELIG CLIN: HCPCS | Performed by: NURSE PRACTITIONER

## 2024-09-10 PROCEDURE — 3017F COLORECTAL CA SCREEN DOC REV: CPT | Performed by: NURSE PRACTITIONER

## 2024-09-10 PROCEDURE — G8419 CALC BMI OUT NRM PARAM NOF/U: HCPCS | Performed by: NURSE PRACTITIONER

## 2024-09-10 PROCEDURE — G8400 PT W/DXA NO RESULTS DOC: HCPCS | Performed by: NURSE PRACTITIONER

## 2024-09-10 PROCEDURE — 4004F PT TOBACCO SCREEN RCVD TLK: CPT | Performed by: NURSE PRACTITIONER

## 2024-09-10 ASSESSMENT — ENCOUNTER SYMPTOMS: DIARRHEA: 1

## 2024-09-11 ENCOUNTER — LAB (OUTPATIENT)
Dept: LAB | Facility: LAB | Age: 64
End: 2024-09-11
Payer: MEDICARE

## 2024-09-11 DIAGNOSIS — I10 PRIMARY HYPERTENSION: ICD-10-CM

## 2024-09-11 DIAGNOSIS — E03.9 ACQUIRED HYPOTHYROIDISM: ICD-10-CM

## 2024-09-11 DIAGNOSIS — E78.2 MIXED HYPERLIPIDEMIA: ICD-10-CM

## 2024-09-11 LAB
ALBUMIN SERPL BCP-MCNC: 4.1 G/DL (ref 3.4–5)
ALP SERPL-CCNC: 55 U/L (ref 33–136)
ALT SERPL W P-5'-P-CCNC: 16 U/L (ref 7–45)
ANION GAP SERPL CALC-SCNC: 13 MMOL/L (ref 10–20)
AST SERPL W P-5'-P-CCNC: 17 U/L (ref 9–39)
BASOPHILS # BLD AUTO: 0.07 X10*3/UL (ref 0–0.1)
BASOPHILS NFR BLD AUTO: 1.2 %
BILIRUB SERPL-MCNC: 0.4 MG/DL (ref 0–1.2)
BUN SERPL-MCNC: 24 MG/DL (ref 6–23)
CALCIUM SERPL-MCNC: 9.2 MG/DL (ref 8.6–10.3)
CHLORIDE SERPL-SCNC: 108 MMOL/L (ref 98–107)
CHOLEST SERPL-MCNC: 223 MG/DL (ref 0–199)
CHOLESTEROL/HDL RATIO: 2.8
CO2 SERPL-SCNC: 25 MMOL/L (ref 21–32)
CREAT SERPL-MCNC: 1.31 MG/DL (ref 0.5–1.05)
EGFRCR SERPLBLD CKD-EPI 2021: 45 ML/MIN/1.73M*2
EOSINOPHIL # BLD AUTO: 0.54 X10*3/UL (ref 0–0.7)
EOSINOPHIL NFR BLD AUTO: 9.3 %
ERYTHROCYTE [DISTWIDTH] IN BLOOD BY AUTOMATED COUNT: 12.9 % (ref 11.5–14.5)
GLUCOSE SERPL-MCNC: 104 MG/DL (ref 74–99)
HCT VFR BLD AUTO: 41.1 % (ref 36–46)
HDLC SERPL-MCNC: 79.8 MG/DL
HGB BLD-MCNC: 13.3 G/DL (ref 12–16)
IMM GRANULOCYTES # BLD AUTO: 0.01 X10*3/UL (ref 0–0.7)
IMM GRANULOCYTES NFR BLD AUTO: 0.2 % (ref 0–0.9)
LDLC SERPL CALC-MCNC: 120 MG/DL
LYMPHOCYTES # BLD AUTO: 1.92 X10*3/UL (ref 1.2–4.8)
LYMPHOCYTES NFR BLD AUTO: 33.2 %
MAGNESIUM SERPL-MCNC: 2.05 MG/DL (ref 1.6–2.4)
MCH RBC QN AUTO: 31.1 PG (ref 26–34)
MCHC RBC AUTO-ENTMCNC: 32.4 G/DL (ref 32–36)
MCV RBC AUTO: 96 FL (ref 80–100)
MONOCYTES # BLD AUTO: 0.37 X10*3/UL (ref 0.1–1)
MONOCYTES NFR BLD AUTO: 6.4 %
NEUTROPHILS # BLD AUTO: 2.88 X10*3/UL (ref 1.2–7.7)
NEUTROPHILS NFR BLD AUTO: 49.7 %
NON HDL CHOLESTEROL: 143 MG/DL (ref 0–149)
NRBC BLD-RTO: 0 /100 WBCS (ref 0–0)
PLATELET # BLD AUTO: 226 X10*3/UL (ref 150–450)
POTASSIUM SERPL-SCNC: 4 MMOL/L (ref 3.5–5.3)
PROT SERPL-MCNC: 6.3 G/DL (ref 6.4–8.2)
RBC # BLD AUTO: 4.28 X10*6/UL (ref 4–5.2)
SODIUM SERPL-SCNC: 142 MMOL/L (ref 136–145)
T4 FREE SERPL-MCNC: 0.77 NG/DL (ref 0.61–1.12)
TRIGL SERPL-MCNC: 118 MG/DL (ref 0–149)
TSH SERPL-ACNC: 4.04 MIU/L (ref 0.44–3.98)
VLDL: 24 MG/DL (ref 0–40)
WBC # BLD AUTO: 5.8 X10*3/UL (ref 4.4–11.3)

## 2024-09-11 PROCEDURE — 85025 COMPLETE CBC W/AUTO DIFF WBC: CPT

## 2024-09-11 PROCEDURE — 36415 COLL VENOUS BLD VENIPUNCTURE: CPT

## 2024-09-11 PROCEDURE — 83735 ASSAY OF MAGNESIUM: CPT

## 2024-09-11 PROCEDURE — 80053 COMPREHEN METABOLIC PANEL: CPT

## 2024-09-11 PROCEDURE — 84443 ASSAY THYROID STIM HORMONE: CPT

## 2024-09-11 PROCEDURE — 80061 LIPID PANEL: CPT

## 2024-09-11 PROCEDURE — 84439 ASSAY OF FREE THYROXINE: CPT

## 2024-10-01 ENCOUNTER — APPOINTMENT (OUTPATIENT)
Dept: PRIMARY CARE | Facility: CLINIC | Age: 64
End: 2024-10-01
Payer: MEDICARE

## 2024-10-01 VITALS
BODY MASS INDEX: 29.98 KG/M2 | SYSTOLIC BLOOD PRESSURE: 131 MMHG | RESPIRATION RATE: 18 BRPM | TEMPERATURE: 97.7 F | HEIGHT: 67 IN | OXYGEN SATURATION: 97 % | WEIGHT: 191 LBS | DIASTOLIC BLOOD PRESSURE: 85 MMHG

## 2024-10-01 DIAGNOSIS — Z11.59 ENCOUNTER FOR HEPATITIS C SCREENING TEST FOR LOW RISK PATIENT: ICD-10-CM

## 2024-10-01 DIAGNOSIS — R79.89 ELEVATED SERUM CREATININE: ICD-10-CM

## 2024-10-01 DIAGNOSIS — Z00.00 ROUTINE GENERAL MEDICAL EXAMINATION AT HEALTH CARE FACILITY: ICD-10-CM

## 2024-10-01 DIAGNOSIS — E55.9 VITAMIN D DEFICIENCY: ICD-10-CM

## 2024-10-01 DIAGNOSIS — Z23 ENCOUNTER FOR IMMUNIZATION: ICD-10-CM

## 2024-10-01 DIAGNOSIS — Z71.89 ADVANCED DIRECTIVES, COUNSELING/DISCUSSION: ICD-10-CM

## 2024-10-01 DIAGNOSIS — E03.9 ACQUIRED HYPOTHYROIDISM: ICD-10-CM

## 2024-10-01 DIAGNOSIS — I10 PRIMARY HYPERTENSION: ICD-10-CM

## 2024-10-01 DIAGNOSIS — F17.210 CIGARETTE SMOKER: Primary | ICD-10-CM

## 2024-10-01 DIAGNOSIS — M54.50 CHRONIC BILATERAL LOW BACK PAIN WITHOUT SCIATICA: ICD-10-CM

## 2024-10-01 DIAGNOSIS — E78.2 MIXED HYPERLIPIDEMIA: ICD-10-CM

## 2024-10-01 DIAGNOSIS — G89.29 CHRONIC BILATERAL LOW BACK PAIN WITHOUT SCIATICA: ICD-10-CM

## 2024-10-01 PROCEDURE — G0008 ADMIN INFLUENZA VIRUS VAC: HCPCS | Performed by: FAMILY MEDICINE

## 2024-10-01 PROCEDURE — 1123F ACP DISCUSS/DSCN MKR DOCD: CPT | Performed by: FAMILY MEDICINE

## 2024-10-01 PROCEDURE — 91322 SARSCOV2 VAC 50 MCG/0.5ML IM: CPT | Performed by: FAMILY MEDICINE

## 2024-10-01 PROCEDURE — 1157F ADVNC CARE PLAN IN RCRD: CPT | Performed by: FAMILY MEDICINE

## 2024-10-01 PROCEDURE — 4004F PT TOBACCO SCREEN RCVD TLK: CPT | Performed by: FAMILY MEDICINE

## 2024-10-01 PROCEDURE — 3075F SYST BP GE 130 - 139MM HG: CPT | Performed by: FAMILY MEDICINE

## 2024-10-01 PROCEDURE — 3079F DIAST BP 80-89 MM HG: CPT | Performed by: FAMILY MEDICINE

## 2024-10-01 PROCEDURE — 1158F ADVNC CARE PLAN TLK DOCD: CPT | Performed by: FAMILY MEDICINE

## 2024-10-01 PROCEDURE — 3008F BODY MASS INDEX DOCD: CPT | Performed by: FAMILY MEDICINE

## 2024-10-01 PROCEDURE — 1160F RVW MEDS BY RX/DR IN RCRD: CPT | Performed by: FAMILY MEDICINE

## 2024-10-01 PROCEDURE — 1159F MED LIST DOCD IN RCRD: CPT | Performed by: FAMILY MEDICINE

## 2024-10-01 PROCEDURE — 99497 ADVNCD CARE PLAN 30 MIN: CPT | Performed by: FAMILY MEDICINE

## 2024-10-01 PROCEDURE — 1170F FXNL STATUS ASSESSED: CPT | Performed by: FAMILY MEDICINE

## 2024-10-01 PROCEDURE — 90480 ADMN SARSCOV2 VAC 1/ONLY CMP: CPT | Performed by: FAMILY MEDICINE

## 2024-10-01 PROCEDURE — 99214 OFFICE O/P EST MOD 30 MIN: CPT | Performed by: FAMILY MEDICINE

## 2024-10-01 PROCEDURE — G0439 PPPS, SUBSEQ VISIT: HCPCS | Performed by: FAMILY MEDICINE

## 2024-10-01 PROCEDURE — 90662 IIV NO PRSV INCREASED AG IM: CPT | Performed by: FAMILY MEDICINE

## 2024-10-01 RX ORDER — LEVOTHYROXINE SODIUM 125 UG/1
62.5 TABLET ORAL DAILY
Qty: 45 TABLET | Refills: 1 | Status: SHIPPED | OUTPATIENT
Start: 2024-10-01 | End: 2025-03-30

## 2024-10-01 RX ORDER — TRIAMTERENE/HYDROCHLOROTHIAZID 37.5-25 MG
1 TABLET ORAL DAILY
Qty: 90 TABLET | Refills: 3 | Status: SHIPPED | OUTPATIENT
Start: 2024-10-01 | End: 2025-09-26

## 2024-10-01 RX ORDER — LORAZEPAM 0.5 MG/1
0.5 TABLET ORAL 3 TIMES DAILY
COMMUNITY

## 2024-10-01 ASSESSMENT — ENCOUNTER SYMPTOMS
PHOTOPHOBIA: 0
CONFUSION: 0
TINGLING: 0
BRUISES/BLEEDS EASILY: 0
COUGH: 0
DIZZINESS: 0
ACTIVITY CHANGE: 0
TROUBLE SWALLOWING: 0
BLOOD IN STOOL: 0
PARESTHESIAS: 0
PARESIS: 0
CHOKING: 0
SHORTNESS OF BREATH: 0
LOSS OF SENSATION IN FEET: 0
CHEST TIGHTNESS: 0
HEADACHES: 0
POLYDIPSIA: 0
ABDOMINAL PAIN: 0
SPEECH DIFFICULTY: 0
FATIGUE: 0
SEIZURES: 0
MYALGIAS: 0
HALLUCINATIONS: 0
ADENOPATHY: 0
EYE PAIN: 0
LIGHT-HEADEDNESS: 0
NERVOUS/ANXIOUS: 0
JOINT SWELLING: 0
WHEEZING: 0
CONSTIPATION: 0
SORE THROAT: 0
BACK PAIN: 1
NECK STIFFNESS: 0
EYE REDNESS: 0
OCCASIONAL FEELINGS OF UNSTEADINESS: 0
BOWEL INCONTINENCE: 0
TREMORS: 0
WEAKNESS: 0
EYE DISCHARGE: 0
WOUND: 0
DYSPHORIC MOOD: 0
NUMBNESS: 0
HYPERTENSION: 1
UNEXPECTED WEIGHT CHANGE: 1
RHINORRHEA: 0
VOICE CHANGE: 0
SLEEP DISTURBANCE: 0
PALPITATIONS: 0
DEPRESSION: 0
AGITATION: 0
NECK PAIN: 0
EYE ITCHING: 0
APPETITE CHANGE: 0
FACIAL ASYMMETRY: 0
DIARRHEA: 0
DIAPHORESIS: 0
SINUS PRESSURE: 0
ABDOMINAL DISTENTION: 0
ARTHRALGIAS: 0

## 2024-10-01 ASSESSMENT — ACTIVITIES OF DAILY LIVING (ADL)
TAKING_MEDICATION: INDEPENDENT
DOING_HOUSEWORK: INDEPENDENT
GROCERY_SHOPPING: INDEPENDENT
BATHING: INDEPENDENT
DRESSING: INDEPENDENT
MANAGING_FINANCES: INDEPENDENT

## 2024-10-01 ASSESSMENT — PATIENT HEALTH QUESTIONNAIRE - PHQ9
SUM OF ALL RESPONSES TO PHQ9 QUESTIONS 1 AND 2: 0
1. LITTLE INTEREST OR PLEASURE IN DOING THINGS: NOT AT ALL
2. FEELING DOWN, DEPRESSED OR HOPELESS: NOT AT ALL

## 2024-10-01 NOTE — PROGRESS NOTES
Subjective   Patient ID: Marianna Crum is a 64 y.o. female who presents for Medicare Annual Wellness Visit Subsequent (And review labs ) and Weight Gain (Stopped smoking ).    Patient is presenting today for Medicare wellness visit. She is most concerned about her ~40lb weight gain in the past year. Pt quit smoking in mach 2024, since then she her weight has steadily increased. She notes that she has been eating out more frequently and she has been drinking more.  She is now having 2 drinks (liquor; mixed) 5 days a week. With this weight gain the patient has also noticed an increase in back pain while working. The back pain worsens with activity and is relieved with rest and NSAIDS. The patient takes 800-1000mg of Ibuprofen every day.     Hypertension  This is a chronic problem. The current episode started more than 1 year ago. The problem is unchanged. The problem is controlled. Pertinent negatives include no chest pain, headaches, neck pain, palpitations or shortness of breath. Agents associated with hypertension include thyroid hormones. Past treatments include diuretics. The current treatment provides significant improvement. There are no compliance problems.  Identifiable causes of hypertension include a thyroid problem. There is no history of chronic renal disease.   Back Pain  This is a chronic problem. The current episode started more than 1 year ago. The problem occurs constantly. The pain is present in the lumbar spine and thoracic spine. The quality of the pain is described as shooting, stabbing and aching. The pain is at a severity of 7/10. The pain is Worse during the day. The symptoms are aggravated by standing, twisting and stress. Pertinent negatives include no abdominal pain, bowel incontinence, chest pain, headaches, numbness, paresis, paresthesias, tingling or weakness. Risk factors include menopause and history of cancer. Treatments tried: rest intermittent. The treatment provided mild relief.    Thyroid Problem  Patient reports no anxiety, cold intolerance, constipation, diaphoresis, diarrhea, fatigue, heat intolerance, palpitations or tremors. Her past medical history is significant for hyperlipidemia. There is no history of diabetes.   Hyperlipidemia  This is a chronic problem. Recent lipid tests were reviewed and are high. Exacerbating diseases include hypothyroidism. She has no history of chronic renal disease, diabetes, liver disease, obesity or nephrotic syndrome. Factors aggravating her hyperlipidemia include thiazides. Pertinent negatives include no chest pain, myalgias or shortness of breath. Current antihyperlipidemic treatment includes diet change and exercise. The current treatment provides mild improvement of lipids. There are no compliance problems.  Risk factors for coronary artery disease include dyslipidemia, hypertension and post-menopausal.        Review of Systems   Constitutional:  Positive for unexpected weight change. Negative for activity change, appetite change, diaphoresis and fatigue.   HENT:  Negative for congestion, ear pain, hearing loss, nosebleeds, postnasal drip, rhinorrhea, sinus pressure, sneezing, sore throat, tinnitus, trouble swallowing and voice change.    Eyes:  Negative for photophobia, pain, discharge, redness, itching and visual disturbance.   Respiratory:  Negative for cough, choking, chest tightness, shortness of breath and wheezing.    Cardiovascular:  Negative for chest pain, palpitations and leg swelling.   Gastrointestinal:  Negative for abdominal distention, abdominal pain, blood in stool, bowel incontinence, constipation and diarrhea.   Endocrine: Negative for cold intolerance, heat intolerance, polydipsia and polyuria.   Musculoskeletal:  Positive for back pain. Negative for arthralgias, joint swelling, myalgias, neck pain and neck stiffness.   Skin:  Negative for rash and wound.   Allergic/Immunologic: Negative for immunocompromised state.  "  Neurological:  Negative for dizziness, tingling, tremors, seizures, syncope, facial asymmetry, speech difficulty, weakness, light-headedness, numbness, headaches and paresthesias.   Hematological:  Negative for adenopathy. Does not bruise/bleed easily.   Psychiatric/Behavioral:  Negative for agitation, behavioral problems, confusion, dysphoric mood, hallucinations, self-injury, sleep disturbance and suicidal ideas. The patient is not nervous/anxious.        Objective   /85 (BP Location: Right arm, Patient Position: Sitting, BP Cuff Size: Large adult)   Temp 36.5 °C (97.7 °F) (Temporal)   Resp 18   Ht 1.689 m (5' 6.5\")   Wt 86.6 kg (191 lb)   SpO2 97%   BMI 30.37 kg/m²     Physical Exam  Constitutional:       General: She is not in acute distress.     Appearance: She is not ill-appearing or diaphoretic.   HENT:      Head: Normocephalic and atraumatic.      Right Ear: External ear normal.      Left Ear: External ear normal.      Nose: Nose normal. No rhinorrhea.   Eyes:      General: Lids are normal. No scleral icterus.        Right eye: No discharge.         Left eye: No discharge.      Conjunctiva/sclera: Conjunctivae normal.   Cardiovascular:      Rate and Rhythm: Normal rate and regular rhythm.      Pulses: Normal pulses.      Heart sounds: No murmur heard.  Pulmonary:      Effort: Pulmonary effort is normal. No respiratory distress.      Breath sounds: No decreased breath sounds, wheezing, rhonchi or rales.   Abdominal:      General: Bowel sounds are normal. There is no distension.      Palpations: Abdomen is soft. There is no mass.      Tenderness: There is no abdominal tenderness. There is no guarding or rebound.   Musculoskeletal:         General: Tenderness present. No swelling or deformity.      Cervical back: No rigidity.      Thoracic back: Tenderness present.      Lumbar back: Tenderness present.      Right lower leg: No edema.      Left lower leg: No edema.   Lymphadenopathy:      Cervical: " No cervical adenopathy.      Upper Body:      Right upper body: No supraclavicular adenopathy.      Left upper body: No supraclavicular adenopathy.   Skin:     General: Skin is warm and dry.      Coloration: Skin is not jaundiced or pale.      Findings: No erythema, lesion or rash.   Neurological:      General: No focal deficit present.      Mental Status: She is alert and oriented to person, place, and time.      Sensory: No sensory deficit.      Motor: No weakness or tremor.      Coordination: Coordination normal.      Gait: Gait normal.   Psychiatric:         Mood and Affect: Mood normal. Affect is not inappropriate.         Behavior: Behavior normal.         Assessment/Plan     #Weight Gain  #Hypothyroidism  - 37lb weight gain since 09/12/2023  - TSH slightly elevated 4.04  - Patient eating out more frequently and drinking more alcoholic beverages; counseled patient about cutting back alcohol use and diet changes  - Weight gain suspected to be 2/2 eating and drinking habits vs hypothyroidism  - Increased Levothyroxine to 62.5mg    #Back Pain  - Dull aching back pain with working  - Taking 800-1000 mg ibuprofen a day  - Counseled about switched from ibuprofen to tylenol  - Refer to PT    #ANSON  - Baseline Cr 0.91; Most recent 1.31  - Suspected related to chronic NSAID use for back pain, counseled patient about stopping NSAID use  - Repeat labs    Diagnoses and all orders for this visit:  Cigarette smoker  -     CT lung screening low dose; Future  Encounter for hepatitis C screening test for low risk patient  -     Hepatitis C antibody; Future  Primary hypertension  -     triamterene-hydrochlorothiazid (Maxzide-25) 37.5-25 mg tablet; Take 1 tablet by mouth once daily.  -     Parathyroid Hormone, Intact; Future  -     Albumin-Creatinine Ratio, Urine Random; Future  -     CBC and Auto Differential; Future  -     Comprehensive Metabolic Panel; Future  -     Lipid Panel; Future  -     Magnesium; Future  -     TSH with  reflex to Free T4 if abnormal; Future  -     Follow Up In [facility] - PCP - Established; Future  Routine general medical examination at health care facility  Encounter for immunization  -     Flu vaccine, trivalent, preservative free, HIGH-DOSE, age 65y+ (Fluzone)  -     Moderna COVID-19 vaccine, monovalent, age 12 years and older, (50mcg/0.5mL)(Spikevax)  -     diphth,pertus,acell,,tetanus (BoostRIX) 2.5-8-5 Lf-mcg-Lf/0.5mL injection; Inject 0.5 mL into the muscle 1 time for 1 dose.  Mixed hyperlipidemia  -     Comprehensive Metabolic Panel; Future  -     Lipid Panel; Future  -     TSH with reflex to Free T4 if abnormal; Future  -     Follow Up In [facility] - PCP - Established; Future  Acquired hypothyroidism  -     levothyroxine (Synthroid, Levoxyl) 125 mcg tablet; Take 0.5 tablets (62.5 mcg) by mouth once daily.  -     Comprehensive Metabolic Panel; Future  -     Lipid Panel; Future  -     TSH with reflex to Free T4 if abnormal; Future  -     Follow Up In [facility] - PCP - Established; Future  Elevated serum creatinine  -     Parathyroid Hormone, Intact; Future  -     Albumin-Creatinine Ratio, Urine Random; Future  -     Phosphorus; Future  -     Uric Acid; Future  -     Vitamin D 25-Hydroxy,Total (for eval of Vitamin D levels); Future  -     Comprehensive Metabolic Panel; Future  -     Magnesium; Future  -     TSH with reflex to Free T4 if abnormal; Future  -     POCT UA Automated manually resulted  -     Follow Up In [facility] - PCP - Established; Future  Chronic bilateral low back pain without sciatica  -     Referral to Physical Therapy; Future  Advanced directives, counseling/discussion  -     Full code  Vitamin D deficiency  -     Vitamin D 25-Hydroxy,Total (for eval of Vitamin D levels); Future    Return to clinic in 1 mo     Patient was seen and evaluated with student. I was present for critical portion of history and physical exam. I reviewed note with  student and agree with findings as written  TJW DO advance care planning was discussed including living will, power of  for healthcare and living will.  Advance care planning packet will be provided to patient at discharge.  Patient was encouraged to bring in any advanced care planning paperwork to file on the chart at their own convenience.  (16min spent discussing above)

## 2024-10-01 NOTE — PROGRESS NOTES
Patient was seen and evaluated with student. I was present for critical portion of history and physical exam. I reviewed note with student and agree with findings as written  TJW DO   advance care planning was discussed including living will, power of  for healthcare and living will.  Advance care planning packet will be provided to patient at discharge.  Patient was encouraged to bring in any advanced care planning paperwork to file on the chart at their own convenience.  (16min spent discussing above)

## 2024-10-22 ENCOUNTER — HOSPITAL ENCOUNTER (OUTPATIENT)
Dept: RADIOLOGY | Facility: CLINIC | Age: 64
Discharge: HOME | End: 2024-10-22
Payer: MEDICARE

## 2024-10-22 DIAGNOSIS — F17.210 CIGARETTE SMOKER: ICD-10-CM

## 2024-10-22 PROCEDURE — 71271 CT THORAX LUNG CANCER SCR C-: CPT | Performed by: RADIOLOGY

## 2024-10-22 PROCEDURE — 71271 CT THORAX LUNG CANCER SCR C-: CPT

## 2024-11-05 ENCOUNTER — APPOINTMENT (OUTPATIENT)
Dept: PRIMARY CARE | Facility: CLINIC | Age: 64
End: 2024-11-05
Payer: MEDICARE

## 2024-11-15 ENCOUNTER — LAB (OUTPATIENT)
Dept: LAB | Facility: LAB | Age: 64
End: 2024-11-15
Payer: MEDICARE

## 2024-11-15 DIAGNOSIS — I10 PRIMARY HYPERTENSION: ICD-10-CM

## 2024-11-15 DIAGNOSIS — E78.2 MIXED HYPERLIPIDEMIA: ICD-10-CM

## 2024-11-15 DIAGNOSIS — Z11.59 ENCOUNTER FOR HEPATITIS C SCREENING TEST FOR LOW RISK PATIENT: ICD-10-CM

## 2024-11-15 DIAGNOSIS — E03.9 ACQUIRED HYPOTHYROIDISM: ICD-10-CM

## 2024-11-15 DIAGNOSIS — R79.89 ELEVATED SERUM CREATININE: ICD-10-CM

## 2024-11-15 DIAGNOSIS — E55.9 VITAMIN D DEFICIENCY: ICD-10-CM

## 2024-11-15 LAB
25(OH)D3 SERPL-MCNC: 16 NG/ML (ref 30–100)
ALBUMIN SERPL BCP-MCNC: 4.2 G/DL (ref 3.4–5)
ALP SERPL-CCNC: 45 U/L (ref 33–136)
ALT SERPL W P-5'-P-CCNC: 24 U/L (ref 7–45)
ANION GAP SERPL CALC-SCNC: 12 MMOL/L (ref 10–20)
AST SERPL W P-5'-P-CCNC: 18 U/L (ref 9–39)
BASOPHILS # BLD AUTO: 0.06 X10*3/UL (ref 0–0.1)
BASOPHILS NFR BLD AUTO: 1.5 %
BILIRUB SERPL-MCNC: 0.4 MG/DL (ref 0–1.2)
BUN SERPL-MCNC: 18 MG/DL (ref 6–23)
CALCIUM SERPL-MCNC: 8.9 MG/DL (ref 8.6–10.3)
CHLORIDE SERPL-SCNC: 106 MMOL/L (ref 98–107)
CHOLEST SERPL-MCNC: 248 MG/DL (ref 0–199)
CHOLESTEROL/HDL RATIO: 3.5
CO2 SERPL-SCNC: 25 MMOL/L (ref 21–32)
CREAT SERPL-MCNC: 1.08 MG/DL (ref 0.5–1.05)
EGFRCR SERPLBLD CKD-EPI 2021: 57 ML/MIN/1.73M*2
EOSINOPHIL # BLD AUTO: 0.11 X10*3/UL (ref 0–0.7)
EOSINOPHIL NFR BLD AUTO: 2.7 %
ERYTHROCYTE [DISTWIDTH] IN BLOOD BY AUTOMATED COUNT: 12.9 % (ref 11.5–14.5)
GLUCOSE SERPL-MCNC: 108 MG/DL (ref 74–99)
HCT VFR BLD AUTO: 40.6 % (ref 36–46)
HCV AB SER QL: NONREACTIVE
HDLC SERPL-MCNC: 70.3 MG/DL
HGB BLD-MCNC: 13.2 G/DL (ref 12–16)
IMM GRANULOCYTES # BLD AUTO: 0.01 X10*3/UL (ref 0–0.7)
IMM GRANULOCYTES NFR BLD AUTO: 0.2 % (ref 0–0.9)
LDLC SERPL CALC-MCNC: 128 MG/DL
LYMPHOCYTES # BLD AUTO: 1.44 X10*3/UL (ref 1.2–4.8)
LYMPHOCYTES NFR BLD AUTO: 35.1 %
MAGNESIUM SERPL-MCNC: 1.99 MG/DL (ref 1.6–2.4)
MCH RBC QN AUTO: 31 PG (ref 26–34)
MCHC RBC AUTO-ENTMCNC: 32.5 G/DL (ref 32–36)
MCV RBC AUTO: 95 FL (ref 80–100)
MONOCYTES # BLD AUTO: 0.39 X10*3/UL (ref 0.1–1)
MONOCYTES NFR BLD AUTO: 9.5 %
NEUTROPHILS # BLD AUTO: 2.09 X10*3/UL (ref 1.2–7.7)
NEUTROPHILS NFR BLD AUTO: 51 %
NON HDL CHOLESTEROL: 178 MG/DL (ref 0–149)
NRBC BLD-RTO: 0 /100 WBCS (ref 0–0)
PHOSPHATE SERPL-MCNC: 3.4 MG/DL (ref 2.5–4.9)
PLATELET # BLD AUTO: 215 X10*3/UL (ref 150–450)
POTASSIUM SERPL-SCNC: 4.3 MMOL/L (ref 3.5–5.3)
PROT SERPL-MCNC: 6.4 G/DL (ref 6.4–8.2)
RBC # BLD AUTO: 4.26 X10*6/UL (ref 4–5.2)
SODIUM SERPL-SCNC: 139 MMOL/L (ref 136–145)
T4 FREE SERPL-MCNC: 0.88 NG/DL (ref 0.61–1.12)
TRIGL SERPL-MCNC: 251 MG/DL (ref 0–149)
TSH SERPL-ACNC: 4.23 MIU/L (ref 0.44–3.98)
URATE SERPL-MCNC: 8 MG/DL (ref 2.3–6.7)
VLDL: 50 MG/DL (ref 0–40)
WBC # BLD AUTO: 4.1 X10*3/UL (ref 4.4–11.3)

## 2024-11-15 PROCEDURE — 80053 COMPREHEN METABOLIC PANEL: CPT

## 2024-11-15 PROCEDURE — 82306 VITAMIN D 25 HYDROXY: CPT

## 2024-11-15 PROCEDURE — 84439 ASSAY OF FREE THYROXINE: CPT

## 2024-11-15 PROCEDURE — 84443 ASSAY THYROID STIM HORMONE: CPT

## 2024-11-15 PROCEDURE — 85025 COMPLETE CBC W/AUTO DIFF WBC: CPT

## 2024-11-15 PROCEDURE — G0472 HEP C SCREEN HIGH RISK/OTHER: HCPCS

## 2024-11-15 PROCEDURE — 83735 ASSAY OF MAGNESIUM: CPT

## 2024-11-15 PROCEDURE — 36415 COLL VENOUS BLD VENIPUNCTURE: CPT

## 2024-11-15 PROCEDURE — 80061 LIPID PANEL: CPT

## 2024-11-15 PROCEDURE — 84550 ASSAY OF BLOOD/URIC ACID: CPT

## 2024-11-15 PROCEDURE — 84100 ASSAY OF PHOSPHORUS: CPT

## 2024-11-15 PROCEDURE — 83970 ASSAY OF PARATHORMONE: CPT

## 2024-11-16 LAB — PTH-INTACT SERPL-MCNC: 40.7 PG/ML (ref 18.5–88)

## 2024-11-18 ENCOUNTER — APPOINTMENT (OUTPATIENT)
Dept: PRIMARY CARE | Facility: CLINIC | Age: 64
End: 2024-11-18
Payer: MEDICARE

## 2024-11-18 VITALS
BODY MASS INDEX: 31.23 KG/M2 | HEIGHT: 67 IN | OXYGEN SATURATION: 96 % | TEMPERATURE: 97.7 F | SYSTOLIC BLOOD PRESSURE: 141 MMHG | HEART RATE: 76 BPM | RESPIRATION RATE: 18 BRPM | DIASTOLIC BLOOD PRESSURE: 82 MMHG | WEIGHT: 199 LBS

## 2024-11-18 DIAGNOSIS — E66.811 CLASS 1 OBESITY DUE TO EXCESS CALORIES WITH SERIOUS COMORBIDITY AND BODY MASS INDEX (BMI) OF 31.0 TO 31.9 IN ADULT: ICD-10-CM

## 2024-11-18 DIAGNOSIS — E03.9 ACQUIRED HYPOTHYROIDISM: ICD-10-CM

## 2024-11-18 DIAGNOSIS — E78.2 MIXED HYPERLIPIDEMIA: ICD-10-CM

## 2024-11-18 DIAGNOSIS — R73.02 GLUCOSE INTOLERANCE (IMPAIRED GLUCOSE TOLERANCE): ICD-10-CM

## 2024-11-18 DIAGNOSIS — E55.9 VITAMIN D DEFICIENCY: ICD-10-CM

## 2024-11-18 DIAGNOSIS — E66.09 CLASS 1 OBESITY DUE TO EXCESS CALORIES WITH SERIOUS COMORBIDITY AND BODY MASS INDEX (BMI) OF 31.0 TO 31.9 IN ADULT: ICD-10-CM

## 2024-11-18 DIAGNOSIS — I10 PRIMARY HYPERTENSION: Primary | ICD-10-CM

## 2024-11-18 PROBLEM — J41.0 SIMPLE CHRONIC BRONCHITIS (MULTI): Status: RESOLVED | Noted: 2023-03-07 | Resolved: 2024-11-18

## 2024-11-18 PROBLEM — F17.200 TOBACCO USE DISORDER: Status: RESOLVED | Noted: 2023-03-07 | Resolved: 2024-11-18

## 2024-11-18 PROCEDURE — 4004F PT TOBACCO SCREEN RCVD TLK: CPT | Performed by: FAMILY MEDICINE

## 2024-11-18 PROCEDURE — 1123F ACP DISCUSS/DSCN MKR DOCD: CPT | Performed by: FAMILY MEDICINE

## 2024-11-18 PROCEDURE — 3079F DIAST BP 80-89 MM HG: CPT | Performed by: FAMILY MEDICINE

## 2024-11-18 PROCEDURE — 1159F MED LIST DOCD IN RCRD: CPT | Performed by: FAMILY MEDICINE

## 2024-11-18 PROCEDURE — 1160F RVW MEDS BY RX/DR IN RCRD: CPT | Performed by: FAMILY MEDICINE

## 2024-11-18 PROCEDURE — 3008F BODY MASS INDEX DOCD: CPT | Performed by: FAMILY MEDICINE

## 2024-11-18 PROCEDURE — 1157F ADVNC CARE PLAN IN RCRD: CPT | Performed by: FAMILY MEDICINE

## 2024-11-18 PROCEDURE — 99214 OFFICE O/P EST MOD 30 MIN: CPT | Performed by: FAMILY MEDICINE

## 2024-11-18 PROCEDURE — 3077F SYST BP >= 140 MM HG: CPT | Performed by: FAMILY MEDICINE

## 2024-11-18 RX ORDER — ERGOCALCIFEROL 1.25 MG/1
50000 CAPSULE ORAL WEEKLY
Qty: 12 CAPSULE | Refills: 0 | Status: SHIPPED | OUTPATIENT
Start: 2024-11-18 | End: 2025-02-10

## 2024-11-18 RX ORDER — LEVOTHYROXINE SODIUM 75 UG/1
75 TABLET ORAL DAILY
Qty: 90 TABLET | Refills: 1 | Status: SHIPPED | OUTPATIENT
Start: 2024-11-18 | End: 2025-05-17

## 2024-11-18 RX ORDER — AMLODIPINE BESYLATE 2.5 MG/1
2.5 TABLET ORAL DAILY
Qty: 90 TABLET | Refills: 3 | Status: SHIPPED | OUTPATIENT
Start: 2024-11-18 | End: 2025-11-18

## 2024-11-18 RX ORDER — ACETAMINOPHEN 500 MG
2000 TABLET ORAL DAILY
Qty: 90 CAPSULE | Refills: 3 | Status: SHIPPED | OUTPATIENT
Start: 2024-11-18 | End: 2025-11-18

## 2024-11-18 ASSESSMENT — ENCOUNTER SYMPTOMS
CONFUSION: 0
DIZZINESS: 0
ACTIVITY CHANGE: 0
CHILLS: 0
FATIGUE: 0
APPETITE CHANGE: 0
TREMORS: 0
FEVER: 0
NECK PAIN: 0
BLOOD IN STOOL: 0
DIARRHEA: 0
DYSPHORIC MOOD: 0
RHINORRHEA: 0
SLEEP DISTURBANCE: 0
NUMBNESS: 0
TROUBLE SWALLOWING: 0
SORE THROAT: 0
HEADACHES: 0
DECREASED CONCENTRATION: 0
SEIZURES: 0
POLYDIPSIA: 0
NAUSEA: 0
UNEXPECTED WEIGHT CHANGE: 0
WOUND: 0
FACIAL ASYMMETRY: 0
SHORTNESS OF BREATH: 0
EYE ITCHING: 0
BACK PAIN: 0
CHOKING: 0
LIGHT-HEADEDNESS: 0
BRUISES/BLEEDS EASILY: 0
EYE DISCHARGE: 0
CONSTIPATION: 0
HALLUCINATIONS: 0
DYSURIA: 0
ADENOPATHY: 0
PHOTOPHOBIA: 0
WEAKNESS: 0
FLANK PAIN: 0
FREQUENCY: 0
COUGH: 0
VOMITING: 0
SPEECH DIFFICULTY: 0
ARTHRALGIAS: 0
JOINT SWELLING: 0
SINUS PRESSURE: 0
CHEST TIGHTNESS: 0
ABDOMINAL PAIN: 0
MYALGIAS: 0
HEMATURIA: 0
VOICE CHANGE: 0
EYE REDNESS: 0
EYE PAIN: 0
AGITATION: 0
HYPERTENSION: 1
NECK STIFFNESS: 0
PALPITATIONS: 0
WHEEZING: 0
ABDOMINAL DISTENTION: 0
NERVOUS/ANXIOUS: 0
DIAPHORESIS: 0

## 2024-11-18 NOTE — PROGRESS NOTES
Subjective   Patient ID: Marianna Crum is a 64 y.o. female who presents for 1 month follow up (To review labs ) and Weight Gain (Would like to discuss weight loss options).    Hypertension  This is a chronic problem. The current episode started more than 1 year ago. The problem is unchanged. The problem is controlled. Pertinent negatives include no chest pain, headaches, neck pain, palpitations or shortness of breath. Agents associated with hypertension include thyroid hormones. Past treatments include diuretics. The current treatment provides significant improvement. There are no compliance problems.  Identifiable causes of hypertension include a thyroid problem. There is no history of chronic renal disease.   Thyroid Problem  Presents for follow-up visit. Patient reports no anxiety, cold intolerance, constipation, diaphoresis, diarrhea, fatigue, heat intolerance, palpitations or tremors. Her past medical history is significant for hyperlipidemia. There is no history of diabetes.   Hyperlipidemia  This is a chronic problem. Recent lipid tests were reviewed and are high. Exacerbating diseases include hypothyroidism. She has no history of chronic renal disease, diabetes, liver disease, obesity or nephrotic syndrome. Factors aggravating her hyperlipidemia include thiazides. Pertinent negatives include no chest pain, myalgias or shortness of breath. Current antihyperlipidemic treatment includes diet change and exercise. The current treatment provides mild improvement of lipids. There are no compliance problems.  Risk factors for coronary artery disease include dyslipidemia, hypertension and post-menopausal.        Review of Systems   Constitutional:  Negative for activity change, appetite change, chills, diaphoresis, fatigue, fever and unexpected weight change.   HENT:  Negative for congestion, ear pain, hearing loss, nosebleeds, postnasal drip, rhinorrhea, sinus pressure, sneezing, sore throat, tinnitus, trouble  "swallowing and voice change.    Eyes:  Negative for photophobia, pain, discharge, redness, itching and visual disturbance.   Respiratory:  Negative for cough, choking, chest tightness, shortness of breath and wheezing.    Cardiovascular:  Negative for chest pain, palpitations and leg swelling.   Gastrointestinal:  Negative for abdominal distention, abdominal pain, blood in stool, constipation, diarrhea, nausea and vomiting.   Endocrine: Negative for cold intolerance, heat intolerance, polydipsia and polyuria.   Genitourinary:  Negative for dysuria, flank pain, frequency, hematuria and urgency.   Musculoskeletal:  Negative for arthralgias, back pain, joint swelling, myalgias, neck pain and neck stiffness.   Skin:  Negative for rash and wound.   Allergic/Immunologic: Negative for immunocompromised state.   Neurological:  Negative for dizziness, tremors, seizures, syncope, facial asymmetry, speech difficulty, weakness, light-headedness, numbness and headaches.   Hematological:  Negative for adenopathy. Does not bruise/bleed easily.   Psychiatric/Behavioral:  Negative for agitation, behavioral problems, confusion, decreased concentration, dysphoric mood, hallucinations, self-injury, sleep disturbance and suicidal ideas. The patient is not nervous/anxious.        Objective   /82 (BP Location: Right arm, Patient Position: Sitting, BP Cuff Size: Large adult)   Pulse 76   Temp 36.5 °C (97.7 °F) (Temporal)   Resp 18   Ht 1.689 m (5' 6.5\")   Wt 90.3 kg (199 lb)   SpO2 96%   BMI 31.64 kg/m²     Physical Exam  Constitutional:       General: She is not in acute distress.     Appearance: She is not ill-appearing or diaphoretic.   HENT:      Head: Normocephalic and atraumatic.      Right Ear: External ear normal.      Left Ear: External ear normal.      Nose: Nose normal. No rhinorrhea.   Eyes:      General: Lids are normal. No scleral icterus.        Right eye: No discharge.         Left eye: No discharge.      " Conjunctiva/sclera: Conjunctivae normal.   Cardiovascular:      Rate and Rhythm: Normal rate and regular rhythm.      Pulses: Normal pulses.      Heart sounds: No murmur heard.  Pulmonary:      Effort: Pulmonary effort is normal. No respiratory distress.      Breath sounds: No decreased breath sounds, wheezing, rhonchi or rales.   Abdominal:      General: Bowel sounds are normal. There is no distension.      Palpations: Abdomen is soft. There is no mass.      Tenderness: There is no abdominal tenderness. There is no guarding or rebound.   Musculoskeletal:         General: No swelling, tenderness or deformity.      Cervical back: No rigidity or tenderness.      Right lower leg: No edema.      Left lower leg: No edema.   Lymphadenopathy:      Cervical: No cervical adenopathy.      Upper Body:      Right upper body: No supraclavicular adenopathy.      Left upper body: No supraclavicular adenopathy.   Skin:     General: Skin is warm and dry.      Coloration: Skin is not jaundiced or pale.      Findings: No erythema, lesion or rash.   Neurological:      General: No focal deficit present.      Mental Status: She is alert and oriented to person, place, and time.      Sensory: No sensory deficit.      Motor: No weakness or tremor.      Coordination: Coordination normal.      Gait: Gait normal.   Psychiatric:         Attention and Perception: Attention normal. She is attentive.         Mood and Affect: Mood normal. Affect is not inappropriate.         Speech: Speech normal.         Behavior: Behavior normal.         Thought Content: Thought content normal.         Cognition and Memory: Cognition is not impaired. Memory is not impaired. She does not exhibit impaired recent memory or impaired remote memory.         Assessment/Plan   Diagnoses and all orders for this visit:  Primary hypertension  -     Follow Up In [facility] - PCP - Established  -     amLODIPine (Norvasc) 2.5 mg tablet; Take 1 tablet (2.5 mg) by  mouth once daily.  -     Albumin-Creatinine Ratio, Urine Random; Future  -     CBC and Auto Differential; Future  -     Comprehensive Metabolic Panel; Future  -     Lipid Panel; Future  -     Magnesium; Future  -     TSH with reflex to Free T4 if abnormal; Future  -     Follow Up In [facility] - PCP - Medicare Annual; Future  Mixed hyperlipidemia  -     Follow Up In [facility]  -     Comprehensive Metabolic Panel; Future  -     Lipid Panel; Future  -     TSH with reflex to Free T4 if abnormal; Future  -     Follow Up In [facility] - PCP - Medicare Annual; Future  Acquired hypothyroidism  -     Follow Up In [facility]  -     levothyroxine (Synthroid, Levoxyl) 75 mcg tablet; Take 1 tablet (75 mcg) by mouth once daily.  -     Comprehensive Metabolic Panel; Future  -     Lipid Panel; Future  -     TSH with reflex to Free T4 if abnormal; Future  -     Follow Up In [facility] - PCP - Medicare Annual; Future  Class 1 obesity due to excess calories with serious comorbidity and body mass index (BMI) of 31.0 to 31.9 in adult  -     Albumin-Creatinine Ratio, Urine Random; Future  -     CBC and Auto Differential; Future  -     Comprehensive Metabolic Panel; Future  -     Hemoglobin A1C; Future  -     Lipid Panel; Future  -     Magnesium; Future  -     TSH with reflex to Free T4 if abnormal; Future  -     Vitamin D 25-Hydroxy,Total (for eval of Vitamin D levels); Future  -     Uric Acid; Future  -     Follow Up In [facility] - PCP - Medicare Annual; Future  Vitamin D deficiency  -     Vitamin D 25-Hydroxy,Total (for eval of Vitamin D levels); Future  -     Uric Acid; Future  -     cholecalciferol (Vitamin D3) 50 mcg (2,000 unit) capsule; Take 1 capsule (50 mcg) by mouth once daily.  -     ergocalciferol (Vitamin D-2) 1.25 MG (85317 UT) capsule; Take 1 capsule (50,000 Units) by mouth 1 (one) time per week.  -     Follow Up In  [facility] - PCP - Medicare Annual; Future  Glucose intolerance (impaired glucose tolerance)  -     Albumin-Creatinine Ratio, Urine Random; Future  -     Comprehensive Metabolic Panel; Future  -     Hemoglobin A1C; Future  -     Lipid Panel; Future  -     Magnesium; Future  -     TSH with reflex to Free T4 if abnormal; Future

## 2025-05-12 ENCOUNTER — TELEPHONE (OUTPATIENT)
Dept: PRIMARY CARE | Facility: CLINIC | Age: 65
End: 2025-05-12
Payer: MEDICARE

## 2025-05-12 DIAGNOSIS — R30.0 DYSURIA: Primary | ICD-10-CM

## 2025-05-12 DIAGNOSIS — E03.9 ACQUIRED HYPOTHYROIDISM: ICD-10-CM

## 2025-05-15 RX ORDER — LEVOTHYROXINE SODIUM 75 UG/1
75 TABLET ORAL DAILY
Qty: 30 TABLET | Refills: 0 | Status: SHIPPED | OUTPATIENT
Start: 2025-05-15 | End: 2025-06-14

## 2025-05-20 ENCOUNTER — APPOINTMENT (OUTPATIENT)
Dept: PRIMARY CARE | Facility: CLINIC | Age: 65
End: 2025-05-20
Payer: MEDICARE

## 2025-05-20 VITALS
DIASTOLIC BLOOD PRESSURE: 86 MMHG | WEIGHT: 205 LBS | RESPIRATION RATE: 16 BRPM | OXYGEN SATURATION: 95 % | HEART RATE: 87 BPM | HEIGHT: 67 IN | BODY MASS INDEX: 32.18 KG/M2 | SYSTOLIC BLOOD PRESSURE: 138 MMHG | TEMPERATURE: 97.5 F

## 2025-05-20 DIAGNOSIS — I10 PRIMARY HYPERTENSION: Primary | ICD-10-CM

## 2025-05-20 DIAGNOSIS — F31.70 BIPOLAR AFFECTIVE DISORDER IN REMISSION (MULTI): ICD-10-CM

## 2025-05-20 DIAGNOSIS — N30.00 ACUTE CYSTITIS WITHOUT HEMATURIA: ICD-10-CM

## 2025-05-20 DIAGNOSIS — E03.9 ACQUIRED HYPOTHYROIDISM: ICD-10-CM

## 2025-05-20 DIAGNOSIS — Z87.891 FORMER CIGARETTE SMOKER: ICD-10-CM

## 2025-05-20 DIAGNOSIS — E66.09 CLASS 1 OBESITY DUE TO EXCESS CALORIES WITH SERIOUS COMORBIDITY AND BODY MASS INDEX (BMI) OF 32.0 TO 32.9 IN ADULT: ICD-10-CM

## 2025-05-20 DIAGNOSIS — E55.9 VITAMIN D DEFICIENCY: ICD-10-CM

## 2025-05-20 DIAGNOSIS — E66.811 CLASS 1 OBESITY DUE TO EXCESS CALORIES WITH SERIOUS COMORBIDITY AND BODY MASS INDEX (BMI) OF 32.0 TO 32.9 IN ADULT: ICD-10-CM

## 2025-05-20 DIAGNOSIS — Z12.31 ENCOUNTER FOR SCREENING MAMMOGRAM FOR MALIGNANT NEOPLASM OF BREAST: ICD-10-CM

## 2025-05-20 DIAGNOSIS — E78.2 MIXED HYPERLIPIDEMIA: ICD-10-CM

## 2025-05-20 PROBLEM — F31.9 BIPOLAR 1 DISORDER (MULTI): Status: RESOLVED | Noted: 2023-03-07 | Resolved: 2025-05-20

## 2025-05-20 LAB
25(OH)D3+25(OH)D2 SERPL-MCNC: 27 NG/ML (ref 30–100)
ALBUMIN SERPL-MCNC: 4.8 G/DL (ref 3.6–5.1)
ALBUMIN/CREAT UR: 14 MG/G CREAT
ALP SERPL-CCNC: 62 U/L (ref 37–153)
ALT SERPL-CCNC: 24 U/L (ref 6–29)
ANION GAP SERPL CALCULATED.4IONS-SCNC: 9 MMOL/L (CALC) (ref 7–17)
AST SERPL-CCNC: 19 U/L (ref 10–35)
BASOPHILS # BLD AUTO: 40 CELLS/UL (ref 0–200)
BASOPHILS NFR BLD AUTO: 0.7 %
BILIRUB SERPL-MCNC: 0.5 MG/DL (ref 0.2–1.2)
BUN SERPL-MCNC: 19 MG/DL (ref 7–25)
CALCIUM SERPL-MCNC: 9.3 MG/DL (ref 8.6–10.4)
CHLORIDE SERPL-SCNC: 111 MMOL/L (ref 98–110)
CHOLEST SERPL-MCNC: 224 MG/DL
CHOLEST/HDLC SERPL: 3.3 (CALC)
CO2 SERPL-SCNC: 23 MMOL/L (ref 20–32)
CREAT SERPL-MCNC: 1.2 MG/DL (ref 0.5–1.05)
CREAT UR-MCNC: 263 MG/DL (ref 20–275)
EGFRCR SERPLBLD CKD-EPI 2021: 50 ML/MIN/1.73M2
EOSINOPHIL # BLD AUTO: 63 CELLS/UL (ref 15–500)
EOSINOPHIL NFR BLD AUTO: 1.1 %
ERYTHROCYTE [DISTWIDTH] IN BLOOD BY AUTOMATED COUNT: 12.4 % (ref 11–15)
EST. AVERAGE GLUCOSE BLD GHB EST-MCNC: 111 MG/DL
EST. AVERAGE GLUCOSE BLD GHB EST-SCNC: 6.2 MMOL/L
GLUCOSE SERPL-MCNC: 100 MG/DL (ref 65–99)
HBA1C MFR BLD: 5.5 %
HCT VFR BLD AUTO: 41.7 % (ref 35–45)
HDLC SERPL-MCNC: 67 MG/DL
HGB BLD-MCNC: 13.9 G/DL (ref 11.7–15.5)
LDLC SERPL CALC-MCNC: 122 MG/DL (CALC)
LYMPHOCYTES # BLD AUTO: 2086 CELLS/UL (ref 850–3900)
LYMPHOCYTES NFR BLD AUTO: 36.6 %
MAGNESIUM SERPL-MCNC: 2.1 MG/DL (ref 1.5–2.5)
MCH RBC QN AUTO: 32.3 PG (ref 27–33)
MCHC RBC AUTO-ENTMCNC: 33.3 G/DL (ref 32–36)
MCV RBC AUTO: 96.8 FL (ref 80–100)
MICROALBUMIN UR-MCNC: 3.8 MG/DL
MONOCYTES # BLD AUTO: 382 CELLS/UL (ref 200–950)
MONOCYTES NFR BLD AUTO: 6.7 %
NEUTROPHILS # BLD AUTO: 3129 CELLS/UL (ref 1500–7800)
NEUTROPHILS NFR BLD AUTO: 54.9 %
NONHDLC SERPL-MCNC: 157 MG/DL (CALC)
PLATELET # BLD AUTO: 231 THOUSAND/UL (ref 140–400)
PMV BLD REES-ECKER: 10 FL (ref 7.5–12.5)
POTASSIUM SERPL-SCNC: 4.8 MMOL/L (ref 3.5–5.3)
PROT SERPL-MCNC: 7.3 G/DL (ref 6.1–8.1)
RBC # BLD AUTO: 4.31 MILLION/UL (ref 3.8–5.1)
SODIUM SERPL-SCNC: 143 MMOL/L (ref 135–146)
TRIGL SERPL-MCNC: 228 MG/DL
TSH SERPL-ACNC: 3.29 MIU/L (ref 0.4–4.5)
URATE SERPL-MCNC: 9.2 MG/DL (ref 2.5–7)
WBC # BLD AUTO: 5.7 THOUSAND/UL (ref 3.8–10.8)

## 2025-05-20 PROCEDURE — 3075F SYST BP GE 130 - 139MM HG: CPT | Performed by: FAMILY MEDICINE

## 2025-05-20 PROCEDURE — 1036F TOBACCO NON-USER: CPT | Performed by: FAMILY MEDICINE

## 2025-05-20 PROCEDURE — 1159F MED LIST DOCD IN RCRD: CPT | Performed by: FAMILY MEDICINE

## 2025-05-20 PROCEDURE — 99214 OFFICE O/P EST MOD 30 MIN: CPT | Performed by: FAMILY MEDICINE

## 2025-05-20 PROCEDURE — 1157F ADVNC CARE PLAN IN RCRD: CPT | Performed by: FAMILY MEDICINE

## 2025-05-20 PROCEDURE — 3079F DIAST BP 80-89 MM HG: CPT | Performed by: FAMILY MEDICINE

## 2025-05-20 PROCEDURE — 3008F BODY MASS INDEX DOCD: CPT | Performed by: FAMILY MEDICINE

## 2025-05-20 PROCEDURE — 1123F ACP DISCUSS/DSCN MKR DOCD: CPT | Performed by: FAMILY MEDICINE

## 2025-05-20 RX ORDER — ACETAMINOPHEN 325 MG/1
TABLET ORAL EVERY 6 HOURS PRN
COMMUNITY

## 2025-05-20 RX ORDER — LEVOTHYROXINE SODIUM 75 UG/1
75 TABLET ORAL DAILY
Qty: 90 TABLET | Refills: 1 | Status: SHIPPED | OUTPATIENT
Start: 2025-05-20 | End: 2025-11-16

## 2025-05-20 RX ORDER — NITROFURANTOIN 25; 75 MG/1; MG/1
100 CAPSULE ORAL 2 TIMES DAILY
Qty: 14 CAPSULE | Refills: 0 | Status: SHIPPED | OUTPATIENT
Start: 2025-05-20 | End: 2025-05-27

## 2025-05-20 ASSESSMENT — ENCOUNTER SYMPTOMS
RHINORRHEA: 0
HEMATURIA: 0
CHOKING: 0
WOUND: 0
DIZZINESS: 0
LIGHT-HEADEDNESS: 0
POLYDIPSIA: 0
PHOTOPHOBIA: 0
BACK PAIN: 0
HEADACHES: 0
AGITATION: 0
HYPERTENSION: 1
EYE DISCHARGE: 0
MYALGIAS: 0
CHEST TIGHTNESS: 0
ABDOMINAL PAIN: 0
DYSURIA: 0
SHORTNESS OF BREATH: 0
SINUS PRESSURE: 0
NERVOUS/ANXIOUS: 0
EYE PAIN: 0
SLEEP DISTURBANCE: 0
FLANK PAIN: 0
COUGH: 0
ARTHRALGIAS: 0
WEAKNESS: 0
HALLUCINATIONS: 0
PALPITATIONS: 0
FACIAL ASYMMETRY: 0
FATIGUE: 0
DYSPHORIC MOOD: 0
JOINT SWELLING: 0
DIARRHEA: 0
SEIZURES: 0
TREMORS: 0
FEVER: 0
ABDOMINAL DISTENTION: 0
SORE THROAT: 0
VOICE CHANGE: 0
NECK PAIN: 0
VOMITING: 0
ACTIVITY CHANGE: 0
TROUBLE SWALLOWING: 0
ADENOPATHY: 0
NECK STIFFNESS: 0
NAUSEA: 0
DIAPHORESIS: 0
CHILLS: 0
SPEECH DIFFICULTY: 0
UNEXPECTED WEIGHT CHANGE: 0
BLOOD IN STOOL: 0
CONSTIPATION: 0
FREQUENCY: 0
NUMBNESS: 0
EYE ITCHING: 0
APPETITE CHANGE: 0
WHEEZING: 0
BRUISES/BLEEDS EASILY: 0
CONFUSION: 0
EYE REDNESS: 0
DECREASED CONCENTRATION: 0

## 2025-05-20 NOTE — PROGRESS NOTES
Subjective   Patient ID: Marianna Crum is a 65 y.o. female who presents for 6 month check up.    Hypertension  This is a chronic problem. The current episode started more than 1 year ago. The problem is unchanged. The problem is controlled. Pertinent negatives include no chest pain, headaches, neck pain, palpitations or shortness of breath. Agents associated with hypertension include thyroid hormones. Past treatments include diuretics. The current treatment provides significant improvement. There are no compliance problems.  Identifiable causes of hypertension include a thyroid problem. There is no history of chronic renal disease.   Thyroid Problem  Presents for follow-up visit. Patient reports no anxiety, cold intolerance, constipation, diaphoresis, diarrhea, fatigue, heat intolerance, palpitations or tremors. Her past medical history is significant for hyperlipidemia. There is no history of diabetes.   Hyperlipidemia  This is a chronic problem. Recent lipid tests were reviewed and are high. Exacerbating diseases include hypothyroidism. She has no history of chronic renal disease, diabetes, liver disease, obesity or nephrotic syndrome. Factors aggravating her hyperlipidemia include thiazides. Pertinent negatives include no chest pain, myalgias or shortness of breath. Current antihyperlipidemic treatment includes diet change and exercise. The current treatment provides mild improvement of lipids. There are no compliance problems.  Risk factors for coronary artery disease include dyslipidemia, hypertension and post-menopausal.        Review of Systems   Constitutional:  Negative for activity change, appetite change, chills, diaphoresis, fatigue, fever and unexpected weight change.   HENT:  Negative for congestion, ear pain, hearing loss, nosebleeds, postnasal drip, rhinorrhea, sinus pressure, sneezing, sore throat, tinnitus, trouble swallowing and voice change.    Eyes:  Negative for photophobia, pain, discharge,  "redness, itching and visual disturbance.   Respiratory:  Negative for cough, choking, chest tightness, shortness of breath and wheezing.    Cardiovascular:  Negative for chest pain, palpitations and leg swelling.   Gastrointestinal:  Negative for abdominal distention, abdominal pain, blood in stool, constipation, diarrhea, nausea and vomiting.   Endocrine: Negative for cold intolerance, heat intolerance, polydipsia and polyuria.   Genitourinary:  Negative for dysuria, flank pain, frequency, hematuria and urgency.   Musculoskeletal:  Negative for arthralgias, back pain, joint swelling, myalgias, neck pain and neck stiffness.   Skin:  Negative for rash and wound.   Allergic/Immunologic: Negative for immunocompromised state.   Neurological:  Negative for dizziness, tremors, seizures, syncope, facial asymmetry, speech difficulty, weakness, light-headedness, numbness and headaches.   Hematological:  Negative for adenopathy. Does not bruise/bleed easily.   Psychiatric/Behavioral:  Negative for agitation, behavioral problems, confusion, decreased concentration, dysphoric mood, hallucinations, self-injury, sleep disturbance and suicidal ideas. The patient is not nervous/anxious.        Objective   /86 (BP Location: Left arm, Patient Position: Sitting, BP Cuff Size: Large adult)   Pulse 87   Temp 36.4 °C (97.5 °F) (Temporal)   Resp 16   Ht 1.689 m (5' 6.5\")   Wt 93 kg (205 lb)   SpO2 95%   BMI 32.59 kg/m²     Physical Exam  Constitutional:       General: She is not in acute distress.     Appearance: She is not ill-appearing or diaphoretic.   HENT:      Head: Normocephalic and atraumatic.      Right Ear: External ear normal.      Left Ear: External ear normal.      Nose: Nose normal. No rhinorrhea.   Eyes:      General: Lids are normal. No scleral icterus.        Right eye: No discharge.         Left eye: No discharge.      Conjunctiva/sclera: Conjunctivae normal.   Cardiovascular:      Rate and Rhythm: Normal " rate and regular rhythm.      Pulses: Normal pulses.      Heart sounds: No murmur heard.  Pulmonary:      Effort: Pulmonary effort is normal. No respiratory distress.      Breath sounds: No decreased breath sounds, wheezing, rhonchi or rales.   Abdominal:      General: Bowel sounds are normal. There is no distension.      Palpations: Abdomen is soft. There is no mass.      Tenderness: There is no abdominal tenderness. There is no guarding or rebound.   Musculoskeletal:         General: No swelling, tenderness or deformity.      Cervical back: No rigidity or tenderness.      Right lower leg: No edema.      Left lower leg: No edema.   Lymphadenopathy:      Cervical: No cervical adenopathy.      Upper Body:      Right upper body: No supraclavicular adenopathy.      Left upper body: No supraclavicular adenopathy.   Skin:     General: Skin is warm and dry.      Coloration: Skin is not jaundiced or pale.      Findings: No erythema, lesion or rash.   Neurological:      General: No focal deficit present.      Mental Status: She is alert and oriented to person, place, and time.      Sensory: No sensory deficit.      Motor: No weakness or tremor.      Coordination: Coordination normal.      Gait: Gait normal.   Psychiatric:         Attention and Perception: Attention normal. She is attentive.         Mood and Affect: Mood normal. Affect is not inappropriate.         Speech: Speech normal.         Behavior: Behavior normal.         Thought Content: Thought content normal.         Cognition and Memory: Cognition is not impaired. Memory is not impaired. She does not exhibit impaired recent memory or impaired remote memory.         Assessment/Plan   Diagnoses and all orders for this visit:  Primary hypertension  -     Follow Up In [facility] - PCP - Medicare Annual  -     CBC and Auto Differential; Future  -     Comprehensive Metabolic Panel; Future  -     Lipid Panel; Future  -     Magnesium; Future  -     TSH with  reflex to Free T4 if abnormal; Future  -     Albumin-Creatinine Ratio, Urine Random; Future  -     Follow Up In [facility] - PCP - Medicare Annual; Future  Mixed hyperlipidemia  -     Follow Up In [facility] - PCP - Medicare Annual  -     Comprehensive Metabolic Panel; Future  -     Lipid Panel; Future  -     TSH with reflex to Free T4 if abnormal; Future  -     Follow Up In [facility] - PCP - Medicare Annual; Future  Acquired hypothyroidism  -     Follow Up In [facility] - PCP - Medicare Annual  -     levothyroxine (Synthroid, Levoxyl) 75 mcg tablet; Take 1 tablet (75 mcg) by mouth early in the morning..  -     Comprehensive Metabolic Panel; Future  -     Lipid Panel; Future  -     TSH with reflex to Free T4 if abnormal; Future  -     Follow Up In [facility] - PCP - Medicare Annual; Future  Former cigarette smoker  -     CT lung screening low dose; Future  -     Follow Up In [facility] - PCP - Medicare Annual; Future  Vitamin D deficiency  -     Follow Up In [facility] - PCP - Medicare Annual  -     Comprehensive Metabolic Panel; Future  -     Magnesium; Future  -     Vitamin D 25-Hydroxy,Total (for eval of Vitamin D levels); Future  -     Follow Up In [facility] - PCP - Medicare Annual; Future  Encounter for screening mammogram for malignant neoplasm of breast  -     BI mammo bilateral screening tomosynthesis; Future  -     Follow Up In [facility] - PCP - Medicare Annual; Future  Acute cystitis without hematuria  -     nitrofurantoin, macrocrystal-monohydrate, (Macrobid) 100 mg capsule; Take 1 capsule (100 mg) by mouth 2 times a day for 7 days.  -     Follow Up In [facility] - PCP - Medicare Annual; Future  Class 1 obesity due to excess calories with serious comorbidity and body mass index (BMI) of 32.0 to 32.9 in adult  -     CBC and Auto Differential; Future  -     Comprehensive Metabolic Panel;  Future  -     Lipid Panel; Future  -     Magnesium; Future  -     TSH with reflex to Free T4 if abnormal; Future  -     Albumin-Creatinine Ratio, Urine Random; Future  -     Vitamin D 25-Hydroxy,Total (for eval of Vitamin D levels); Future  -     Follow Up In [facility] - PCP - Medicare Annual; Future  Bipolar affective disorder in remission (Multi)  -     Follow Up In [facility] - [facility] - Medicare Annual; Future

## 2025-05-21 LAB
APPEARANCE UR: ABNORMAL
BACTERIA #/AREA URNS HPF: ABNORMAL /HPF
BACTERIA UR CULT: ABNORMAL
BILIRUB UR QL STRIP: NEGATIVE
COLOR UR: YELLOW
GLUCOSE UR QL STRIP: NEGATIVE
HGB UR QL STRIP: ABNORMAL
HYALINE CASTS #/AREA URNS LPF: ABNORMAL /LPF
KETONES UR QL STRIP: ABNORMAL
LEUKOCYTE ESTERASE UR QL STRIP: ABNORMAL
NITRITE UR QL STRIP: POSITIVE
PH UR STRIP: 5.5 [PH] (ref 5–8)
PROT UR QL STRIP: ABNORMAL
RBC #/AREA URNS HPF: ABNORMAL /HPF
SERVICE CMNT-IMP: ABNORMAL
SP GR UR STRIP: 1.03 (ref 1–1.03)
SQUAMOUS #/AREA URNS HPF: ABNORMAL /HPF
WBC #/AREA URNS HPF: ABNORMAL /HPF

## 2025-06-02 ENCOUNTER — HOSPITAL ENCOUNTER (OUTPATIENT)
Dept: RADIOLOGY | Facility: HOSPITAL | Age: 65
Discharge: HOME | End: 2025-06-02
Payer: MEDICARE

## 2025-06-02 VITALS — WEIGHT: 205 LBS | BODY MASS INDEX: 32.59 KG/M2

## 2025-06-02 DIAGNOSIS — Z12.31 ENCOUNTER FOR SCREENING MAMMOGRAM FOR MALIGNANT NEOPLASM OF BREAST: ICD-10-CM

## 2025-06-02 PROCEDURE — 77063 BREAST TOMOSYNTHESIS BI: CPT | Mod: RIGHT SIDE | Performed by: RADIOLOGY

## 2025-06-02 PROCEDURE — 77067 SCR MAMMO BI INCL CAD: CPT | Mod: RIGHT SIDE | Performed by: RADIOLOGY

## 2025-06-02 PROCEDURE — 77067 SCR MAMMO BI INCL CAD: CPT | Mod: 52,RT

## 2025-06-16 DIAGNOSIS — E03.9 ACQUIRED HYPOTHYROIDISM: ICD-10-CM

## 2025-06-16 RX ORDER — LEVOTHYROXINE SODIUM 75 UG/1
75 TABLET ORAL DAILY
Qty: 90 TABLET | Refills: 1 | Status: SHIPPED | OUTPATIENT
Start: 2025-06-16 | End: 2025-12-13

## 2025-06-18 ENCOUNTER — HOSPITAL ENCOUNTER (EMERGENCY)
Age: 65
Discharge: HOME | End: 2025-06-18
Payer: MEDICARE

## 2025-06-18 VITALS
RESPIRATION RATE: 16 BRPM | SYSTOLIC BLOOD PRESSURE: 138 MMHG | OXYGEN SATURATION: 95 % | TEMPERATURE: 97.9 F | HEART RATE: 97 BPM | WEIGHT: 201.4 LBS | BODY MASS INDEX: 31.54 KG/M2 | DIASTOLIC BLOOD PRESSURE: 79 MMHG

## 2025-06-18 DIAGNOSIS — Z23 NEED FOR TETANUS BOOSTER: ICD-10-CM

## 2025-06-18 DIAGNOSIS — S61.215A LACERATION OF LEFT RING FINGER WITHOUT FOREIGN BODY WITHOUT DAMAGE TO NAIL, INITIAL ENCOUNTER: Primary | ICD-10-CM

## 2025-06-18 PROCEDURE — 90714 TD VACC NO PRESV 7 YRS+ IM: CPT

## 2025-06-18 PROCEDURE — 12001 RPR S/N/AX/GEN/TRNK 2.5CM/<: CPT

## 2025-06-18 PROCEDURE — 6360000002 HC RX W HCPCS

## 2025-06-18 PROCEDURE — 90471 IMMUNIZATION ADMIN: CPT

## 2025-06-18 PROCEDURE — 99284 EMERGENCY DEPT VISIT MOD MDM: CPT

## 2025-06-18 PROCEDURE — 6370000000 HC RX 637 (ALT 250 FOR IP)

## 2025-06-18 RX ORDER — CEPHALEXIN 500 MG/1
500 CAPSULE ORAL 3 TIMES DAILY
Qty: 30 CAPSULE | Refills: 0 | Status: SHIPPED | OUTPATIENT
Start: 2025-06-18 | End: 2025-06-28

## 2025-06-18 RX ORDER — BACITRACIN ZINC AND POLYMYXIN B SULFATE 500; 1000 [USP'U]/G; [USP'U]/G
OINTMENT TOPICAL
Qty: 28.4 G | Refills: 0 | Status: SHIPPED | OUTPATIENT
Start: 2025-06-18 | End: 2025-06-25

## 2025-06-18 RX ORDER — LIDOCAINE HYDROCHLORIDE 10 MG/ML
5 INJECTION, SOLUTION EPIDURAL; INFILTRATION; INTRACAUDAL; PERINEURAL ONCE
Status: COMPLETED | OUTPATIENT
Start: 2025-06-18 | End: 2025-06-18

## 2025-06-18 RX ORDER — CEPHALEXIN 500 MG/1
500 CAPSULE ORAL ONCE
Status: COMPLETED | OUTPATIENT
Start: 2025-06-18 | End: 2025-06-18

## 2025-06-18 RX ORDER — BACITRACIN ZINC 500 [USP'U]/G
OINTMENT TOPICAL ONCE
Status: COMPLETED | OUTPATIENT
Start: 2025-06-18 | End: 2025-06-18

## 2025-06-18 RX ADMIN — CLOSTRIDIUM TETANI TOXOID ANTIGEN (FORMALDEHYDE INACTIVATED) AND CORYNEBACTERIUM DIPHTHERIAE TOXOID ANTIGEN (FORMALDEHYDE INACTIVATED) 0.5 ML: 5; 2 INJECTION, SUSPENSION INTRAMUSCULAR at 11:49

## 2025-06-18 RX ADMIN — LIDOCAINE HYDROCHLORIDE 5 ML: 10 INJECTION, SOLUTION EPIDURAL; INFILTRATION; INTRACAUDAL; PERINEURAL at 11:48

## 2025-06-18 RX ADMIN — CEPHALEXIN 500 MG: 500 CAPSULE ORAL at 11:48

## 2025-06-18 RX ADMIN — BACITRACIN ZINC: 500 OINTMENT TOPICAL at 11:47

## 2025-06-18 ASSESSMENT — PAIN DESCRIPTION - LOCATION: LOCATION: FINGER (COMMENT WHICH ONE)

## 2025-06-18 ASSESSMENT — PAIN - FUNCTIONAL ASSESSMENT
PAIN_FUNCTIONAL_ASSESSMENT: ACTIVITIES ARE NOT PREVENTED
PAIN_FUNCTIONAL_ASSESSMENT: 0-10

## 2025-06-18 ASSESSMENT — PAIN DESCRIPTION - ONSET: ONSET: ON-GOING

## 2025-06-18 ASSESSMENT — PAIN DESCRIPTION - ORIENTATION: ORIENTATION: LEFT

## 2025-06-18 ASSESSMENT — PAIN DESCRIPTION - FREQUENCY: FREQUENCY: CONTINUOUS

## 2025-06-18 ASSESSMENT — PAIN SCALES - GENERAL: PAINLEVEL_OUTOF10: 5

## 2025-06-18 ASSESSMENT — LIFESTYLE VARIABLES
HOW MANY STANDARD DRINKS CONTAINING ALCOHOL DO YOU HAVE ON A TYPICAL DAY: 1 OR 2
HOW OFTEN DO YOU HAVE A DRINK CONTAINING ALCOHOL: MONTHLY OR LESS

## 2025-06-18 ASSESSMENT — PAIN DESCRIPTION - DESCRIPTORS: DESCRIPTORS: ACHING

## 2025-06-18 NOTE — ED PROVIDER NOTES
[facility] EMERGENCY DEPARTMENT  EMERGENCY DEPARTMENT ENCOUNTER      Pt Name: Sara Butler  MRN: [id number]  Birthdate 1960  Date of evaluation: 6/18/2025  Provider: ALDEN Thomason  Note Started: 6/18/25 11:47 AM EDT    CHIEF COMPLAINT       Chief Complaint   Patient presents with    Laceration     Pt accidentally cut her left ring finger while doing the dishes         HISTORY OF PRESENT ILLNESS   (Location/Symptom, Timing/Onset, Context/Setting, Quality, Duration, Modifying Factors, Severity)  Note limiting factors.   Sara Butler is a 65 y.o. female whom per chart review has a PMHx of anxiety, breast CA s/p mastectomy/reconstruction, neuropathy, depression, migraines, bipolar disorder, chronic back pain presents to ED for evaluation, closure of an L fourth digit laceration.  Patient reports that she was cleaning dishes and sustained a laceration from a  to her L fourth digit.  Patient arrives to the emergency department dressing in place, bleeding controlled.  Denies anticoagulation.  No additional complaints verbalized on arrival to ED.    Patient is not up-to-date on Tdap.    HPI    Nursing Notes were reviewed.    REVIEW OF SYSTEMS    (2-9 systems for level 4, 10 or more for level 5)     Review of Systems   Skin:  Positive for wound.   All other systems reviewed and are negative.      Except as noted above the remainder of the review of systems was reviewed and negative.       PAST MEDICAL HISTORY     Past Medical History:   Diagnosis Date    Anxiety     Cancer (HCC) 05/10/2017    DCIS left breast    Depression     Manic depression (HCC)     Migraines     Neuropathy     Thyroid disease          SURGICAL HISTORY       Past Surgical History:   Procedure Laterality Date    BREAST BIOPSY Left 5/23/2017    INJECTION METHYLENE BLUE LEFT BREAST ULTRASOUND GUIDED LEFT BREAST LUMPECTOMY LEFT SENTINEL NODE BIOPSY, 90 MINS, EVICEL 2ML, PAT ON ADMIT  performed by Catalina Page MD at [facility] – [place]

## 2025-06-18 NOTE — DISCHARGE INSTRUCTIONS
Take/apply medications as directed.     Follow-up with PCP in 10-days for wound reevaluation and suture removal.     Return to ED if any new, or worsening symptoms.

## 2025-06-30 ENCOUNTER — APPOINTMENT (OUTPATIENT)
Dept: PRIMARY CARE | Facility: CLINIC | Age: 65
End: 2025-06-30
Payer: MEDICARE

## 2025-06-30 VITALS
HEART RATE: 74 BPM | BODY MASS INDEX: 32.16 KG/M2 | TEMPERATURE: 97.3 F | DIASTOLIC BLOOD PRESSURE: 80 MMHG | RESPIRATION RATE: 16 BRPM | WEIGHT: 204.9 LBS | HEIGHT: 67 IN | OXYGEN SATURATION: 99 % | SYSTOLIC BLOOD PRESSURE: 139 MMHG

## 2025-06-30 DIAGNOSIS — Z48.02 VISIT FOR SUTURE REMOVAL: Primary | ICD-10-CM

## 2025-06-30 PROCEDURE — 3075F SYST BP GE 130 - 139MM HG: CPT

## 2025-06-30 PROCEDURE — 3079F DIAST BP 80-89 MM HG: CPT

## 2025-06-30 PROCEDURE — 99212 OFFICE O/P EST SF 10 MIN: CPT

## 2025-06-30 PROCEDURE — 3008F BODY MASS INDEX DOCD: CPT

## 2025-06-30 NOTE — PROGRESS NOTES
"Subjective   Patient ID: Marianna Crum is a 65 y.o. female who presents for Suture / Staple Removal (Ring finger on left hand ).    HPI   Suture removal  Patient here for suture removal.   Finished antibiotic this morning-Keflex   Has bacitracin ointment at home she has been using.  Patient started with 3 sutures, two have fell out on their own.    Review of Systems  Constitutional: Patient denies any fever, chills.   Skin: 1 suture on left hand ring finger.    Objective   /80   Pulse 74   Temp 36.3 °C (97.3 °F) (Temporal)   Resp 16   Ht 1.689 m (5' 6.5\")   Wt 92.9 kg (204 lb 14.4 oz)   SpO2 99%   BMI 32.58 kg/m²     Physical Exam  Constitutional: Awake, alert, and oriented. In no acute distress, no diaphoresis, well-developed, well-nourished. Appears stated age.   Skin: Suture intact, single.  Surrounding area dry, nonerythematous, no drainage, no crusting.  Suture removed.  Patient tolerated procedure.    Assessment/Plan   Diagnoses and all orders for this visit:  Visit for suture removal  - Suture removal in clinic today, patient finished antibiotic oral use today, patient can continue bacitracin given by the ER.    Follow Up  -Patient is to keep upcoming appointment with PCP for November with labs done prior.  -Patient is aware to reach back out to provider if symptoms do not improve after treatment is complete.   -Patient is aware to go to the emergency room if the patient would develop fever with worsening symptoms, chest pain, shortness of breath.   -The patient and/or caregiver has verbalized understanding of the above treatment plan and have no further questions at this time.     Marianna Crum- please be aware that any referrals discussed today in this visit should be placed as well as tests/labs ordered should result in prompt scheduling today. If not done today-then a phone call for scheduling is expected in a timely manner (within 2 weeks). If testing is to be done-a result should be " available to patient within 2 weeks time unless otherwise specified.  Please reach out if you have not heard results back within a timely manner.    You, the patient or caregiver, are responsible for making sure what was discussed in this visit is actually scheduled and completed. If suboptimal understanding of results of tests or referral reason- a follow up appointment with me or your primary provider should be made. If above recommended testing/referrals/labs/treatment ect does NOT occur-you are to connect with us for explanation. Patient understands that should they have testing outside  facilities that we may not receive the results and was told to call us if they have not heard from our office within a week after testing.     Please take into consideration, dragon voice recognition dictation software was utilized partially in the preparation of this note, therefore, inaccuracies in spelling, word choice and punctuation may have occurred which were not recognized at the time of signing.

## 2025-11-17 ENCOUNTER — APPOINTMENT (OUTPATIENT)
Dept: PRIMARY CARE | Facility: CLINIC | Age: 65
End: 2025-11-17
Payer: MEDICARE

## (undated) DEVICE — TIP APPL L35CM RIG FOR SEAL EVICEL

## (undated) DEVICE — GLOVE SURG 5.5 PF POLYISOPRENE WHT STRL SENSICARE MIC

## (undated) DEVICE — AGENT HEMOSTATIC SURGIFLOW MATRIX KIT W/THROMBIN

## (undated) DEVICE — MEDI-VAC YANKAUER SUCTION HANDLE W/BULBOUS TIP: Brand: CARDINAL HEALTH

## (undated) DEVICE — GOWN,AURORA,NONREINFORCED,LARGE: Brand: MEDLINE

## (undated) DEVICE — INTENDED FOR TISSUE SEPARATION, AND OTHER PROCEDURES THAT REQUIRE A SHARP SURGICAL BLADE TO PUNCTURE OR CUT.: Brand: BARD-PARKER ® CARBON RIB-BACK BLADES

## (undated) DEVICE — SKIN MARKER,REGULAR TIP WITH RULER: Brand: DEVON

## (undated) DEVICE — SYRINGE BLB 50CC IRRIG PLIABLE FNGR FLNG GRAD FLSK DISP

## (undated) DEVICE — TRAY PREP DRY W/ PREM GLV 2 APPL 6 SPNG 2 UNDPD 1 OVERWRAP

## (undated) DEVICE — SHEET,DRAPE,53X77,STERILE: Brand: MEDLINE

## (undated) DEVICE — MEDI-VAC NON-CONDUCTIVE SUCTION TUBING: Brand: CARDINAL HEALTH

## (undated) DEVICE — SUPER SPONGES,MEDIUM: Brand: KERLIX

## (undated) DEVICE — SUTURE VCRL + SZ 4-0 L18IN ABSRB UD L19MM PS-2 3/8 CIR PRIM VCP496H

## (undated) DEVICE — SUTURE PERMAHAND SZ 3-0 L30IN NONABSORBABLE BLK SH L26MM K832H

## (undated) DEVICE — ELECTRODE PT RET AD L9FT HI MOIST COND ADH HYDRGEL CORDED

## (undated) DEVICE — 1842 FOAM BLOCK NEEDLE COUNTER: Brand: DEVON

## (undated) DEVICE — PACK,LAPAROTOMY,NO GOWNS: Brand: MEDLINE

## (undated) DEVICE — PENCIL ES L3M BTTN SWCH HOLSTER W/ BLDE ELECTRD EDGE

## (undated) DEVICE — SUTURE VCRL + SZ 3-0 L27IN ABSRB UD L26MM SH 1/2 CIR VCP416H

## (undated) DEVICE — SUTURE VCRL SZ 3-0 L54IN ABSRB VLT LIGAPAK REEL NDL J205G

## (undated) DEVICE — SPONGE: LAP 4X18 W XR 200/CS: Brand: MEDICAL ACTION INDUSTRIES

## (undated) DEVICE — COVER,TABLE,44X90,STERILE: Brand: MEDLINE

## (undated) DEVICE — 2000CC GUARDIAN II: Brand: GUARDIAN

## (undated) DEVICE — SUTURE VCRL + SZ 3-0 L36IN ABSRB UD L36MM CT-1 1/2 CIR VCP944H

## (undated) DEVICE — SLEEVE CMPR SM STD CALF SCD ANEMB LF

## (undated) DEVICE — LABEL MED MINI W/ MARKER

## (undated) DEVICE — 3M™ STERI-STRIP™ REINFORCED ADHESIVE SKIN CLOSURES, R1547, 1/2 IN X 4 IN (12 MM X 100 MM), 6 STRIPS/ENVELOPE: Brand: 3M™ STERI-STRIP™